# Patient Record
Sex: MALE | Race: WHITE | NOT HISPANIC OR LATINO | Employment: OTHER | URBAN - METROPOLITAN AREA
[De-identification: names, ages, dates, MRNs, and addresses within clinical notes are randomized per-mention and may not be internally consistent; named-entity substitution may affect disease eponyms.]

---

## 2017-01-06 ENCOUNTER — OFFICE VISIT (OUTPATIENT)
Dept: UROLOGY | Facility: CLINIC | Age: 82
End: 2017-01-06
Payer: COMMERCIAL

## 2017-01-06 VITALS
SYSTOLIC BLOOD PRESSURE: 126 MMHG | WEIGHT: 196 LBS | BODY MASS INDEX: 25.15 KG/M2 | HEIGHT: 74 IN | DIASTOLIC BLOOD PRESSURE: 70 MMHG

## 2017-01-06 DIAGNOSIS — C61 PROSTATE CANCER (H): ICD-10-CM

## 2017-01-06 DIAGNOSIS — R10.2 PELVIC PAIN IN MALE: ICD-10-CM

## 2017-01-06 LAB
ALBUMIN UR-MCNC: NEGATIVE MG/DL
APPEARANCE UR: CLEAR
BILIRUB UR QL STRIP: NEGATIVE
COLOR UR AUTO: YELLOW
GLUCOSE UR STRIP-MCNC: NEGATIVE MG/DL
HGB UR QL STRIP: NEGATIVE
KETONES UR STRIP-MCNC: NEGATIVE MG/DL
LEUKOCYTE ESTERASE UR QL STRIP: NEGATIVE
NITRATE UR QL: NEGATIVE
PH UR STRIP: 6.5 PH (ref 5–7)
PSA SERPL-MCNC: 1.2 NG/ML (ref 0–4)
SP GR UR STRIP: 1.02 (ref 1–1.03)
URN SPEC COLLECT METH UR: NORMAL
UROBILINOGEN UR STRIP-ACNC: 0.2 EU/DL (ref 0.2–1)

## 2017-01-06 PROCEDURE — 36415 COLL VENOUS BLD VENIPUNCTURE: CPT | Performed by: UROLOGY

## 2017-01-06 PROCEDURE — 81003 URINALYSIS AUTO W/O SCOPE: CPT | Performed by: UROLOGY

## 2017-01-06 PROCEDURE — 99214 OFFICE O/P EST MOD 30 MIN: CPT | Performed by: UROLOGY

## 2017-01-06 PROCEDURE — 84153 ASSAY OF PSA TOTAL: CPT | Performed by: UROLOGY

## 2017-01-06 ASSESSMENT — PAIN SCALES - GENERAL: PAINLEVEL: MILD PAIN (2)

## 2017-01-06 NOTE — MR AVS SNAPSHOT
After Visit Summary   1/6/2017    Jorge Salomon    MRN: [id number]           Patient Information     Date Of Birth          3/31/1933        Visit Information        Provider Department      1/6/2017 11:10 AM Marek Miles MD [facility] [place]        Today's Diagnoses     Hypertrophy (benign) of prostate    -  1     Prostate cancer (H)         Pelvic pain in male           Care Instructions    Please call [phone] to schedule your Prostate MRI at the [facility].          Follow-ups after your visit        Your next 10 appointments already scheduled     Jan 12, 2017  3:20 PM   SHORT with Srinivasa Tello MD   [facility] ([facility])    [address]  [facility] [address] [phone]              Who to contact     If you have questions or need follow up information about today's clinic visit or your schedule please contact [facility] [place] directly at [phone].  Normal or non-critical lab and imaging results will be communicated to you by [employer], letter or phone within 4 business days after the clinic has received the results. If you do not hear from us within 7 days, please contact the clinic through [employer] or phone. If you have a critical or abnormal lab result, we will notify you by phone as soon as possible.  Submit refill requests through [employer] or call your pharmacy and they will forward the refill request to us. Please allow 3 business days for your refill to be completed.          Additional Information About Your Visit        MyChart Information     [employer] gives you secure access to your electronic health record. If you see a primary care provider, you can also send messages to your care team and make appointments. If you have questions, please call your primary care clinic.  If you do not have a primary care provider, please call [phone] and they will  "assist you.        Care EveryWhere ID     This is your Care EveryWhere ID. This could be used by other organizations to access your [place] medical records  [id number]        Your Vitals Were     Height BMI (Body Mass Index)                1.867 m (6' 1.5\") 25.51 kg/m2           Blood Pressure from Last 3 Encounters:   01/06/17 126/70   12/24/16 124/64   12/15/16 110/70    Weight from Last 3 Encounters:   01/06/17 88.905 kg (196 lb)   12/24/16 91.8 kg (202 lb 6.1 oz)   12/15/16 92.08 kg (203 lb)              We Performed the Following     PSA Diag Urologic Phys     UA without Microscopic          Today's Medication Changes          These changes are accurate as of: 1/6/17 11:50 AM.  If you have any questions, ask your nurse or doctor.               These medicines have changed or have updated prescriptions.        Dose/Directions    simvastatin 10 MG tablet   Commonly known as:  ZOCOR   This may have changed:  how much to take   Used for:  Hyperlipidemia LDL goal <100        Dose:  10 mg   Take 1 tablet (10 mg) by mouth At Bedtime   Quantity:  90 tablet   Refills:  1                Primary Care Provider Office Phone # Fax #    Srinivasa Tello [phone]       [facility] [address] [place] [address]        Thank you!     Thank you for choosing [facility] [place]  for your care. Our goal is always to provide you with excellent care. Hearing back from our patients is one way we can continue to improve our services. Please take a few minutes to complete the written survey that you may receive in the mail after your visit with us. Thank you!             Your Updated Medication List - Protect others around you: Learn how to safely use, store and throw away your medicines at www.disposemymeds.org.          This list is accurate as of: 1/6/17 11:50 AM.  Always use your most recent med list.                   Brand Name Dispense Instructions " for use    aspirin 81 MG tablet      1 TABLET DAILY       clotrimazole-betamethasone cream    LOTRISONE    30 g    Apply topically 2 times daily       dutasteride-tamsulosin HCl 0.5-0.4 MG Caps    NANCY    90 capsule    Take  One capsule by mouth every day       econazole nitrate 1 % cream     30 g    Apply to affected area twice daily for four weeks.       fish oil-omega-3 fatty acids 1000 MG capsule      Take 2 g by mouth daily.       GLUCOSAMINE PO      Take by mouth 2 times daily       hydrocortisone 1 % cream    CORTAID    30 g    Apply sparingly to affected area twice daily as needed.       Lycopene 10 MG Caps      Take 10 mg by mouth daily       order for DME     2 Units    Equipment being ordered: Knee high stockings, 20-30 mm Hg pressure. One pair, refill prn.       simvastatin 10 MG tablet    ZOCOR    90 tablet    Take 1 tablet (10 mg) by mouth At Bedtime       VITAMIN D PO      Take  by mouth.

## 2017-01-06 NOTE — Clinical Note
1/6/2017       RE: Jorge Salomon  [address] [facility] [address]     Dear Colleague,    Thank you for referring your patient, Jorge Salomon, to the [facility] [place] [facility]. Please see a copy of my visit note below.    Jorge Salomon is an 83-year-old gentleman with pelvic discomfort history of prostatitis but with a negative post-ejaculatory urine culture.  He was in the ER 2 weeks ago with a normal urinalysis and a CT scan of the abdomen and pelvis that showed no pelvic abnormalities and a 3.2 cm infrarenal aortic aneurysm. 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  Rebiopsies revealed no cancer.  The PSAs have been 1.0, 1.12 and 1.20. His rectal pain has improved with using Preparation H.  The patient's pain level is 2/10 today where as it was 5/10 in the emergency room.  He denies any dysuria or hematuria.  Past medical history:hypercholesterolemia, irritable bowel, colonic polyps, internal hemorrhoids, dry eyes, flushing, retinal issues, mitral valve prolapse,nonsmoker  Family history: Diabetes, COPD, heart disease, prostate cancer  Medications:baby aspirin, vitamin D, Lotrisone cream, Marie, E, zolpidem cream, fish oil, glucosamine, Cortaid cream, lycopene, simvastatin  Allergies: None  Exam: Normal appearance, normal vital signs, alert and oriented, normocephalic, normal respirations, neuro grossly intact.   Small left testis, normal right testis.  Scrotum and phallus normal.  Normal meatus, normal spermatic cords.  Groins without hernias or adenopathy.  No abdominal mass or pain on palpation.  Normal sphincter tone, no rectal mass or impaction, prostate is firm at the left apex and slightly asymmetric at the right apex.  Seminal vesicles are normal.   He is slightly tender at the left lobe of the  prostate.  Assessment: Anxiety-he admits to this and has never been treated                         Low-grade adenocarcinoma of the prostate                         Infrarenal aortic aneurysm                          Pelvic discomfort-he says he cannot live like this.  Could possibly be due to hemorrhoids, nonbacterial prostatitis  Plan: Repeat 3 T MRI of the prostate            PSA every 6 months            Regular ejaculations             See Dr. Tello next week.  Patient would probably benefit from an anxiolytic medication             Follow up with Fahad Brown M.D. For his aortic aneurysm-patient used to see Marlon Valle M.D.    Again, thank you for allowing me to participate in the care of your patient.      Sincerely,    Marek Miles MD

## 2017-01-06 NOTE — PROGRESS NOTES
Jorge Salomon is an 83-year-old gentleman with pelvic discomfort history of prostatitis but with a negative post-ejaculatory urine culture.  He was in the ER 2 weeks ago with a normal urinalysis and a CT scan of the abdomen and pelvis that showed no pelvic abnormalities and a 3.2 cm infrarenal aortic aneurysm. 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  Rebiopsies revealed no cancer.  The PSAs have been 1.0, 1.12 and 1.20. His rectal pain has improved with using Preparation H.  The patient's pain level is 2/10 today where as it was 5/10 in the emergency room.  He denies any dysuria or hematuria.  Past medical history:hypercholesterolemia, irritable bowel, colonic polyps, internal hemorrhoids, dry eyes, flushing, retinal issues, mitral valve prolapse,nonsmoker  Family history: Diabetes, COPD, heart disease, prostate cancer  Medications:baby aspirin, vitamin D, Lotrisone cream, Marie, E, zolpidem cream, fish oil, glucosamine, Cortaid cream, lycopene, simvastatin  Allergies: None  Exam: Normal appearance, normal vital signs, alert and oriented, normocephalic, normal respirations, neuro grossly intact.   Small left testis, normal right testis.  Scrotum and phallus normal.  Normal meatus, normal spermatic cords.  Groins without hernias or adenopathy.  No abdominal mass or pain on palpation.  Normal sphincter tone, no rectal mass or impaction, prostate is firm at the left apex and slightly asymmetric at the right apex.  Seminal vesicles are normal.   He is slightly tender at the left lobe of the prostate.  Assessment: Anxiety-he admits to this and has never been treated                         Low-grade adenocarcinoma of the prostate                         Infrarenal aortic aneurysm                          Pelvic discomfort-he says he cannot live like this.  Could possibly be due to hemorrhoids,  nonbacterial prostatitis  Plan: Repeat 3 T MRI of the prostate            PSA every 6 months            Regular ejaculations             See Dr. Tello next week.  Patient would probably benefit from an anxiolytic medication             Follow up with Fahad Brown M.D. For his aortic aneurysm-patient used to see Marlon Valle M.D.

## 2017-01-12 ENCOUNTER — OFFICE VISIT (OUTPATIENT)
Dept: INTERNAL MEDICINE | Facility: CLINIC | Age: 82
End: 2017-01-12
Payer: COMMERCIAL

## 2017-01-12 VITALS
BODY MASS INDEX: 25.93 KG/M2 | TEMPERATURE: 97.6 F | OXYGEN SATURATION: 96 % | RESPIRATION RATE: 12 BRPM | WEIGHT: 202 LBS | HEART RATE: 96 BPM | DIASTOLIC BLOOD PRESSURE: 64 MMHG | SYSTOLIC BLOOD PRESSURE: 126 MMHG | HEIGHT: 74 IN

## 2017-01-12 DIAGNOSIS — K64.9 HEMORRHOIDS, UNSPECIFIED HEMORRHOID TYPE: ICD-10-CM

## 2017-01-12 DIAGNOSIS — R10.2 PELVIC PAIN IN MALE: Primary | ICD-10-CM

## 2017-01-12 PROCEDURE — 99214 OFFICE O/P EST MOD 30 MIN: CPT | Performed by: INTERNAL MEDICINE

## 2017-01-12 NOTE — PROGRESS NOTES
"  SUBJECTIVE:                                                    Jorge Salomon is a 83 year old male who presents to clinic today for the following health issues:    1/12/17 --  Jorge reported that he was given Cipro on 12/15 to treat what was assumed to be prostatitis, but the pelvic/rectal pain was persistent and he reported to the ER on 12/24. He stated that he saw Dr. Miles of Urology on 1/06/17, who told him he did not have prostatitis. Dr Miles has scheduled a 3T MRI on 2/2/17. CT of the abdomen and pelvis obtained in the ED on 12/24 showed his AAA diameter at 3.2 cm, and his pancreas was normal.     He stated he still experiences some deep abdominal pains, though less severe.     He stated when symptoms started, he felt like his \"face was flushed\" and he felt sunburnt. He stated he has had hemorrhoids in the past. He stated he has been stressed by caring for his wife since her colostomy surgery on 10/26/16.     He stated that he can \"get very anxious about things\" and this can cause his \"guts to feel weird\".     ED/UC Followup:    Facility:  [facility] ED  Date of visit: 12/24/2016  Reason for visit: pelvic pain  Current Status: stable, improved discomfort with preporation H       ED Note 12/24/16 --   Jorge Salomon is a 83 year old male currently on Cipro for possible prostatitis despite negative UA and urine culture, who presents with deep pelvic pain. The patient reports that 2 weeks ago he developed rectal pain which he thought may be related to another bout of prostatitis as the pain felt similar to the past 3-4 bouts. He went to see his urologist at which point urinalysis and urine culture returned negative. The patient went back a few days later for a blood draw and CBC returned with a normal white blood cell count. Six days ago, the patient was placed on oral Cipro. Within the last few days, the patient's rectal pain has began radiating to the pelvic area. The pelvic pain is " "intermittent in nature and is very bothersome. He presents to the ED today with 5/10 pain. The patient notes that with prior bouts of prostatitis his symptoms usually improve within 24 hours of antibiotics, so he does not think his pain is related to that anymore. The patient denies appetite change, fever, nausea, vomiting, diarrhea, constipation, or dysuria.      Jorge Salomon is a 83 year old male with a history of prostatitis who presents for evaluation of deep pelvic discomfort in the context of possible prostatitis. He is on his 6th day of Cipro but negative UA and urine culture. Rectal exam is unremarkable for evidence of prostate or external hemorrhoid. CT was performed to rule out possible pelvic abscess, diverticulitis, or other process and is negative. He does note that he had a history of hemorrhoids in the past and preparation H helps this discomfort. I recommended he continue this and follow up with primary care in 2-3 days. I believe he is safe for discharge at this time with plan for primary care follow up in 2-3 days and strict return precautions for worse pain, fever, vomiting, or any other concerns.       Problem list and histories reviewed & adjusted, as indicated.  Additional history: as documented    Problem list, Medication list, Allergies, and Medical/Social/Surgical histories reviewed in [facility] and updated as appropriate.    ROS:  REVIEW OF SYSTEMS: The following systems have been completely reviewed and are negative except as noted above:   Constitutional, gastrointestinal, genitourinary, psychiatric systems.       OBJECTIVE:                                                    /64 mmHg  Pulse 96  Temp(Src) 97.6  F (36.4  C) (Oral)  Resp 12  Ht 1.867 m (6' 1.5\")  Wt 91.627 kg (202 lb)  BMI 26.29 kg/m2  SpO2 96%  Body mass index is 26.29 kg/(m^2).  GENERAL: healthy, alert and no distress  ABDOMEN: soft, nontender, no hepatosplenomegaly, no masses and bowel sounds normal  NEURO: " Normal strength and tone, mentation intact and speech normal  PSYCH: mentation appears normal, affect normal/bright    Diagnostic Test Results:  Results for orders placed or performed in visit on 01/06/17   UA without Microscopic   Result Value Ref Range    Color Urine Yellow     Appearance Urine Clear     Glucose Urine Negative NEG mg/dL    Bilirubin Urine Negative NEG    Ketones Urine Negative NEG mg/dL    Specific Gravity Urine 1.025 1.003 - 1.035    Blood Urine Negative NEG    pH Urine 6.5 5.0 - 7.0 pH    Protein Albumin Urine Negative NEG mg/dL    Urobilinogen Urine 0.2 0.2 - 1.0 EU/dL    Nitrite Urine Negative NEG    Leukocyte Esterase Urine Negative NEG    Source Midstream Urine    PSA Diag Urologic Phys   Result Value Ref Range    PSA Diag Urologic Phys 1.20 0.00 - 4.00 ng/mL        ASSESSMENT/PLAN:                                                      (R10.2) Pelvic pain in male  (primary encounter diagnosis)  Comment: improved, still recurrent. Improves subjectively with Preparation H. No obvious pathology on recent CARLOS or CT of abdomen/pelvis.   Concern is for GI origin, possibly rectal source. Offered Colorectal surgery consult.   Plan: COLORECTAL SURGERY REFERRAL        Follow up for referral, follow up with PCP in 6/12 months    (K64.9) Hemorrhoids, unspecified hemorrhoid type  Plan: COLORECTAL SURGERY REFERRAL      Patient Instructions   Things look fine now.     If the pains recur/persist, would next see the Colorectal Surgery specialists to see what ideas they might have.     See me back in 6-12 months.       This document serves as a record of the services and decisions personally performed and made by Srinivasa Tello MD. It was created on their behalf by Tyrell Fair, a trained medical scribe. The creation of this document is based the provider's statements to the medical scribe.  Tyrell Fair January 12, 2017 3:47 PM       Srinivasa Tello MD  [facility]

## 2017-01-12 NOTE — NURSING NOTE
"Chief Complaint   Patient presents with     ER F/U       Initial /64 mmHg  Pulse 96  Temp(Src) 97.6  F (36.4  C) (Oral)  Resp 12  Ht 6' 1.5\" (1.867 m)  Wt 202 lb (91.627 kg)  BMI 26.29 kg/m2  SpO2 96% Estimated body mass index is 26.29 kg/(m^2) as calculated from the following:    Height as of this encounter: 6' 1.5\" (1.867 m).    Weight as of this encounter: 202 lb (91.627 kg).  BP completed using cuff size: large  JBuffie CMA      "

## 2017-01-12 NOTE — PATIENT INSTRUCTIONS
Things look fine now.     If the pains recur/persist, would next see the Colorectal Surgery specialists to see what ideas they might have.     See me back in 6-12 months.

## 2017-01-12 NOTE — MR AVS SNAPSHOT
After Visit Summary   1/12/2017    Jorge Salomon    MRN: [id number]           Patient Information     Date Of Birth          3/31/1933        Visit Information        Provider Department      1/12/2017 3:20 PM Srinivasa Tello MD [facility]        Today's Diagnoses     Pelvic pain in male    -  1     Hemorrhoids, unspecified hemorrhoid type           Care Instructions    Things look fine now.     If the pains recur/persist, would next see the Colorectal Surgery specialists to see what ideas they might have.     See me back in 6-12 months.         Follow-ups after your visit        Additional Services     COLORECTAL SURGERY REFERRAL       Your provider has referred you to: N: [facility] [place] [phone]   [email or web address]  [place] [phone]   [email or web address]    Referral Reason(s): Hemorrhoids and pelvic pain  Special Concerns: None  This referral is: Elective (week +)  It is OK to leave a message on patient's voicemail.    Please be aware that coverage of these services is subject to the terms and limitations of your health insurance plan.  Call member services at your health plan with any benefit or coverage questions.      Please bring the following with you to your appointment:    (1) Any X-Rays, CTs or MRIs which have been performed.  Contact the facility where they were done to arrange for  prior to your scheduled appointment.    (2) List of current medications  (3) This referral request   (4) Any documents/labs given to you for this referral                  Your next 10 appointments already scheduled     Feb 02, 2017 11:30 AM   (Arrive by 11:15 AM)   MR PROSTATE with [id number]   [facility] ([facility])    [address] [address] [phone]           Take your medicines as usual, unless your doctor tells you not to. Bring a list  of your current medicines to your exam (including vitamins, minerals and over-the-counter drugs). Also bring the results of similar scans you may have had.  Please remove any body piercings and hair extensions before you arrive.  Follow your doctor s orders. If you do not, we may have to postpone your exam.  You will not have contrast for this exam. You do not need to do anything special to prepare.  The MRI machine uses a strong magnet. Please wear clothes without metal (snaps, zippers). A sweatsuit works well, or we may give you a hospital gown.   **IMPORTANT** THE INSTRUCTIONS BELOW ARE ONLY FOR THOSE PATIENTS WHO HAVE BEEN TOLD THEY WILL RECEIVE SEDATION OR GENERAL ANESTHESIA DURING THEIR MRI PROCEDURE:  IF YOU WILL RECEIVE SEDATION (take medicine to help you relax during your exam):   You must get the medicine from your doctor before you arrive. Bring the medicine to the exam. Do not take it at home.   Arrive one hour early. Bring someone who can take you home after the test. Your medicine will make you sleepy. After the exam, you may not drive, take a bus or take a taxi by yourself.   No eating 8 hours before your exam. You may have clear liquids up until 4 hours before your exam. (Clear liquids include water, clear tea, black coffee and fruit juice without pulp.)  IF YOU WILL RECEIVE ANESTHESIA (be asleep for your exam):   Arrive 1 1/2 hours early. Bring someone who can take you home after the test. You may not drive, take a bus or take a taxi by yourself.   No eating 8 hours before your exam. You may have clear liquids up until 4 hours before your exam. (Clear liquids include water, clear tea, black coffee and fruit juice without pulp.)   You will spend four to five hours in the recovery room.  Please call the Imaging Department at your exam site with any questions.            Jul 11, 2017  9:00 AM   Return Visit with Marek Miles MD   [facility] [place] ([facility]rolog[facility]  "[facility])    [address] [facility]  [address]  [facility] [address] [phone]              Who to contact     If you have questions or need follow up information about today's clinic visit or your schedule please contact [facility] directly at [phone].  Normal or non-critical lab and imaging results will be communicated to you by MyChart, letter or phone within 4 business days after the clinic has received the results. If you do not hear from us within 7 days, please contact the clinic through [employer] or phone. If you have a critical or abnormal lab result, we will notify you by phone as soon as possible.  Submit refill requests through [email or web address] or call your pharmacy and they will forward the refill request to us. Please allow 3 business days for your refill to be completed.          Additional Information About Your Visit        MyChart Information     [email or web address] gives you secure access to your electronic health record. If you see a primary care provider, you can also send messages to your care team and make appointments. If you have questions, please call your primary care clinic.  If you do not have a primary care provider, please call [phone] and they will assist you.        Care EveryWhere ID     This is your Care EveryWhere ID. This could be used by other organizations to access your [place] medical records  [id number]        Your Vitals Were     Pulse Temperature Respirations Height BMI (Body Mass Index) Pulse Oximetry    96 97.6  F (36.4  C) (Oral) 12 6' 1.5\" (1.867 m) 26.29 kg/m2 96%       Blood Pressure from Last 3 Encounters:   01/12/17 126/64   01/06/17 126/70   12/24/16 124/64    Weight from Last 3 Encounters:   01/12/17 202 lb (91.627 kg)   01/06/17 196 lb (88.905 kg)   12/24/16 202 lb 6.1 oz (91.8 kg)              We Performed the Following     COLORECTAL SURGERY REFERRAL        Primary Care Provider Office Phone # Fax #    Srinivasa Tello [phone] " [phone]       [facility] [address]  [facility] [address]        Thank you!     Thank you for choosing [facility]  for your care. Our goal is always to provide you with excellent care. Hearing back from our patients is one way we can continue to improve our services. Please take a few minutes to complete the written survey that you may receive in the mail after your visit with us. Thank you!             Your Updated Medication List - Protect others around you: Learn how to safely use, store and throw away your medicines at www.disposemymeds.org.          This list is accurate as of: 1/12/17  3:57 PM.  Always use your most recent med list.                   Brand Name Dispense Instructions for use    aspirin 81 MG tablet      1 TABLET DAILY       clotrimazole-betamethasone cream    LOTRISONE    30 g    Apply topically 2 times daily       dutasteride-tamsulosin HCl 0.5-0.4 MG Caps    NANCY    90 capsule    Take  One capsule by mouth every day       fish oil-omega-3 fatty acids 1000 MG capsule      Take 2 g by mouth daily.       GLUCOSAMINE PO      Take by mouth 2 times daily       hydrocortisone 1 % cream    CORTAID    30 g    Apply sparingly to affected area twice daily as needed.       Lycopene 10 MG Caps      Take 10 mg by mouth daily       order for DME     2 Units    Equipment being ordered: Knee high stockings, 20-30 mm Hg pressure. One pair, refill prn.       VITAMIN D PO      Take  by mouth.

## 2017-02-15 ENCOUNTER — OFFICE VISIT (OUTPATIENT)
Dept: UROLOGY | Facility: CLINIC | Age: 82
End: 2017-02-15
Payer: COMMERCIAL

## 2017-02-15 VITALS
BODY MASS INDEX: 26.77 KG/M2 | WEIGHT: 202 LBS | DIASTOLIC BLOOD PRESSURE: 68 MMHG | SYSTOLIC BLOOD PRESSURE: 120 MMHG | HEIGHT: 73 IN | HEART RATE: 88 BPM

## 2017-02-15 DIAGNOSIS — C61 MALIGNANT NEOPLASM OF PROSTATE (H): Primary | ICD-10-CM

## 2017-02-15 PROCEDURE — 99213 OFFICE O/P EST LOW 20 MIN: CPT | Performed by: UROLOGY

## 2017-02-15 ASSESSMENT — PAIN SCALES - GENERAL: PAINLEVEL: NO PAIN (0)

## 2017-02-15 NOTE — LETTER
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 Repeat biopsies were negative. His PSA has been stable at 1.0. However, recent rectal exam revealed some induration at the left apex. 3 [facility] MRI of the prostate shows some growth of tumor in this area with the suspicion of extracapsular spread. There is no pelvic adenopathy.  Past medical history: Colon polyps, dry eyes, abdominal hernia, hemorrhoids, irritable bowel syndrome, retinal pathology, mitral valve prolapse, mumps, hypercholesterolemia, nonsmoker  Family history: Prostate cancer, diabetes, heart disease  Medications: Baby aspirin, vitamin D, panda, fish oil, glucosamine, hydrocortisone cream, lycopene  Allergies: None  Review of systems: No difficulty voiding, dysuria or hematuria  Exam: Normal appearance, normal vital signs, alert and oriented, normocephalic, normal respirations, neuro grossly intact. 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 Discussed options for treatment including continued observation and intermittent hormonal therapy-patient does not want side effects of ADT.  Patient understands a grade 3 adenocarcinoma the prostate is unlikely to ever metastasize. Recommend he consider IMR T for cure  Plan: Discuss with Annetta Hunt D.O.            Patient would not require hormone therapy    Marek Miles MD

## 2017-02-15 NOTE — PROGRESS NOTES
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 Repeat biopsies were negative. His PSA has been stable at 1.0. However, recent rectal exam revealed some induration at the left apex. 3 [facility] MRI of the prostate shows some growth of tumor in this area with the suspicion of extracapsular spread. There is no pelvic adenopathy.  Past medical history: Colon polyps, dry eyes, abdominal hernia, hemorrhoids, irritable bowel syndrome, retinal pathology, mitral valve prolapse, mumps, hypercholesterolemia, nonsmoker  Family history: Prostate cancer, diabetes, heart disease  Medications: Baby aspirin, vitamin D, panda, fish oil, glucosamine, hydrocortisone cream, lycopene  Allergies: None  Review of systems: No difficulty voiding, dysuria or hematuria  Exam: Normal appearance, normal vital signs, alert and oriented, normocephalic, normal respirations, neuro grossly intact. 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 Discussed options for treatment including continued observation and intermittent hormonal therapy-patient does not want side effects of ADT.  Patient understands a grade 3 adenocarcinoma the prostate is unlikely to ever metastasize. Recommend he consider IMR T for cure  Plan: Discuss with Annetta Hunt D.O.            Patient would not require hormone therapy

## 2017-02-15 NOTE — MR AVS SNAPSHOT
After Visit Summary   2/15/2017    Jorge Salomon    MRN: [id number]           Patient Information     Date Of Birth          3/31/1933        Visit Information        Provider Department      2/15/2017 2:00 PM Marek Miles MD [facility] [place]        Today's Diagnoses     Malignant neoplasm of prostate (H)    -  1       Follow-ups after your visit        Your next 10 appointments already scheduled     Jul 11, 2017  9:00 AM CDT   Return Visit with Marek Miles MD   [facility] [place] ([facility])    [address]  [address]  [facility] [address] [phone]              Who to contact     If you have questions or need follow up information about today's clinic visit or your schedule please contact [facility] directly at [phone].  Normal or non-critical lab and imaging results will be communicated to you by MyChart, letter or phone within 4 business days after the clinic has received the results. If you do not hear from us within 7 days, please contact the clinic through Zura!hart or phone. If you have a critical or abnormal lab result, we will notify you by phone as soon as possible.  Submit refill requests through Roam & Wander or call your pharmacy and they will forward the refill request to us. Please allow 3 business days for your refill to be completed.          Additional Information About Your Visit        MyChart Information     [employer] gives you secure access to your electronic health record. If you see a primary care provider, you can also send messages to your care team and make appointments. If you have questions, please call your primary care clinic.  If you do not have a primary care provider, please call [phone] and they will assist you.        Care EveryWhere ID     This is your Care EveryWhere ID. This could be used by other  "organizations to access your [facility] medical records  [id number]        Your Vitals Were     Pulse Height BMI (Body Mass Index)             88 1.854 m (6' 1\") 26.65 kg/m2          Blood Pressure from Last 3 Encounters:   02/15/17 120/68   01/12/17 126/64   01/06/17 126/70    Weight from Last 3 Encounters:   02/15/17 91.6 kg (202 lb)   01/12/17 91.6 kg (202 lb)   01/06/17 88.9 kg (196 lb)              Today, you had the following     No orders found for display       Primary Care Provider Office Phone # Fax #    Srinivasa Tello [phone]       [facility] [address] [place] [address]        Thank you!     Thank you for choosing [facility]  for your care. Our goal is always to provide you with excellent care. Hearing back from our patients is one way we can continue to improve our services. Please take a few minutes to complete the written survey that you may receive in the mail after your visit with us. Thank you!             Your Updated Medication List - Protect others around you: Learn how to safely use, store and throw away your medicines at www.disposemymeds.org.          This list is accurate as of: 2/15/17  2:20 PM.  Always use your most recent med list.                   Brand Name Dispense Instructions for use    aspirin 81 MG tablet      1 TABLET DAILY       dutasteride-tamsulosin HCl 0.5-0.4 MG Caps    NANCY    90 capsule    Take  One capsule by mouth every day       fish oil-omega-3 fatty acids 1000 MG capsule      Take 2 g by mouth daily.       GLUCOSAMINE PO      Take by mouth 2 times daily       hydrocortisone 1 % cream    CORTAID    30 g    Apply sparingly to affected area twice daily as needed.       Lycopene 10 MG Caps      Take 10 mg by mouth daily       VITAMIN D PO      Take  by mouth.         "

## 2017-02-15 NOTE — LETTER
2/15/2017       RE: Jorge Salomon  [address] [facility] [address]     Dear Colleague,    Thank you for referring your patient, Jorge Salomon, to the [facility]OLOG[facility] CLINIC [place] at [facility]. Please see a copy of my visit note below.    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 Repeat biopsies were negative. His PSA has been stable at 1.0. However, recent rectal exam revealed some induration at the left apex. 3 [facility] MRI of the prostate shows some growth of tumor in this area with the suspicion of extracapsular spread. There is no pelvic adenopathy.  Past medical history: Colon polyps, dry eyes, abdominal hernia, hemorrhoids, irritable bowel syndrome, retinal pathology, mitral valve prolapse, mumps, hypercholesterolemia, nonsmoker  Family history: Prostate cancer, diabetes, heart disease  Medications: Baby aspirin, vitamin D, panda, fish oil, glucosamine, hydrocortisone cream, lycopene  Allergies: None  Review of systems: No difficulty voiding, dysuria or hematuria  Exam: Normal appearance, normal vital signs, alert and oriented, normocephalic, normal respirations, neuro grossly intact. 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 Discussed options for treatment including continued observation and intermittent hormonal therapy-patient does not want side effects of ADT.  Patient understands a grade 3 adenocarcinoma the prostate is unlikely to ever metastasize. Recommend he consider IMR T for cure  Plan: Discuss with Annetta Hunt D.O.            Patient would not require hormone therapy    Again, thank you for allowing me to participate in the care of your patient.    Sincerely,    Marek Miles MD

## 2017-02-20 ENCOUNTER — TELEPHONE (OUTPATIENT)
Dept: UROLOGY | Facility: CLINIC | Age: 82
End: 2017-02-20

## 2017-02-20 NOTE — TELEPHONE ENCOUNTER
Pt would like to know how much risk he will be in if he postpones starting his radiation until October. He would like you to call him.

## 2017-03-02 ENCOUNTER — TRANSFERRED RECORDS (OUTPATIENT)
Dept: HEALTH INFORMATION MANAGEMENT | Facility: CLINIC | Age: 82
End: 2017-03-02

## 2017-04-05 ENCOUNTER — TELEPHONE (OUTPATIENT)
Dept: UROLOGY | Facility: CLINIC | Age: 82
End: 2017-04-05

## 2017-04-05 NOTE — TELEPHONE ENCOUNTER
Pt has finished radiation #15 and is now having trouble urinating. Pt is taking double the amount of Panda and it has worked very well. However, in doing this he is also doubling the amount of avodart as well. Do you want him an Rx for just one daily flomax that he can take along with his panda? How do you wish to proceed ...

## 2017-05-31 ENCOUNTER — TELEPHONE (OUTPATIENT)
Dept: UROLOGY | Facility: CLINIC | Age: 82
End: 2017-05-31

## 2017-05-31 DIAGNOSIS — N40.0 BPH (BENIGN PROSTATIC HYPERPLASIA): ICD-10-CM

## 2017-05-31 NOTE — TELEPHONE ENCOUNTER
Received a phone call from [facility] requesting a RX refill on patient's generic Marie. However, patient is requesting a change in medication to twice daily. MD is currently out of office today and LM for patient. Will wait for a return phone call and to okay with MD before authorizing. Carol Aiken LPN

## 2017-06-01 RX ORDER — DUTASTERIDE AND TAMSULOSIN HYDROCHLORIDE CAPSULES .5; .4 MG/1; MG/1
CAPSULE ORAL
Qty: 30 CAPSULE | Refills: 0 | Status: SHIPPED | OUTPATIENT
Start: 2017-06-01 | End: 2017-06-06

## 2017-06-06 ENCOUNTER — TELEPHONE (OUTPATIENT)
Dept: UROLOGY | Facility: CLINIC | Age: 82
End: 2017-06-06

## 2017-06-06 RX ORDER — DUTASTERIDE AND TAMSULOSIN HYDROCHLORIDE CAPSULES .5; .4 MG/1; MG/1
CAPSULE ORAL
Qty: 60 CAPSULE | Refills: 3 | Status: SHIPPED | OUTPATIENT
Start: 2017-06-06 | End: 2018-07-27 | Stop reason: ALTCHOICE

## 2017-06-06 NOTE — TELEPHONE ENCOUNTER
Received a reply from MD that patient's Marie was okayed to take BID. Sent RX to patient's preferred pharmacy and patient will follow-up in 2 months post radiation as planned. Carol Aiken LPN

## 2017-08-01 DIAGNOSIS — C61 MALIGNANT NEOPLASM OF PROSTATE (H): ICD-10-CM

## 2017-08-01 PROCEDURE — G0103 PSA SCREENING: HCPCS | Mod: GA | Performed by: INTERNAL MEDICINE

## 2017-08-02 LAB — PSA SERPL-ACNC: 0.66 UG/L (ref 0–4)

## 2017-08-08 ENCOUNTER — OFFICE VISIT (OUTPATIENT)
Dept: UROLOGY | Facility: CLINIC | Age: 82
End: 2017-08-08
Payer: COMMERCIAL

## 2017-08-08 VITALS — WEIGHT: 196 LBS | OXYGEN SATURATION: 98 % | HEART RATE: 76 BPM | BODY MASS INDEX: 26.55 KG/M2 | HEIGHT: 72 IN

## 2017-08-08 DIAGNOSIS — C61 MALIGNANT NEOPLASM OF PROSTATE (H): Primary | ICD-10-CM

## 2017-08-08 PROCEDURE — 99212 OFFICE O/P EST SF 10 MIN: CPT | Performed by: UROLOGY

## 2017-08-08 ASSESSMENT — PAIN SCALES - GENERAL: PAINLEVEL: NO PAIN (0)

## 2017-08-08 NOTE — LETTER
8/8/2017       RE: Jorge Salomon  [address] [facility] [address]     Dear Colleague,    Thank you for referring your patient, Jorge Salomon, to the [facility] CLINIC [place] at [facility]. Please see a copy of my visit note below.    Jorge Salomon is an 84-year-old gentleman with adenocarcinoma the prostate. He completed external radiation earlier this year for a grade 3, T2a cancer at the left apex. His PSA is now down to 0.66  Other past medical history: Colonic polyps, abdominal hernia, hemorrhoids, dry eyes, IBS, retinal pathology, mitral valve prolapse, high cholesterol, nonsmoker  Medications: Low-dose aspirin, vitamin D, Marie, glucosamine, fish oil, hydrocortisone cream, lycopene  Allergies: None  Review of systems: Voiding well, no dysuria or hematuria, no bowel issues currently  Exam: Normal appearance, normal vital signs, alert and oriented, normocephalic, normal respirations, neuro grossly intact. Normal sphincter tone, no rectal mass or impaction, benign feeling prostate, normal seminal vesicles  Assessment: Grade 3 adenocarcinoma the prostate, stage TIIa-patient has had good response to radiation therapy  Plan: See me again in 6 months with PSA    Again, thank you for allowing me to participate in the care of your patient.      Sincerely,  Marek Miles MD

## 2017-08-08 NOTE — NURSING NOTE
My note says this appt is FU for MRI.  Pt states that is incorrect.  Pt states he just finished radiation and is here to FU on that.  Pt denies any voiding trouble.  ROSIE Joyce, CMA

## 2017-08-08 NOTE — MR AVS SNAPSHOT
After Visit Summary   8/8/2017    Jorge Salomon    MRN: [id number]           Patient Information     Date Of Birth          3/31/1933        Visit Information        Provider Department      8/8/2017 10:20 AM Marek Miles MD [facility] [place]        Today's Diagnoses     Malignant neoplasm of prostate (H)    -  1       Follow-ups after your visit        Follow-up notes from your care team     Return in about 6 months (around 2/8/2018) for PSA.      Future tests that were ordered for you today     Open Future Orders        Priority Expected Expires Ordered    PSA tumor marker [[id number]] Routine 2/8/2018 8/8/2018 8/8/2017            Who to contact     If you have questions or need follow up information about today's clinic visit or your schedule please contact [facility] UR[facility] directly at [phone].  Normal or non-critical lab and imaging results will be communicated to you by MyChart, letter or phone within 4 business days after the clinic has received the results. If you do not hear from us within 7 days, please contact the clinic through SnipSnaphart or phone. If you have a critical or abnormal lab result, we will notify you by phone as soon as possible.  Submit refill requests through Generate or call your pharmacy and they will forward the refill request to us. Please allow 3 business days for your refill to be completed.          Additional Information About Your Visit        MyChart Information     Generate gives you secure access to your electronic health record. If you see a primary care provider, you can also send messages to your care team and make appointments. If you have questions, please call your primary care clinic.  If you do not have a primary care provider, please call [phone] and they will assist you.        Care EveryWhere ID     This is your Care EveryWhere ID. This could be used by other  organizations to access your [facility] medical records  [id number]        Your Vitals Were     Pulse Height Pulse Oximetry BMI (Body Mass Index)          76 1.829 m (6') 98% 26.58 kg/m2         Blood Pressure from Last 3 Encounters:   02/15/17 120/68   01/12/17 126/64   01/06/17 126/70    Weight from Last 3 Encounters:   08/08/17 88.9 kg (196 lb)   02/15/17 91.6 kg (202 lb)   01/12/17 91.6 kg (202 lb)               Primary Care Provider Office Phone # Fax #    Srinivasa Tello [phone]       [facility] [address]  [facility] [address]        Equal Access to Services     TRIPP BUTLER : Hadii aad ku hadasho Soomaali, waaxda luqadaha, qaybta kaalmada adeegyada, waxsaleem idiin haykeyla marc. So [facility] [phone].    ATENCIÓN: Si habla español, tiene a ojeda disposición servicios gratuitos de asistencia lingüística. Llame al [phone].    We comply with applicable federal civil rights laws and [place] laws. We do not discriminate on the basis of race, color, national origin, age, disability sex, sexual orientation or gender identity.            Thank you!     Thank you for choosing [facility] CLINIC [place]  for your care. Our goal is always to provide you with excellent care. Hearing back from our patients is one way we can continue to improve our services. Please take a few minutes to complete the written survey that you may receive in the mail after your visit with us. Thank you!             Your Updated Medication List - Protect others around you: Learn how to safely use, store and throw away your medicines at www.disposemymeds.org.          This list is accurate as of: 8/8/17 10:53 AM.  Always use your most recent med list.                   Brand Name Dispense Instructions for use Diagnosis    aspirin 81 MG tablet      1 TABLET DAILY        dutasteride-tamsulosin HCl 0.5-0.4 MG Caps    NANCY    60 capsule    Take  One capsule  by mouth every day    BPH (benign prostatic hyperplasia)       fish oil-omega-3 fatty acids 1000 MG capsule      Take 2 g by mouth daily.        GLUCOSAMINE PO      Take by mouth 2 times daily        hydrocortisone 1 % cream    CORTAID    30 g    Apply sparingly to affected area twice daily as needed.    Tinea cruris       Lycopene 10 MG Caps      Take 10 mg by mouth daily        VITAMIN D PO      Take  by mouth.

## 2018-01-30 DIAGNOSIS — C61 MALIGNANT NEOPLASM OF PROSTATE (H): ICD-10-CM

## 2018-01-30 PROCEDURE — 84153 ASSAY OF PSA TOTAL: CPT | Performed by: UROLOGY

## 2018-01-30 PROCEDURE — 36415 COLL VENOUS BLD VENIPUNCTURE: CPT | Performed by: UROLOGY

## 2018-01-31 LAB — PSA SERPL-MCNC: 0.17 UG/L (ref 0–4)

## 2018-02-06 ENCOUNTER — OFFICE VISIT (OUTPATIENT)
Dept: UROLOGY | Facility: CLINIC | Age: 83
End: 2018-02-06
Payer: COMMERCIAL

## 2018-02-06 VITALS
DIASTOLIC BLOOD PRESSURE: 60 MMHG | SYSTOLIC BLOOD PRESSURE: 128 MMHG | BODY MASS INDEX: 25.67 KG/M2 | WEIGHT: 200 LBS | HEIGHT: 74 IN | HEART RATE: 72 BPM

## 2018-02-06 DIAGNOSIS — C61 MALIGNANT NEOPLASM OF PROSTATE (H): Primary | ICD-10-CM

## 2018-02-06 PROCEDURE — 99213 OFFICE O/P EST LOW 20 MIN: CPT | Performed by: UROLOGY

## 2018-02-06 ASSESSMENT — PAIN SCALES - GENERAL: PAINLEVEL: NO PAIN (0)

## 2018-02-06 NOTE — PROGRESS NOTES
Jorge Salomon is an 84-year-old gentleman with grade 3+3 adenocarcinoma the prostate at the left apex. He had stage TIIa disease. PSA is down to 0.17 after radiation therapy  He is having no difficulty voiding and will discontinue Flomax in the near future but stay on Avodart. Other medications include low-dose aspirin, vitamin D, glucosamine, fish oil, lycopene  Allergies: None  Review of systems: No dysuria or hematuria  Exam: Normal appearance, normal vital signs, alert and oriented, normocephalic, normal respirations, neuro grossly intact. Normal sphincter tone, no rectal mass or impaction, benign feeling prostate, normal seminal vesicles  Assessment: Adenocarcinoma the prostate  Plan: See me in 6 months with PSA and for digital rectal exam

## 2018-02-06 NOTE — LETTER
2/6/2018       RE: Jorge Salomon  [address]  [facility] [address]     Dear Colleague,    Thank you for referring your patient, Jorge Salomon, to the [facility] CLINIC [place] at [facility]. Please see a copy of my visit note below.    Jorge Salomon is an 84-year-old gentleman with grade 3+3 adenocarcinoma the prostate at the left apex. He had stage TIIa disease. PSA is down to 0.17 after radiation therapy  He is having no difficulty voiding and will discontinue Flomax in the near future but stay on Avodart. Other medications include low-dose aspirin, vitamin D, glucosamine, fish oil, lycopene  Allergies: None  Review of systems: No dysuria or hematuria  Exam: Normal appearance, normal vital signs, alert and oriented, normocephalic, normal respirations, neuro grossly intact. Normal sphincter tone, no rectal mass or impaction, benign feeling prostate, normal seminal vesicles  Assessment: Adenocarcinoma the prostate  Plan: See me in 6 months with PSA and for digital rectal exam    Again, thank you for allowing me to participate in the care of your patient.      Sincerely,    Marek Miles MD

## 2018-02-06 NOTE — MR AVS SNAPSHOT
After Visit Summary   2/6/2018    Jorge Salomon    MRN: [id number]           Patient Information     Date Of Birth          3/31/1933        Visit Information        Provider Department      2/6/2018 10:50 AM Marek Miles MD [facility]        Today's Diagnoses     Malignant neoplasm of prostate (H)    -  1       Follow-ups after your visit        Follow-up notes from your care team     Return in about 6 months (around 8/6/2018) for PSA.      Your next 10 appointments already scheduled     Aug 07, 2018 10:30 AM CDT   Return Visit with Marek Miles MD   [facility] ([facility])    [address]  [address]  [facility] [address] [phone]              Future tests that were ordered for you today     Open Future Orders        Priority Expected Expires Ordered    PSA tumor marker [[id number]] Routine 8/6/2018 2/6/2019 2/6/2018            Who to contact     If you have questions or need follow up information about today's clinic visit or your schedule please contact [facility] directly at [phone].  Normal or non-critical lab and imaging results will be communicated to you by MyChart, letter or phone within 4 business days after the clinic has received the results. If you do not hear from us within 7 days, please contact the clinic through [employer] H2hart or phone. If you have a critical or abnormal lab result, we will notify you by phone as soon as possible.  Submit refill requests through NuPathe or call your pharmacy and they will forward the refill request to us. Please allow 3 business days for your refill to be completed.          Additional Information About Your Visit        MyChart Information     NuPathe gives you secure access to your electronic health record. If you see a primary care provider, you can also send messages  "to your care team and make appointments. If you have questions, please call your primary care clinic.  If you do not have a primary care provider, please call [phone] and they will assist you.        Care EveryWhere ID     This is your Care EveryWhere ID. This could be used by other organizations to access your [facility] medical records  [id number]        Your Vitals Were     Pulse Height BMI (Body Mass Index)             72 1.867 m (6' 1.5\") 26.03 kg/m2          Blood Pressure from Last 3 Encounters:   02/06/18 128/60   02/15/17 120/68   01/12/17 126/64    Weight from Last 3 Encounters:   02/06/18 90.7 kg (200 lb)   08/08/17 88.9 kg (196 lb)   02/15/17 91.6 kg (202 lb)               Primary Care Provider Office Phone # Fax #    Srinivasa Tello [phone]       [address] [facility] [address]  [place] [address]        Equal Access to Services     [facility]: Hadii aad ku hadasho Soomaali, waaxda luqadaha, qaybta kaalmada adeegyada, waxay idiin hayaan mecca kharash leonel . So [facility] [phone].    ATENCIÓN: Si habla español, tiene a ojeda disposición servicios gratuitos de asistencia lingüística. Llame al [phone].    We comply with applicable federal civil rights laws and [place] laws. We do not discriminate on the basis of race, color, national origin, age, disability, sex, sexual orientation, or gender identity.            Thank you!     Thank you for choosing [facility]OLOGY CLINIC [place]  for your care. Our goal is always to provide you with excellent care. Hearing back from our patients is one way we can continue to improve our services. Please take a few minutes to complete the written survey that you may receive in the mail after your visit with us. Thank you!             Your Updated Medication List - Protect others around you: Learn how to safely use, store and throw away your medicines at www.disposemymeds.org.          This list is accurate as of 2/6/18 " 11:28 AM.  Always use your most recent med list.                   Brand Name Dispense Instructions for use Diagnosis    aspirin 81 MG tablet      1 TABLET DAILY        dutasteride-tamsulosin HCl 0.5-0.4 MG Caps    NANCY    60 capsule    Take  One capsule by mouth every day    BPH (benign prostatic hyperplasia)       fish oil-omega-3 fatty acids 1000 MG capsule      Take 2 g by mouth daily.        GLUCOSAMINE PO      Take by mouth 2 times daily        hydrocortisone 1 % cream    CORTAID    30 g    Apply sparingly to affected area twice daily as needed.    Tinea cruris       Lycopene 10 MG Caps      Take 10 mg by mouth daily        VITAMIN D PO      Take  by mouth.

## 2018-03-22 ENCOUNTER — OFFICE VISIT (OUTPATIENT)
Dept: INTERNAL MEDICINE | Facility: CLINIC | Age: 83
End: 2018-03-22
Payer: COMMERCIAL

## 2018-03-22 VITALS
TEMPERATURE: 98.3 F | HEIGHT: 74 IN | HEART RATE: 82 BPM | DIASTOLIC BLOOD PRESSURE: 68 MMHG | WEIGHT: 205.8 LBS | SYSTOLIC BLOOD PRESSURE: 110 MMHG | OXYGEN SATURATION: 95 % | BODY MASS INDEX: 26.41 KG/M2

## 2018-03-22 DIAGNOSIS — C61 PROSTATE CANCER (H): Primary | ICD-10-CM

## 2018-03-22 DIAGNOSIS — L72.3 SEBACEOUS CYST: ICD-10-CM

## 2018-03-22 DIAGNOSIS — M25.50 PAIN IN JOINT, MULTIPLE SITES: ICD-10-CM

## 2018-03-22 LAB
CRP SERPL-MCNC: 17 MG/L (ref 0–8)
DIFFERENTIAL METHOD BLD: ABNORMAL
EOSINOPHIL # BLD AUTO: 0 10E9/L (ref 0–0.7)
EOSINOPHIL NFR BLD AUTO: 1 %
ERYTHROCYTE [DISTWIDTH] IN BLOOD BY AUTOMATED COUNT: 12.9 % (ref 10–15)
ERYTHROCYTE [SEDIMENTATION RATE] IN BLOOD BY WESTERGREN METHOD: 21 MM/H (ref 0–20)
HCT VFR BLD AUTO: 38.5 % (ref 40–53)
HGB BLD-MCNC: 13 G/DL (ref 13.3–17.7)
LYMPHOCYTES # BLD AUTO: 0.8 10E9/L (ref 0.8–5.3)
LYMPHOCYTES NFR BLD AUTO: 21 %
MCH RBC QN AUTO: 34.3 PG (ref 26.5–33)
MCHC RBC AUTO-ENTMCNC: 33.8 G/DL (ref 31.5–36.5)
MCV RBC AUTO: 102 FL (ref 78–100)
MONOCYTES # BLD AUTO: 0.4 10E9/L (ref 0–1.3)
MONOCYTES NFR BLD AUTO: 11 %
NEUTROPHILS # BLD AUTO: 2.5 10E9/L (ref 1.6–8.3)
NEUTROPHILS NFR BLD AUTO: 67 %
PLATELET # BLD AUTO: 160 10E9/L (ref 150–450)
PLATELET # BLD EST: ABNORMAL 10*3/UL
RBC # BLD AUTO: 3.79 10E12/L (ref 4.4–5.9)
RBC MORPH BLD: ABNORMAL
WBC # BLD AUTO: 3.7 10E9/L (ref 4–11)

## 2018-03-22 PROCEDURE — 36415 COLL VENOUS BLD VENIPUNCTURE: CPT | Performed by: FAMILY MEDICINE

## 2018-03-22 PROCEDURE — 99214 OFFICE O/P EST MOD 30 MIN: CPT | Performed by: FAMILY MEDICINE

## 2018-03-22 PROCEDURE — 86431 RHEUMATOID FACTOR QUANT: CPT | Performed by: FAMILY MEDICINE

## 2018-03-22 PROCEDURE — 85025 COMPLETE CBC W/AUTO DIFF WBC: CPT | Performed by: FAMILY MEDICINE

## 2018-03-22 PROCEDURE — 85652 RBC SED RATE AUTOMATED: CPT | Performed by: FAMILY MEDICINE

## 2018-03-22 PROCEDURE — 86140 C-REACTIVE PROTEIN: CPT | Performed by: FAMILY MEDICINE

## 2018-03-22 PROCEDURE — 84550 ASSAY OF BLOOD/URIC ACID: CPT | Performed by: FAMILY MEDICINE

## 2018-03-22 PROCEDURE — 80053 COMPREHEN METABOLIC PANEL: CPT | Performed by: FAMILY MEDICINE

## 2018-03-22 NOTE — PROGRESS NOTES
"CHIEF COMPLAINT    Check cyst  Possible arthritis Hand and wrist pain.  Labs.      HISTORY    He has a tender lump on back.     He has been having more pain in hands. Location is L wrist and R index PIPJ. More pain in AM. Pain with gripping.     He also wishes to have labs checked. He has been receiving Dutaseride / Tamsulosin for low grade prostate CA.    Patient Active Problem List   Diagnosis     Irritable bowel syndrome     Benign prostatic hyperplasia     Benign neoplasm of colon     Macular puckering of retina     Prostate cancer (H)     HYPERLIPIDEMIA LDL GOAL <100     Pelvic pain in male     Current Outpatient Prescriptions   Medication Sig Dispense Refill     dutasteride-tamsulosin HCl (NANCY) 0.5-0.4 MG CAPS Take  One capsule by mouth every day 60 capsule 3     Cholecalciferol (VITAMIN D PO) Take  by mouth.       GLUCOSAMINE PO Take by mouth 2 times daily        fish oil-omega-3 fatty acids (FISH OIL) 1000 MG capsule Take 2 g by mouth daily.       ASPIRIN 81 MG OR TABS 1 TABLET DAILY       hydrocortisone (CORTAID) 1 % cream Apply sparingly to affected area twice daily as needed. 30 g 1       REVIEW OF SYSTEMS    No sob  No CP  No abd pain  No focal weakness      Past Medical History:   Diagnosis Date     Benign neoplasm of colon 6/98, 3/05    Hyperplastic polyp on both procedures.     Dry eyes      Hernia, abdominal      Hypertrophy (benign) of prostate     hx of episodic prostatits     Internal hemorrhoids      Irritable bowel syndrome     IBS     Macular puckering of retina      Mitral valve prolapse      Mumps      Other disorders of vitreous     Vitreo-macular traction syndrome, left eye     Pure hypercholesterolemia        EXAM  /68 (Cuff Size: Adult Large)  Pulse 82  Temp 98.3  F (36.8  C) (Oral)  Ht 6' 1.5\" (1.867 m)  Wt 205 lb 12.8 oz (93.4 kg)  SpO2 95%  BMI 26.78 kg/m2    L upper back:  3- 4 cm cyst with redness, tenderness      R hand poss slight swelling, no redness  L wrist has " decr ROM vs R wrist. L wrist extension about 45 degree.    Neck: neg  Resp: non labored  CV: RSR  Legs: no carlos,a      (C61) Prostate cancer (H)  (primary encounter diagnosis)  Comment:   See Urology.  We were asked to assess.  Plan: Comprehensive metabolic panel (BMP + Alb, Alk         Phos, ALT, AST, Total. Bili, TP), CBC with         platelets and differential            (M25.50) Pain in joint, multiple sites  Comment:   R/O rheumatologic component prior to seeking treatment.  Plan: ESR: Erythrocyte sedimentation rate, CRP,         inflammation, Rheumatoid factor, Uric acid            (L72.3) Sebaceous cyst  Comment:   Plan: He has a derm apt tomorrow which should be fine

## 2018-03-22 NOTE — NURSING NOTE
"Chief Complaint   Patient presents with     Arthritis     Derm Problem     on back       Initial /68 (Cuff Size: Adult Large)  Pulse 82  Temp 98.3  F (36.8  C) (Oral)  Ht 6' 1.5\" (1.867 m)  Wt 205 lb 12.8 oz (93.4 kg)  SpO2 95%  BMI 26.78 kg/m2 Estimated body mass index is 26.78 kg/(m^2) as calculated from the following:    Height as of this encounter: 6' 1.5\" (1.867 m).    Weight as of this encounter: 205 lb 12.8 oz (93.4 kg).  Medication Reconciliation: complete    "

## 2018-03-22 NOTE — MR AVS SNAPSHOT
After Visit Summary   3/22/2018    Jorge Salomon    MRN: [id number]           Patient Information     Date Of Birth          3/31/1933        Visit Information        Provider Department      3/22/2018 1:00 PM Santos Medina MD [facility]        Today's Diagnoses     Prostate cancer (H)    -  1    Pain in joint, multiple sites        Sebaceous cyst           Follow-ups after your visit        Your next 10 appointments already scheduled     Aug 07, 2018 10:30 AM CDT   Return Visit with Marek Miles MD   [facility] ([facility])    [address]  [address]  [facility] [address] [phone]              Who to contact     If you have questions or need follow up information about today's clinic visit or your schedule please contact [facility] directly at [phone].  Normal or non-critical lab and imaging results will be communicated to you by MyChart, letter or phone within 4 business days after the clinic has received the results. If you do not hear from us within 7 days, please contact the clinic through [employer] or phone. If you have a critical or abnormal lab result, we will notify you by phone as soon as possible.  Submit refill requests through [employer] or call your pharmacy and they will forward the refill request to us. Please allow 3 business days for your refill to be completed.          Additional Information About Your Visit        MyChart Information     [employer] gives you secure access to your electronic health record. If you see a primary care provider, you can also send messages to your care team and make appointments. If you have questions, please call your primary care clinic.  If you do not have a primary care provider, please call [phone] and they will assist you.        Care EveryWhere ID     This is your Care EveryWhere ID. This could be used by other  "organizations to access your [place] medical records  [id number]        Your Vitals Were     Pulse Temperature Height Pulse Oximetry BMI (Body Mass Index)       82 98.3  F (36.8  C) (Oral) 6' 1.5\" (1.867 m) 95% 26.78 kg/m2        Blood Pressure from Last 3 Encounters:   03/22/18 110/68   02/06/18 128/60   02/15/17 120/68    Weight from Last 3 Encounters:   03/22/18 205 lb 12.8 oz (93.4 kg)   02/06/18 200 lb (90.7 kg)   08/08/17 196 lb (88.9 kg)              We Performed the Following     CBC with platelets and differential     Comprehensive metabolic panel (BMP + Alb, Alk Phos, ALT, AST, Total. Bili, TP)     CRP, inflammation     ESR: Erythrocyte sedimentation rate     Rheumatoid factor        Primary Care Provider Office Phone # Fax #    Srinivasa Tello [phone]       [address] [place] [address]        Equal Access to Services     [facility] : Hadii aad ku hadasho Soomaali, waaxda luqadaha, qaybta kaalmada adeegyada, waxay idiin hayjaylenen mecca castaneda . So [facility] [phone].    ATENCIÓN: Si habla español, tiene a ojeda disposición servicios gratuitos de asistencia lingüística. Llame al [phone].    We comply with applicable federal civil rights laws and [place] laws. We do not discriminate on the basis of race, color, national origin, age, disability, sex, sexual orientation, or gender identity.            Thank you!     Thank you for choosing [facility]  for your care. Our goal is always to provide you with excellent care. Hearing back from our patients is one way we can continue to improve our services. Please take a few minutes to complete the written survey that you may receive in the mail after your visit with us. Thank you!             Your Updated Medication List - Protect others around you: Learn how to safely use, store and throw away your medicines at www.disposemymeds.org.          This list is accurate as of 3/22/18  1:33 PM.  Always " use your most recent med list.                   Brand Name Dispense Instructions for use Diagnosis    aspirin 81 MG tablet      1 TABLET DAILY        dutasteride-tamsulosin HCl 0.5-0.4 MG Caps    NANCY    60 capsule    Take  One capsule by mouth every day    BPH (benign prostatic hyperplasia)       fish oil-omega-3 fatty acids 1000 MG capsule      Take 2 g by mouth daily.        GLUCOSAMINE PO      Take by mouth 2 times daily        hydrocortisone 1 % cream    CORTAID    30 g    Apply sparingly to affected area twice daily as needed.    Tinea cruris       VITAMIN D PO      Take  by mouth.

## 2018-03-23 LAB
ALBUMIN SERPL-MCNC: 3.5 G/DL (ref 3.4–5)
ALP SERPL-CCNC: 51 U/L (ref 40–150)
ALT SERPL W P-5'-P-CCNC: 22 U/L (ref 0–70)
ANION GAP SERPL CALCULATED.3IONS-SCNC: 4 MMOL/L (ref 3–14)
AST SERPL W P-5'-P-CCNC: 19 U/L (ref 0–45)
BILIRUB SERPL-MCNC: 0.3 MG/DL (ref 0.2–1.3)
BUN SERPL-MCNC: 13 MG/DL (ref 7–30)
CALCIUM SERPL-MCNC: 8.7 MG/DL (ref 8.5–10.1)
CHLORIDE SERPL-SCNC: 105 MMOL/L (ref 94–109)
CO2 SERPL-SCNC: 30 MMOL/L (ref 20–32)
CREAT SERPL-MCNC: 0.97 MG/DL (ref 0.66–1.25)
GFR SERPL CREATININE-BSD FRML MDRD: 74 ML/MIN/1.7M2
GLUCOSE SERPL-MCNC: 119 MG/DL (ref 70–99)
POTASSIUM SERPL-SCNC: 4 MMOL/L (ref 3.4–5.3)
PROT SERPL-MCNC: 7.2 G/DL (ref 6.8–8.8)
RHEUMATOID FACT SER NEPH-ACNC: <20 IU/ML (ref 0–20)
SODIUM SERPL-SCNC: 139 MMOL/L (ref 133–144)
URATE SERPL-MCNC: 4.6 MG/DL (ref 3.5–7.2)

## 2018-03-25 ENCOUNTER — MYC MEDICAL ADVICE (OUTPATIENT)
Dept: INTERNAL MEDICINE | Facility: CLINIC | Age: 83
End: 2018-03-25

## 2018-03-25 DIAGNOSIS — M25.50 MULTIPLE JOINT PAIN: Primary | ICD-10-CM

## 2018-03-25 DIAGNOSIS — D64.9 LOW HEMOGLOBIN: ICD-10-CM

## 2018-04-03 NOTE — TELEPHONE ENCOUNTER
Message sent through [employer] with recommendations from Dr. Fulton with Rheumatology referral information.    Asked pt to send response if he would like to have b12 level checked.

## 2018-04-03 NOTE — TELEPHONE ENCOUNTER
Patient sent a My Chart message to Dr. Medina 3/25 was not aware that Dr. Medina was no longer coming to this clinic.  Asking if Dr. Tello can review 3/22 labs ordered by Dr. Medina and send him a My Chart message with interpretation and recommendations.  He is concerned that there are so many abnormals.    Patient would like referral to rheumatology to figure out why most of his joints are painful, worse over past month.  ALEIDA Camarillo R.N.

## 2018-04-03 NOTE — TELEPHONE ENCOUNTER
Inflammatory markers are elevated.    Rheum referral given    Possible B12 deficiency given slightly low hemoglobin and elevated MCV.     Could do lab only B12

## 2018-04-06 DIAGNOSIS — D64.9 LOW HEMOGLOBIN: ICD-10-CM

## 2018-04-06 LAB — VIT B12 SERPL-MCNC: 297 PG/ML (ref 193–986)

## 2018-04-06 PROCEDURE — 36415 COLL VENOUS BLD VENIPUNCTURE: CPT | Performed by: INTERNAL MEDICINE

## 2018-04-06 PROCEDURE — 82607 VITAMIN B-12: CPT | Performed by: INTERNAL MEDICINE

## 2018-04-09 ENCOUNTER — TELEPHONE (OUTPATIENT)
Dept: INTERNAL MEDICINE | Facility: CLINIC | Age: 83
End: 2018-04-09

## 2018-04-09 NOTE — TELEPHONE ENCOUNTER
Pt calls requesting an appointment with Dr. Tello to review labs drawn by Dr. Medina on 3/22/18. Pt did send [employer] message last week and Dr. Fulton referred him to Rheumatology and ordered recommended B12 lab. Pt had B12 level drawn last week and has Rheumatology appt 5/15/18. Pt very concerned about elevated inflammatory marker labs asks if Dr. Tello can see him this week.

## 2018-04-10 NOTE — TELEPHONE ENCOUNTER
Left message on home machine advising him to call us back, and for him to leave message with the triage RN that he is returning Dr Tello's call.   Will also leave patient a MyChart message.

## 2018-04-10 NOTE — TELEPHONE ENCOUNTER
Spoke with patient. He notes some relief with OTC naproxen 220 mg.   Suggested that he could try taking up to 440 mg twice daily with food.   Advised him to try calling rheumatology twice a week to see if they can keep him in mind in case of any cancellations.

## 2018-04-18 ENCOUNTER — TRANSFERRED RECORDS (OUTPATIENT)
Dept: HEALTH INFORMATION MANAGEMENT | Facility: CLINIC | Age: 83
End: 2018-04-18

## 2018-04-19 ENCOUNTER — OFFICE VISIT (OUTPATIENT)
Dept: FAMILY MEDICINE | Facility: CLINIC | Age: 83
End: 2018-04-19
Payer: COMMERCIAL

## 2018-04-19 VITALS
WEIGHT: 208 LBS | BODY MASS INDEX: 28.17 KG/M2 | RESPIRATION RATE: 16 BRPM | HEART RATE: 78 BPM | SYSTOLIC BLOOD PRESSURE: 104 MMHG | DIASTOLIC BLOOD PRESSURE: 60 MMHG | TEMPERATURE: 97.5 F | HEIGHT: 72 IN | OXYGEN SATURATION: 95 %

## 2018-04-19 DIAGNOSIS — C61 PROSTATE CANCER (H): ICD-10-CM

## 2018-04-19 DIAGNOSIS — M35.00 SJOGREN'S SYNDROME, WITH UNSPECIFIED ORGAN INVOLVEMENT (H): ICD-10-CM

## 2018-04-19 DIAGNOSIS — Z01.818 PREOP GENERAL PHYSICAL EXAM: Primary | ICD-10-CM

## 2018-04-19 DIAGNOSIS — G56.01 CARPAL TUNNEL SYNDROME OF RIGHT WRIST: ICD-10-CM

## 2018-04-19 PROCEDURE — 93000 ELECTROCARDIOGRAM COMPLETE: CPT | Performed by: FAMILY MEDICINE

## 2018-04-19 PROCEDURE — 99215 OFFICE O/P EST HI 40 MIN: CPT | Performed by: FAMILY MEDICINE

## 2018-04-19 RX ORDER — NAPROXEN SODIUM 220 MG
220 TABLET ORAL 2 TIMES DAILY WITH MEALS
Qty: 60 TABLET | COMMUNITY
Start: 2018-04-19 | End: 2018-06-29

## 2018-04-19 NOTE — PROGRESS NOTES
[facility]  [address] [place] [id number] [phone]  Dept: [phone]    PRE-OP EVALUATION:  Today's date: 2018    Jorge Salomon (: 3/31/1933) presents for pre-operative evaluation assessment as requested by Dr. Romano.  He requires evaluation and anesthesia risk assessment prior to undergoing surgery/procedure for treatment of R wrist .    Fax number for surgical facility: [phone]  Primary Physician: Srinivasa Tello  Type of Anesthesia Anticipated: Local    Patient has a Health Care Directive or Living Will:  YES     Preop Questions 2018   Who is doing your surgery? ava   What are you having done? carpel tunnel   Date of Surgery/Procedure:    Facility or Hospital where procedure/surgery will be performed: [facility]   1.  Do you have a history of Heart attack, stroke, stent, coronary bypass surgery, or other heart surgery? No   2.  Do you ever have any pain or discomfort in your chest? No   3.  Do you have a history of  Heart Failure? No   4.   Are you troubled by shortness of breath when:  walking on a level surface, or up a slight hill, or at night? No   5.  Do you currently have a cold, bronchitis or other respiratory infection? No   6.  Do you have a cough, shortness of breath, or wheezing? No   7.  Do you sometimes get pains in the calves of your legs when you walk? No   8. Do you or anyone in your family have previous history of blood clots? No   9.  Do you or does anyone in your family have a serious bleeding problem such as prolonged bleeding following surgeries or cuts? No   10. Have you ever had problems with anemia or been told to take iron pills? No   11. Have you had any abnormal blood loss such as black, tarry or bloody stools? No   12. Have you ever had a blood transfusion? No   13. Have you or any of your relatives ever had problems with anesthesia? No   14. Do you have sleep apnea, excessive  snoring or daytime drowsiness? No   15. Do you have any prosthetic heart valves? No   16. Do you have prosthetic joints? No       HPI:     HPI related to upcoming procedure: Carpal tunnel syndrome in the right wrist for 1 month.  Possible RA diagnosis as well, but this has not been confirmed.      See problem list for active medical problems.  Problems all longstanding and stable, except as noted/documented.  See ROS for pertinent symptoms related to these conditions.                                                                                                                                                          .    MEDICAL HISTORY:     Patient Active Problem List    Diagnosis Date Noted     Sjogren's syndrome (H) 04/19/2018     Priority: Medium     Patient reports his ophthalmologist diagnosed him with this 30-40 years ago        Carpal tunnel syndrome of right wrist 04/19/2018     Priority: Medium     Pelvic pain in male 05/31/2013     Priority: Medium     HYPERLIPIDEMIA LDL GOAL <100 10/31/2010     Priority: Medium     Prostate cancer (H) 06/19/2009     Priority: Medium     Macular puckering of retina      Priority: Medium     Irritable bowel syndrome 01/16/2003     Priority: Medium     Benign prostatic hyperplasia 01/16/2003     Priority: Medium     Problem list name updated by automated process. Provider to review and confirm       Benign neoplasm of colon 01/16/2003     Priority: Medium      Past Medical History:   Diagnosis Date     Benign neoplasm of colon 6/98, 3/05    Hyperplastic polyp on both procedures.     Dry eyes      Hernia, abdominal      Hypertrophy (benign) of prostate     hx of episodic prostatits     Internal hemorrhoids      Irritable bowel syndrome     IBS     Macular puckering of retina      Mitral valve prolapse      Mumps      Other disorders of vitreous     Vitreo-macular traction syndrome, left eye     Pure hypercholesterolemia      Past Surgical History:   Procedure Laterality  "Date     COLONOSCOPY  6/98    one \"1/4 inch hyperplastic\" polyp.     COLONOSCOPY  3/2005    One hyperplastic polyp     CYSTOSCOPY       Full thickness macular hole, left eye  02/9/20008     HERNIORRHAPHY INGUINAL  7/9/2014    Procedure: HERNIORRHAPHY INGUINAL;  Surgeon: Reji Engle MD;  Location: [facility] OR     Left eye cataract  01/01/2000     Left vitreoretinal surgery  2/8/08     REMOVAL OF SPERM DUCT(S)  1970     Right eye Cataract  11/2012     VASECTOMY       Current Outpatient Prescriptions   Medication Sig Dispense Refill     ASPIRIN 81 MG OR TABS 1 TABLET DAILY       Cholecalciferol (VITAMIN D PO) Take  by mouth.       dutasteride-tamsulosin HCl (NANCY) 0.5-0.4 MG CAPS Take  One capsule by mouth every day 60 capsule 3     fish oil-omega-3 fatty acids (FISH OIL) 1000 MG capsule Take 2 g by mouth daily.       GLUCOSAMINE PO Take by mouth 2 times daily        hydrocortisone (CORTAID) 1 % cream Apply sparingly to affected area twice daily as needed. 30 g 1     naproxen sodium (ANAPROX) 220 MG tablet Take 1 tablet (220 mg) by mouth 2 times daily (with meals) 60 tablet      OTC products: None, except as noted above    Allergies   Allergen Reactions     No Known Allergies       Latex Allergy: NO    Social History   Substance Use Topics     Smoking status: Never Smoker     Smokeless tobacco: Never Used     Alcohol use No     History   Drug Use No       REVIEW OF SYSTEMS:   CONSTITUTIONAL: NEGATIVE for fever, chills, change in weight  INTEGUMENTARY/SKIN: NEGATIVE for worrisome rashes, moles or lesions  EYES: NEGATIVE for vision changes or irritation  ENT/MOUTH: NEGATIVE for ear, mouth and throat problems  RESP: NEGATIVE for significant cough or SOB  BREAST: NEGATIVE for masses, tenderness or discharge  CV: NEGATIVE for chest pain, palpitations or peripheral edema  GI: NEGATIVE for nausea, abdominal pain, heartburn, or change in bowel habits  : NEGATIVE for frequency, dysuria, or " hematuria  MUSCULOSKELETAL:POSITIVE  for arthralgias   NEURO: NEGATIVE for weakness, dizziness or paresthesias  ENDOCRINE: NEGATIVE for temperature intolerance, skin/hair changes  HEME: NEGATIVE for bleeding problems  PSYCHIATRIC: NEGATIVE for changes in mood or affect    EXAM:   /60 (BP Location: Right arm, Patient Position: Chair, Cuff Size: Adult Regular)  Pulse 78  Temp 97.5  F (36.4  C) (Oral)  Resp 16  Ht 6' (1.829 m)  Wt 208 lb (94.3 kg)  SpO2 95%  BMI 28.21 kg/m2    GENERAL APPEARANCE: healthy, alert and no distress     EYES: EOMI,  PERRL     HENT: ear canals and TM's normal and nose and mouth without ulcers or lesions     NECK: no adenopathy, no asymmetry, masses, or scars and thyroid normal to palpation     RESP: lungs clear to auscultation - no rales, rhonchi or wheezes     CV: regular rates and rhythm, normal S1 S2, no S3 or S4 and no murmur, click or rub     ABDOMEN:  soft, nontender, no HSM or masses and bowel sounds normal     MS: extremities normal- no gross deformities noted, no evidence of inflammation in joints, FROM in all extremities.     SKIN: no suspicious lesions or rashes     NEURO: Normal strength and tone, sensory exam grossly normal, mentation intact and speech normal     PSYCH: mentation appears normal. and affect normal/bright     LYMPHATICS: No cervical adenopathy    DIAGNOSTICS:     EKG: appears normal, NSR, normal axis, normal intervals, no acute ST/T changes c/w ischemia, Right Bundle Branch Block, unchanged from previous tracings    Recent Labs   Lab Test  03/22/18   1344  12/24/16   0221   HGB  13.0*  13.8   PLT  160  167   NA  139  142   POTASSIUM  4.0  4.2   CR  0.97  1.13        IMPRESSION:   Reason for surgery/procedure: Carpal tunnel release   Diagnosis/reason for consult: Pre-op risk assessment     The proposed surgical procedure is considered INTERMEDIATE risk.    REVISED CARDIAC RISK INDEX  The patient has the following serious cardiovascular risks for  perioperative complications such as (MI, PE, VFib and 3  AV Block):  No serious cardiac risks  INTERPRETATION: 0 risks: Class I (very low risk - 0.4% complication rate)    The patient has the following additional risks for perioperative complications:  No identified additional risks      ICD-10-CM    1. Preop general physical exam Z01.818 EKG 12-lead complete w/read - Clinics     CANCELED: CBC with platelets differential     CANCELED: Comprehensive metabolic panel   2. Carpal tunnel syndrome of right wrist G56.01    3. Prostate cancer (H) C61    4. Sjogren's syndrome, with unspecified organ involvement (H) M35.00        RECOMMENDATIONS:     --Patient is to take all scheduled medications on the day of surgery EXCEPT for modifications listed below.    Anticoagulant or Antiplatelet Medication Use  ASPIRIN: Discontinue ASA 7-10 days prior to procedure to reduce bleeding risk.  It should be resumed post-operatively.  NSAIDS: Naproxen (Naprosyn):   Stop 2-3 days prior to surgery (Please note that pt has continued to take OTC Naproxen BID prior to the surgery since he was not aware he should stop it)        APPROVAL GIVEN to proceed with proposed procedure, without further diagnostic evaluation       Signed Electronically by: Shannan Pires DO    Copy of this evaluation report is provided to requesting physician.    [place] Preop Guidelines    Revised Cardiac Risk Index

## 2018-04-19 NOTE — MR AVS SNAPSHOT
After Visit Summary   4/19/2018    Jorge Salomon    MRN: [id number]           Patient Information     Date Of Birth          3/31/1933        Visit Information        Provider Department      4/19/2018 9:40 AM Shannan Pires DO [facility]        Today's Diagnoses     Preop general physical exam    -  1    Carpal tunnel syndrome of right wrist        Prostate cancer (H)        Sjogren's syndrome, with unspecified organ involvement (H)          Care Instructions      Before Your Surgery      Call your surgeon if there is any change in your health. This includes signs of a cold or flu (such as a sore throat, runny nose, cough, rash or fever).    Do not smoke, drink alcohol or take over the counter medicine (unless your surgeon or primary care doctor tells you to) for the 24 hours before and after surgery.    If you take prescribed drugs: Follow your doctor s orders about which medicines to take and which to stop until after surgery.    Eating and drinking prior to surgery: follow the instructions from your surgeon    Take a shower or bath the night before surgery. Use the soap your surgeon gave you to gently clean your skin. If you do not have soap from your surgeon, use your regular soap. Do not shave or scrub the surgery site.  Wear clean pajamas and have clean sheets on your bed.           Follow-ups after your visit        Your next 10 appointments already scheduled     May 15, 2018 12:00 PM CDT   New Visit with Kan Nelson MD   [facility]    [address] [id number] [phone]            Aug 07, 2018 10:30 AM CDT   Return Visit with Marek Miles MD   [facility] ([facility])    [facility]  [address]  [facility] [address] [phone]              Who to contact     If you have questions  or need follow up information about today's clinic visit or your schedule please contact [facility] directly at [phone].  Normal or non-critical lab and imaging results will be communicated to you by MyChart, letter or phone within 4 business days after the clinic has received the results. If you do not hear from us within 7 days, please contact the clinic through [employer] or phone. If you have a critical or abnormal lab result, we will notify you by phone as soon as possible.  Submit refill requests through [employer] or call your pharmacy and they will forward the refill request to us. Please allow 3 business days for your refill to be completed.          Additional Information About Your Visit        TalkApolisharVirtual 3-D Display for Smartphones Information     [employer] gives you secure access to your electronic health record. If you see a primary care provider, you can also send messages to your care team and make appointments. If you have questions, please call your primary care clinic.  If you do not have a primary care provider, please call [phone] and they will assist you.        Care EveryWhere ID     This is your Care EveryWhere ID. This could be used by other organizations to access your [facility] medical records  [id number]        Your Vitals Were     Pulse Temperature Respirations Height Pulse Oximetry BMI (Body Mass Index)    78 97.5  F (36.4  C) (Oral) 16 6' (1.829 m) 95% 28.21 kg/m2       Blood Pressure from Last 3 Encounters:   04/19/18 104/60   03/22/18 110/68   02/06/18 128/60    Weight from Last 3 Encounters:   04/19/18 208 lb (94.3 kg)   03/22/18 205 lb 12.8 oz (93.4 kg)   02/06/18 200 lb (90.7 kg)              We Performed the Following     EKG 12-lead complete w/read - Clinics        Primary Care Provider Office Phone # Fax #    Srinivasa Tello [phone]       [address] [place] [id number]        Equal Access to Services     TRIPP BUTLER AH: Brian Hamilton,  waphilltor tavares, qaybta kabenny calle, skye ricardoirene wing berlinomar ibrahimaarenetta ah. So [facility] [phone].    ATENCIÓN: Si pushpa walter, tiene a ojeda disposición servicios gratuitos de asistencia lingüística. Karen al [phone].    We comply with applicable federal civil rights laws and [place] laws. We do not discriminate on the basis of race, color, national origin, age, disability, sex, sexual orientation, or gender identity.            Thank you!     Thank you for choosing [facility]  for your care. Our goal is always to provide you with excellent care. Hearing back from our patients is one way we can continue to improve our services. Please take a few minutes to complete the written survey that you may receive in the mail after your visit with us. Thank you!             Your Updated Medication List - Protect others around you: Learn how to safely use, store and throw away your medicines at www.disposemymeds.org.          This list is accurate as of 4/19/18 10:38 AM.  Always use your most recent med list.                   Brand Name Dispense Instructions for use Diagnosis    aspirin 81 MG tablet      1 TABLET DAILY        dutasteride-tamsulosin HCl 0.5-0.4 MG Caps    NANCY    60 capsule    Take  One capsule by mouth every day    BPH (benign prostatic hyperplasia)       fish oil-omega-3 fatty acids 1000 MG capsule      Take 2 g by mouth daily.        GLUCOSAMINE PO      Take by mouth 2 times daily        hydrocortisone 1 % cream    CORTAID    30 g    Apply sparingly to affected area twice daily as needed.    Tinea cruris       naproxen sodium 220 MG tablet    ANAPROX    60 tablet    Take 1 tablet (220 mg) by mouth 2 times daily (with meals)        VITAMIN D PO      Take  by mouth.

## 2018-04-27 ENCOUNTER — TRANSFERRED RECORDS (OUTPATIENT)
Dept: HEALTH INFORMATION MANAGEMENT | Facility: CLINIC | Age: 83
End: 2018-04-27

## 2018-05-14 NOTE — PROGRESS NOTES
[place] - Rheumatology Clinic Visit     Jorge Salomon MRN# [id number]   YOB: 1933    Primary care provider: Srinivasa Tello  May 15, 2018          Assessment and Plan:   # Inflammatory arthritis  # Carpal tunnel syndrome S/p right release 5/18   # Anemia- macrocytic  # Leukopenia  # Increased inflammatory markers  # Sjogren syndrome (Dx decades ago)  # Chronic NSAID use  # Brother had Chron's disease   # Prostate cancer - completed radiotherapy 2017    Dry eyes: uses refresh eye drops    Uric acid < 5.   RF negative.     Creat , AST, ALT within normal limits 3/18    Turmeric and apple cider vinegar and honey - in the mornings. Patient asked me if this is okay. I said okay.      We discussed that patient has inflammatory arthritis. Likely rheumatoid arthritis. It may be seronegative. Carpal tunnel syndrome is likely related to this. We will do the following work up. We will meet again in few weeks to review the lab results and discuss management. In the meantime, we will try a prednisone course to see the response. Side effects discussed. Hold naproxen on prednisone. After that he can resume using naproxen PRN OTC.     Anemia: Colonoscopy- last one was 3-4 years ago. Negative per patient.   Also watch for myelodysplastic syndrome.     The labs from patient records are reviewed.     I will be back in touch with the patient through mychart/letter when results are available.     Patient agrees with the above mentioned treatment plan.     Most Recent Immunizations   Administered Date(s) Administered     Influenza (IIV3) PF 10/25/2007     TD (ADULT, 7+) 02/17/2015       Orders Placed This Encounter   Procedures     XR Hand Right G/E 3 Views     Cyclic Citrullinated Peptide Antibody IgG     Anti Nuclear Dariana IgG by IFA with Reflex     MEGAN antibody panel       Return in about 6 weeks (around 6/26/2018).    There are no discontinued medications.  Current Outpatient Prescriptions   Medication Sig Dispense  Refill     acetaminophen (TYLENOL) 500 MG tablet Take 1-2 tablets (500-1,000 mg) by mouth every 6 hours as needed for mild pain       ASPIRIN 81 MG OR TABS 1 TABLET DAILY       Cholecalciferol (VITAMIN D PO) Take  by mouth.       dutasteride-tamsulosin HCl (NANCY) 0.5-0.4 MG CAPS Take  One capsule by mouth every day 60 capsule 3     fish oil-omega-3 fatty acids (FISH OIL) 1000 MG capsule Take 2 g by mouth daily.       GLUCOSAMINE PO Take by mouth 2 times daily        hydrocortisone (CORTAID) 1 % cream Apply sparingly to affected area twice daily as needed. 30 g 1     naproxen sodium (ANAPROX) 220 MG tablet Take 1 tablet (220 mg) by mouth 2 times daily (with meals) 60 tablet        Kan Nelson MD  [facility]          Active Problem List:     Patient Active Problem List    Diagnosis Date Noted     Sjogren's syndrome (H) 04/19/2018     Priority: Medium     Patient reports his ophthalmologist diagnosed him with this 30-40 years ago        Carpal tunnel syndrome of right wrist 04/19/2018     Priority: Medium     Pelvic pain in male 05/31/2013     Priority: Medium     HYPERLIPIDEMIA LDL GOAL <100 10/31/2010     Priority: Medium     Prostate cancer (H) 06/19/2009     Priority: Medium     Macular puckering of retina      Priority: Medium     Irritable bowel syndrome 01/16/2003     Priority: Medium     Benign prostatic hyperplasia 01/16/2003     Priority: Medium     Problem list name updated by automated process. Provider to review and confirm       Benign neoplasm of colon 01/16/2003     Priority: Medium            History of Present Illness:     Chief Complaint   Patient presents with     Establish Care       May 14, 2018  Have you ever seen a rheumatologist No Who NA When NA  Joint pain history  Onset: pt states that he has body aches all over especially in hands and wrist, sx for about 2 months. Pt has elevated CRP and ESR  Involved joints: see above  Pain scale:  3.5/10     Wakes the patient  "from sleep : Yes  Morning stiffness:Yes for 30 minutes  Meds used:naproxen     Interim history  Since last visit:  1. Infections - No  2. New symptoms/medical problem - Yes/ had carpal tunnel surgery 2 weeks ago   3. Any side effects from Rheum medications -NA  3. ER visits/Hospitalizations/surgeries - Yes/ had carpal tunnel surgery   4. Last PCP visit: 1/12/17    Wt Readings from Last 4 Encounters:   05/15/18 92.2 kg (203 lb 3.2 oz)   04/19/18 94.3 kg (208 lb)   03/22/18 93.4 kg (205 lb 12.8 oz)   02/06/18 90.7 kg (200 lb)       No h/o unintentional weight loss, fevers, rash, swollen glands  No h/o gout  No family or personal history of psoriasis, ulcerative colitis or chron's disease.   Patient denies any raynauds  No h/o persistent shortness of breath, cough, chest pain  No h/o persistent vomiting, diarrhea, abdominal pain  No h/o hematochezia, hematuria, hemoptysis  No h/o seizures or strokes      BP Readings from Last 3 Encounters:   05/15/18 122/70   04/19/18 104/60   03/22/18 110/68              Review of Systems:   Complete ROS negative except for symptoms mentioned in the HPI          Past Medical History:     Past Medical History:   Diagnosis Date     Benign neoplasm of colon 6/98, 3/05    Hyperplastic polyp on both procedures.     Dry eyes      Hernia, abdominal      Hypertrophy (benign) of prostate     hx of episodic prostatits     Internal hemorrhoids      Irritable bowel syndrome     IBS     Macular puckering of retina      Mitral valve prolapse      Mumps      Other disorders of vitreous     Vitreo-macular traction syndrome, left eye     Pure hypercholesterolemia      Past Surgical History:   Procedure Laterality Date     COLONOSCOPY  6/98    one \"1/4 inch hyperplastic\" polyp.     COLONOSCOPY  3/2005    One hyperplastic polyp     CYSTOSCOPY       Full thickness macular hole, left eye  02/9/20008     HERNIORRHAPHY INGUINAL  7/9/2014    Procedure: HERNIORRHAPHY INGUINAL;  Surgeon: Reji Engle " "MD Nicholas;  Location: [facility] OR     Left eye cataract  2000     Left vitreoretinal surgery  08     REMOVAL OF SPERM DUCT(S)  1970     Right eye Cataract  2012     VASECTOMY              Social History:     Social History     Occupational History     Not on file.     Social History Main Topics     Smoking status: Never Smoker     Smokeless tobacco: Never Used     Alcohol use No     Drug use: No     Sexual activity: Not Currently            Family History:     Family History   Problem Relation Age of Onset     HEART DISEASE Mother       age 70. Had Heart Failure / inactive thyroid treatment cause cardiac failure     Thyroid Disease Mother      inactive thyroid, also \"2-3\" siblings     Prostate Cancer Father      Fatal Prostate CA,  age 80     DIABETES Brother      Born 1934.     HEART DISEASE Brother      Fatal MI, had diabetes.  age 82     HEART DISEASE Brother      Born 1917 (Jarrod). Has had pacemaker.     HEART DISEASE Sister      Born 192 (Kim), unspecified \"heart problems\"     Family History Negative Sister      Born 191 (Ayde).      Respiratory Brother       age 75, COPD/hip fracture/pneumonia            Allergies:     Allergies   Allergen Reactions     No Known Allergies             Medications:     Current Outpatient Prescriptions   Medication Sig Dispense Refill     acetaminophen (TYLENOL) 500 MG tablet Take 1-2 tablets (500-1,000 mg) by mouth every 6 hours as needed for mild pain       ASPIRIN 81 MG OR TABS 1 TABLET DAILY       Cholecalciferol (VITAMIN D PO) Take  by mouth.       dutasteride-tamsulosin HCl (NANCY) 0.5-0.4 MG CAPS Take  One capsule by mouth every day 60 capsule 3     fish oil-omega-3 fatty acids (FISH OIL) 1000 MG capsule Take 2 g by mouth daily.       GLUCOSAMINE PO Take by mouth 2 times daily        hydrocortisone (CORTAID) 1 % cream Apply sparingly to affected area twice daily as needed. 30 g 1     naproxen sodium (ANAPROX) 220 MG tablet Take 1 tablet (220 " "mg) by mouth 2 times daily (with meals) 60 tablet             Physical Exam:   Blood pressure 122/70, pulse 82, temperature 97.9  F (36.6  C), temperature source Oral, height 1.816 m (5' 11.5\"), weight 92.2 kg (203 lb 3.2 oz), SpO2 96 %.  Wt Readings from Last 4 Encounters:   05/15/18 92.2 kg (203 lb 3.2 oz)   04/19/18 94.3 kg (208 lb)   03/22/18 93.4 kg (205 lb 12.8 oz)   02/06/18 90.7 kg (200 lb)       Constitutional: well-developed, appearing stated age; cooperative  Eyes: normal conjunctiva, sclera  ENT: nl external ears, nose, lips.No mucous membrane lesions, normal saliva pool  Neck: no cervical lymphadenopathy  Resp: lungs clear to auscultation in the bases,   CV: RRR, no added sounds  GI: Abdomen soft and no tenderness  : not tested  Lymph: no cervical, supraclavicular or epitrochlear nodes  MS: Synovitis in bilateral wrists, 2nd and 3rd MCPs. Right 2nd PIP and left 3rd PIPs synovitis also. Flexion contracture in right 2nd PIP.   All shoulder, elbow, wrist, MCP/PIP/DIP, hip, knee, ankle, and foot MTP/IP joints were examined and  found without active synovitis or major deformity. Full ROM.  No dactylitis,  tenosynovitis, enthesopathy.  Skin: no rash in exposed areas  Psych: nl judgement, orientation, memory, affect.         Data:         Kan Nelson MD    [facility]    "

## 2018-05-15 ENCOUNTER — OFFICE VISIT (OUTPATIENT)
Dept: RHEUMATOLOGY | Facility: CLINIC | Age: 83
End: 2018-05-15
Attending: INTERNAL MEDICINE
Payer: COMMERCIAL

## 2018-05-15 ENCOUNTER — RADIANT APPOINTMENT (OUTPATIENT)
Dept: GENERAL RADIOLOGY | Facility: CLINIC | Age: 83
End: 2018-05-15
Attending: INTERNAL MEDICINE
Payer: COMMERCIAL

## 2018-05-15 VITALS
OXYGEN SATURATION: 96 % | HEART RATE: 82 BPM | BODY MASS INDEX: 27.52 KG/M2 | WEIGHT: 203.2 LBS | HEIGHT: 72 IN | TEMPERATURE: 97.9 F | DIASTOLIC BLOOD PRESSURE: 70 MMHG | SYSTOLIC BLOOD PRESSURE: 122 MMHG

## 2018-05-15 DIAGNOSIS — M19.90 INFLAMMATORY ARTHRITIS: Primary | ICD-10-CM

## 2018-05-15 DIAGNOSIS — M19.90 INFLAMMATORY ARTHRITIS: ICD-10-CM

## 2018-05-15 PROCEDURE — 99204 OFFICE O/P NEW MOD 45 MIN: CPT | Performed by: INTERNAL MEDICINE

## 2018-05-15 PROCEDURE — 86235 NUCLEAR ANTIGEN ANTIBODY: CPT | Performed by: INTERNAL MEDICINE

## 2018-05-15 PROCEDURE — 86039 ANTINUCLEAR ANTIBODIES (ANA): CPT | Performed by: INTERNAL MEDICINE

## 2018-05-15 PROCEDURE — 86038 ANTINUCLEAR ANTIBODIES: CPT | Performed by: INTERNAL MEDICINE

## 2018-05-15 PROCEDURE — 86200 CCP ANTIBODY: CPT | Performed by: INTERNAL MEDICINE

## 2018-05-15 PROCEDURE — 73130 X-RAY EXAM OF HAND: CPT | Mod: RT

## 2018-05-15 PROCEDURE — 36415 COLL VENOUS BLD VENIPUNCTURE: CPT | Performed by: INTERNAL MEDICINE

## 2018-05-15 RX ORDER — PREDNISONE 5 MG/1
TABLET ORAL
Qty: 45 TABLET | Refills: 0 | Status: SHIPPED | OUTPATIENT
Start: 2018-05-15 | End: 2018-05-30

## 2018-05-15 RX ORDER — ACETAMINOPHEN 500 MG
500-1000 TABLET ORAL EVERY 6 HOURS PRN
COMMUNITY
Start: 2018-05-15

## 2018-05-15 ASSESSMENT — ROUTINE ASSESSMENT OF PATIENT INDEX DATA (RAPID3)
TOTAL RAPID3 SCORE: 10.2
RAPID3 INTERPRETATION: MODERATE 6.1-12.0

## 2018-05-15 NOTE — MR AVS SNAPSHOT
After Visit Summary   5/15/2018    Jorge Salomon    MRN: [id number]           Patient Information     Date Of Birth          3/31/1933        Visit Information        Provider Department      5/15/2018 12:00 PM Kan Nelson MD [facility]        Today's Diagnoses     Inflammatory arthritis    -  1       Follow-ups after your visit        Follow-up notes from your care team     Return in about 6 weeks (around 6/26/2018).      Your next 10 appointments already scheduled     Aug 07, 2018 10:30 AM CDT   Return Visit with Marek Miles MD   [facility] ([facility])    [address]  [address]  [facility] [address] [phone]              Future tests that were ordered for you today     Open Future Orders        Priority Expected Expires Ordered    XR Hand Right G/E 3 Views Routine 5/15/2018 12/11/2018 5/15/2018            Who to contact     If you have questions or need follow up information about today's clinic visit or your schedule please contact [facility] directly at [phone].  Normal or non-critical lab and imaging results will be communicated to you by MyChart, letter or phone within 4 business days after the clinic has received the results. If you do not hear from us within 7 days, please contact the clinic through [employer] or phone. If you have a critical or abnormal lab result, we will notify you by phone as soon as possible.  Submit refill requests through [employer] or call your pharmacy and they will forward the refill request to us. Please allow 3 business days for your refill to be completed.          Additional Information About Your Visit        MyChart Information     [employer] gives you secure access to your electronic health record. If you see a primary care provider, you can also send messages to your care team and make appointments. If you  "have questions, please call your primary care clinic.  If you do not have a primary care provider, please call [phone] and they will assist you.        Care EveryWhere ID     This is your Care EveryWhere ID. This could be used by other organizations to access your [facility] medical records  [id number]        Your Vitals Were     Pulse Temperature Height Pulse Oximetry BMI (Body Mass Index)       82 97.9  F (36.6  C) (Oral) 1.816 m (5' 11.5\") 96% 27.95 kg/m2        Blood Pressure from Last 3 Encounters:   05/15/18 122/70   04/19/18 104/60   03/22/18 110/68    Weight from Last 3 Encounters:   05/15/18 92.2 kg (203 lb 3.2 oz)   04/19/18 94.3 kg (208 lb)   03/22/18 93.4 kg (205 lb 12.8 oz)              We Performed the Following     Anti Nuclear Dariana IgG by IFA with Reflex     Cyclic Citrullinated Peptide Antibody IgG     MEGAN antibody panel          Today's Medication Changes          These changes are accurate as of 5/15/18 12:48 PM.  If you have any questions, ask your nurse or doctor.               Start taking these medicines.        Dose/Directions    predniSONE 5 MG tablet   Commonly known as:  DELTASONE   Used for:  Inflammatory arthritis   Started by:  Kan Nelson MD        Take 15 mg PO daily X 2 weeks   Quantity:  45 tablet   Refills:  0            Where to get your medicines      These medications were sent to [facility] [address] [place] [address]     Phone:  [phone]     predniSONE 5 MG tablet                Primary Care Provider Office Phone # Fax #    Srinivasa Tello [phone]       [address] [place] [address]        Equal Access to Services     ANNMARIE BUTLER : Brian Hamilton, madison tavares, surendra kabenny calle, skye marc. So [facility] [phone].    ATENCIÓN: Si habla español, tiene a ojeda disposición servicios gratuitos de " asistencia lingüística. Karen al [phone].    We comply with applicable federal civil rights laws and [place] laws. We do not discriminate on the basis of race, color, national origin, age, disability, sex, sexual orientation, or gender identity.            Thank you!     Thank you for choosing [facility]  for your care. Our goal is always to provide you with excellent care. Hearing back from our patients is one way we can continue to improve our services. Please take a few minutes to complete the written survey that you may receive in the mail after your visit with us. Thank you!             Your Updated Medication List - Protect others around you: Learn how to safely use, store and throw away your medicines at www.disposemymeds.org.          This list is accurate as of 5/15/18 12:48 PM.  Always use your most recent med list.                   Brand Name Dispense Instructions for use Diagnosis    acetaminophen 500 MG tablet    TYLENOL     Take 1-2 tablets (500-1,000 mg) by mouth every 6 hours as needed for mild pain        aspirin 81 MG tablet      1 TABLET DAILY        dutasteride-tamsulosin HCl 0.5-0.4 MG Caps    NANCY    60 capsule    Take  One capsule by mouth every day    BPH (benign prostatic hyperplasia)       fish oil-omega-3 fatty acids 1000 MG capsule      Take 2 g by mouth daily.        GLUCOSAMINE PO      Take by mouth 2 times daily        hydrocortisone 1 % cream    CORTAID    30 g    Apply sparingly to affected area twice daily as needed.    Tinea cruris       naproxen sodium 220 MG tablet    ANAPROX    60 tablet    Take 1 tablet (220 mg) by mouth 2 times daily (with meals)        predniSONE 5 MG tablet    DELTASONE    45 tablet    Take 15 mg PO daily X 2 weeks    Inflammatory arthritis       VITAMIN D PO      Take  by mouth.

## 2018-05-15 NOTE — NURSING NOTE
"Chief Complaint   Patient presents with     [facility] Care       Initial /70 (BP Location: Left arm, Patient Position: Chair, Cuff Size: Adult Regular)  Pulse 82  Temp 97.9  F (36.6  C) (Oral)  Ht 1.816 m (5' 11.5\")  Wt 92.2 kg (203 lb 3.2 oz)  SpO2 96%  BMI 27.95 kg/m2 Estimated body mass index is 27.95 kg/(m^2) as calculated from the following:    Height as of this encounter: 1.816 m (5' 11.5\").    Weight as of this encounter: 92.2 kg (203 lb 3.2 oz).  Medication Reconciliation: complete    Have you ever seen a rheumatologist No Who NA When NA  Joint pain history  Onset: pt states that he has body aches all over, sx for about 2 months. Pt has elevated CRP and ESR  Involved joints: see above  Pain scale:  3.5/10     Wakes the patient from sleep : Yes  Morning stiffness:Yes for 30 minutes  Meds used:naproxen    Interim history  Since last visit:  1. Infections - No  2. New symptoms/medical problem - Yes/ had carpal tunnel surgery 2 weeks ago   3. Any side effects from Rheum medications -NA  3. ER visits/Hospitalizations/surgeries - Yes/ had carpal tunnel surgery   4. Last PCP visit: 1/12/17  Wt Readings from Last 4 Encounters:   05/15/18 92.2 kg (203 lb 3.2 oz)   04/19/18 94.3 kg (208 lb)   03/22/18 93.4 kg (205 lb 12.8 oz)   02/06/18 90.7 kg (200 lb)     BP Readings from Last 3 Encounters:   05/15/18 122/70   04/19/18 104/60   03/22/18 110/68       "

## 2018-05-16 LAB
ANA PAT SER IF-IMP: ABNORMAL
ANA SER QL IF: POSITIVE
ANA TITR SER IF: ABNORMAL {TITER}
ENA RNP IGG SER IA-ACNC: 0.2 AI (ref 0–0.9)
ENA SCL70 IGG SER IA-ACNC: <0.2 AI (ref 0–0.9)
ENA SM IGG SER-ACNC: <0.2 AI (ref 0–0.9)
ENA SS-A IGG SER IA-ACNC: <0.2 AI (ref 0–0.9)
ENA SS-B IGG SER IA-ACNC: <0.2 AI (ref 0–0.9)

## 2018-05-18 LAB — CCP AB SER IA-ACNC: 2 U/ML

## 2018-05-21 NOTE — PROGRESS NOTES
Results released to [facility]:  PHAN antibody which is a generic antibody is borderline positive. Your specific antibodies for Sjogrens syndrome are normal. I will explain these when we meet in person.   CCP rheumatoid antibody is normal.     Sincerely    Kan Nelson MD  [facility]

## 2018-05-30 DIAGNOSIS — M19.90 INFLAMMATORY ARTHRITIS: ICD-10-CM

## 2018-05-30 RX ORDER — PREDNISONE 5 MG/1
TABLET ORAL
Qty: 45 TABLET | Refills: 0 | Status: SHIPPED | OUTPATIENT
Start: 2018-05-30 | End: 2018-06-29

## 2018-05-30 NOTE — TELEPHONE ENCOUNTER
Refill request for Prednisone. Current dose: 15mg    Medication last filled 5/15/18 Quantity 45 Refills 0    Last rheumatology office visit 5/15/18  Future rheumatology office visit 6/29/18    Lab Results   Component Value Date    WBC 3.7 03/22/2018    HGB 13.0 03/22/2018     03/22/2018    MCH 34.3 03/22/2018    MCHC 33.8 03/22/2018    RDW 12.9 03/22/2018     03/22/2018     Lab Results   Component Value Date    ALT 22 03/22/2018     Lab Results   Component Value Date    AST 19 03/22/2018     Lab Results   Component Value Date    CR 0.97 03/22/2018     Lab Results   Component Value Date    URIC 4.6 03/22/2018

## 2018-06-25 NOTE — PROGRESS NOTES
[place] - Rheumatology Clinic Visit     Jorge Salomon MRN# [id number]   YOB: 1933    Primary care provider: Srinivasa Tello  Jun 29, 2018          Assessment and Plan:   #  Seronegative Inflammatory arthritis  # Low titer PHAN positive; H/o Sjogren syndrome (Dx decades ago)- sicca syndrome   # Carpal tunnel syndrome S/p right release 5/18   # Anemia- macrocytic  # Leukopenia   # Increased inflammatory markers  # Chronic NSAID use  # Brother had Chron's disease   # Prostate cancer - completed radiotherapy 2017    Dry eyes: uses refresh eye drops  Dry mouth - he hydrates himself with water    Uric acid < 5.   RF negative.     Creat , AST, ALT within normal limits 3/18  We discussed lab results today:  PHAN antibody which is a generic antibody is borderline positive.   MEGAN antibody panel negative.   CCP rheumatoid antibody is normal.     Xray hand: no joint erosions noted. 6/18    We discussed that patient has seronegative rheumatoid arthritis. Carpal tunnel syndrome is likely related to this. Very responsive to prednisone even at 10 mg of prednisone per day. Patient completed few weeks of prednisone course now and his disease activity is low but he has started noticing that his joint pain is coming back.     Anemia: Colonoscopy- last one was 3-4 years ago. Negative per patient.   Also watch for myelodysplastic syndrome. I would refer patient to hematology to be evaluated for this as I see leukopenia and thrombocytopenia on and off for many years in past.     Until hematology evaluation is over, we will not start DMARD. Avoid anti-TNFs because of prostate cancer diagnosis in 2017. In his case, we may try hydroxychloroquine after hematology evaluation is completed. Until then okay to try prednisone 7.5mg PO daily X 4 weeks and then 5mg PO daily. Patient's RA disease activity is low. He will start prednisone if his joint pains are increasing again.   We discussed long term side effect risk of  prednisone.     Recommend elemental calcium 1200mg PO daily and vitamin D 800 IU daily .    Wt Readings from Last 4 Encounters:   06/29/18 91.9 kg (202 lb 11.2 oz)   05/15/18 92.2 kg (203 lb 3.2 oz)   04/19/18 94.3 kg (208 lb)   03/22/18 93.4 kg (205 lb 12.8 oz)     The labs from patient records are reviewed.     I will be back in touch with the patient through mychart/letter when results are available.     Patient agrees with the above mentioned treatment plan.     Most Recent Immunizations   Administered Date(s) Administered     Influenza (IIV3) PF 10/25/2007     TD (ADULT, 7+) 02/17/2015     Orders Placed This Encounter   Procedures     CBC with platelets differential     Erythrocyte sedimentation rate auto     CRP inflammation     ONC/HEME ADULT REFERRAL       Return in about 2 months (around 8/29/2018).    Medications Discontinued During This Encounter   Medication Reason     predniSONE (DELTASONE) 5 MG tablet Reorder     Current Outpatient Prescriptions   Medication Sig Dispense Refill     acetaminophen (TYLENOL) 500 MG tablet Take 1-2 tablets (500-1,000 mg) by mouth every 6 hours as needed for mild pain       ASPIRIN 81 MG OR TABS 1 TABLET DAILY       calcium-vitamin D (CALTRATE) 600-400 MG-UNIT per tablet Take 1 tablet by mouth 2 times daily 60 tablet      Cholecalciferol (VITAMIN D PO) Take  by mouth.       dutasteride-tamsulosin HCl (NANCY) 0.5-0.4 MG CAPS Take  One capsule by mouth every day 60 capsule 3     fish oil-omega-3 fatty acids (FISH OIL) 1000 MG capsule Take 2 g by mouth daily.       GLUCOSAMINE PO Take by mouth 2 times daily        hydrocortisone (CORTAID) 1 % cream Apply sparingly to affected area twice daily as needed. 30 g 1     naproxen sodium (ANAPROX) 220 MG tablet Take 1 tablet (220 mg) by mouth 2 times daily (with meals) 60 tablet      predniSONE (DELTASONE) 2.5 MG tablet June 29, 2018: 7.5mg Po daily x 1 month and then 5mg PO daily 120 tablet 2       Kan Nelson,  MD [facility]          Active Problem List:     Patient Active Problem List    Diagnosis Date Noted     Sjogren's syndrome (H) 04/19/2018     Priority: Medium     Patient reports his ophthalmologist diagnosed him with this 30-40 years ago        Carpal tunnel syndrome of right wrist 04/19/2018     Priority: Medium     Pelvic pain in male 05/31/2013     Priority: Medium     HYPERLIPIDEMIA LDL GOAL <100 10/31/2010     Priority: Medium     Prostate cancer (H) 06/19/2009     Priority: Medium     Macular puckering of retina      Priority: Medium     Irritable bowel syndrome 01/16/2003     Priority: Medium     Benign prostatic hyperplasia 01/16/2003     Priority: Medium     Problem list name updated by automated process. Provider to review and confirm       Benign neoplasm of colon 01/16/2003     Priority: Medium            History of Present Illness:     Chief Complaint   Patient presents with     RECHECK       May 14, 2018  Have you ever seen a rheumatologist No Who NA When NA  Joint pain history  Onset: pt states that he has body aches all over especially in hands and wrist, sx for about 2 months. Pt has elevated CRP and ESR  Involved joints: see above  Pain scale:  3.5/10     Wakes the patient from sleep : Yes  Morning stiffness:Yes for 30 minutes  Meds used:naproxen     Interim history  Since last visit:  1. Infections - No  2. New symptoms/medical problem - Yes/ had carpal tunnel surgery 2 weeks ago   3. Any side effects from Rheum medications -NA  3. ER visits/Hospitalizations/surgeries - Yes/ had carpal tunnel surgery   4. Last PCP visit: 1/12/17    Wt Readings from Last 4 Encounters:   06/29/18 91.9 kg (202 lb 11.2 oz)   05/15/18 92.2 kg (203 lb 3.2 oz)   04/19/18 94.3 kg (208 lb)   03/22/18 93.4 kg (205 lb 12.8 oz)       No h/o unintentional weight loss, fevers, rash, swollen glands  No h/o gout  No family or personal history of psoriasis, ulcerative colitis or chron's disease.   Patient denies any  "raynauds  No h/o persistent shortness of breath, cough, chest pain  No h/o persistent vomiting, diarrhea, abdominal pain  No h/o hematochezia, hematuria, hemoptysis  No h/o seizures or strokes    June 29, 2018  Have you ever seen a rheumatologist yes Who you When 05/15/2018                                         Joint pain history, arthritis  Onset:   Involved joints: bilateral knees, ankles, shoulders and hands  Pain scale:  2/10   - 4 in the morning  Wakes the patient from sleep : No  Morning stiffness:Yes for 60 minutes  Meds used:Prednisone, Naproxen Sodium (not on both now)     Interim history  Since last visit:  1. Infections - No  2. New symptoms/medical problem - No  3. Any side effects from Rheum medications -None  3. ER visits/Hospitalizations/surgeries - No  4. Last PCP visit: 01/12/2017      BP Readings from Last 3 Encounters:   06/29/18 106/68   05/15/18 122/70   04/19/18 104/60          Review of Systems:   Complete ROS negative except for symptoms mentioned in the HPI          Past Medical History:     Past Medical History:   Diagnosis Date     Benign neoplasm of colon 6/98, 3/05    Hyperplastic polyp on both procedures.     Dry eyes      Hernia, abdominal      Hypertrophy (benign) of prostate     hx of episodic prostatits     Internal hemorrhoids      Irritable bowel syndrome     IBS     Macular puckering of retina      Mitral valve prolapse      Mumps      Other disorders of vitreous     Vitreo-macular traction syndrome, left eye     Pure hypercholesterolemia      Past Surgical History:   Procedure Laterality Date     COLONOSCOPY  6/98    one \"1/4 inch hyperplastic\" polyp.     COLONOSCOPY  3/2005    One hyperplastic polyp     CYSTOSCOPY       Full thickness macular hole, left eye  02/9/20008     HERNIORRHAPHY INGUINAL  7/9/2014    Procedure: HERNIORRHAPHY INGUINAL;  Surgeon: Reji Engle MD;  Location: [facility] OR     Left eye cataract  01/01/2000     Left vitreoretinal surgery  2/8/08     " "REMOVAL OF SPERM DUCT(S)  1970     Right eye Cataract  2012     VASECTOMY              Social History:     Social History     Occupational History     Not on file.     Social History Main Topics     Smoking status: Never Smoker     Smokeless tobacco: Never Used     Alcohol use No     Drug use: No     Sexual activity: Not Currently          Family History:     Family History   Problem Relation Age of Onset     HEART DISEASE Mother       age 70. Had Heart Failure / inactive thyroid treatment cause cardiac failure     Thyroid Disease Mother      inactive thyroid, also \"2-3\" siblings     Prostate Cancer Father      Fatal Prostate CA,  age 80     Diabetes Brother      Born 1934.     HEART DISEASE Brother      Fatal MI, had diabetes.  age 82     HEART DISEASE Brother      Born 1917 (Jarrod). Has had pacemaker.     HEART DISEASE Sister      Born 192 (Kim), unspecified \"heart problems\"     Family History Negative Sister      Born 191 (Ayde).      Respiratory Brother       age 75, COPD/hip fracture/pneumonia          Allergies:     Allergies   Allergen Reactions     No Known Allergies           Medications:     Current Outpatient Prescriptions   Medication Sig Dispense Refill     acetaminophen (TYLENOL) 500 MG tablet Take 1-2 tablets (500-1,000 mg) by mouth every 6 hours as needed for mild pain       ASPIRIN 81 MG OR TABS 1 TABLET DAILY       calcium-vitamin D (CALTRATE) 600-400 MG-UNIT per tablet Take 1 tablet by mouth 2 times daily 60 tablet      Cholecalciferol (VITAMIN D PO) Take  by mouth.       dutasteride-tamsulosin HCl (NANCY) 0.5-0.4 MG CAPS Take  One capsule by mouth every day 60 capsule 3     fish oil-omega-3 fatty acids (FISH OIL) 1000 MG capsule Take 2 g by mouth daily.       GLUCOSAMINE PO Take by mouth 2 times daily        hydrocortisone (CORTAID) 1 % cream Apply sparingly to affected area twice daily as needed. 30 g 1     naproxen sodium (ANAPROX) 220 MG tablet Take 1 tablet (220 mg) " by mouth 2 times daily (with meals) 60 tablet      predniSONE (DELTASONE) 2.5 MG tablet June 29, 2018: 7.5mg Po daily x 1 month and then 5mg PO daily 120 tablet 2            Physical Exam:   Blood pressure 106/68, pulse 93, temperature 98.4  F (36.9  C), temperature source Oral, weight 91.9 kg (202 lb 11.2 oz), SpO2 93 %.  Wt Readings from Last 4 Encounters:   06/29/18 91.9 kg (202 lb 11.2 oz)   05/15/18 92.2 kg (203 lb 3.2 oz)   04/19/18 94.3 kg (208 lb)   03/22/18 93.4 kg (205 lb 12.8 oz)     Constitutional: well-developed, appearing stated age; cooperative  MS: Synovitis in bilateral wrists, 2nd and 3rd MCPs- RESOLVED. Right 2nd PIP and left 3rd PIPs synovitis RESOLVED. Flexion contracture in right 2nd PIP.   Skin: no rash in exposed areas  Psych: nl judgement, orientation, memory, affect.         Data:         Kan Nelson MD    [facility]

## 2018-06-29 ENCOUNTER — OFFICE VISIT (OUTPATIENT)
Dept: RHEUMATOLOGY | Facility: CLINIC | Age: 83
End: 2018-06-29
Payer: COMMERCIAL

## 2018-06-29 VITALS
DIASTOLIC BLOOD PRESSURE: 68 MMHG | TEMPERATURE: 98.4 F | HEART RATE: 93 BPM | BODY MASS INDEX: 27.88 KG/M2 | OXYGEN SATURATION: 93 % | SYSTOLIC BLOOD PRESSURE: 106 MMHG | WEIGHT: 202.7 LBS

## 2018-06-29 DIAGNOSIS — M19.90 INFLAMMATORY ARTHRITIS: Primary | ICD-10-CM

## 2018-06-29 DIAGNOSIS — D61.818 PANCYTOPENIA (H): ICD-10-CM

## 2018-06-29 LAB
BASOPHILS # BLD AUTO: 0 10E9/L (ref 0–0.2)
BASOPHILS NFR BLD AUTO: 0.2 %
DIFFERENTIAL METHOD BLD: ABNORMAL
EOSINOPHIL # BLD AUTO: 0.2 10E9/L (ref 0–0.7)
EOSINOPHIL NFR BLD AUTO: 3.3 %
ERYTHROCYTE [DISTWIDTH] IN BLOOD BY AUTOMATED COUNT: 12.8 % (ref 10–15)
ERYTHROCYTE [SEDIMENTATION RATE] IN BLOOD BY WESTERGREN METHOD: 9 MM/H (ref 0–20)
HCT VFR BLD AUTO: 39.3 % (ref 40–53)
HGB BLD-MCNC: 13.9 G/DL (ref 13.3–17.7)
LYMPHOCYTES # BLD AUTO: 0.9 10E9/L (ref 0.8–5.3)
LYMPHOCYTES NFR BLD AUTO: 15 %
MCH RBC QN AUTO: 36 PG (ref 26.5–33)
MCHC RBC AUTO-ENTMCNC: 35.4 G/DL (ref 31.5–36.5)
MCV RBC AUTO: 102 FL (ref 78–100)
MONOCYTES # BLD AUTO: 1 10E9/L (ref 0–1.3)
MONOCYTES NFR BLD AUTO: 15.5 %
NEUTROPHILS # BLD AUTO: 4 10E9/L (ref 1.6–8.3)
NEUTROPHILS NFR BLD AUTO: 66 %
PLATELET # BLD AUTO: 158 10E9/L (ref 150–450)
RBC # BLD AUTO: 3.86 10E12/L (ref 4.4–5.9)
WBC # BLD AUTO: 6.1 10E9/L (ref 4–11)

## 2018-06-29 PROCEDURE — 85652 RBC SED RATE AUTOMATED: CPT | Performed by: INTERNAL MEDICINE

## 2018-06-29 PROCEDURE — 99213 OFFICE O/P EST LOW 20 MIN: CPT | Performed by: INTERNAL MEDICINE

## 2018-06-29 PROCEDURE — 86140 C-REACTIVE PROTEIN: CPT | Performed by: INTERNAL MEDICINE

## 2018-06-29 PROCEDURE — 85025 COMPLETE CBC W/AUTO DIFF WBC: CPT | Performed by: INTERNAL MEDICINE

## 2018-06-29 PROCEDURE — 36415 COLL VENOUS BLD VENIPUNCTURE: CPT | Performed by: INTERNAL MEDICINE

## 2018-06-29 RX ORDER — PREDNISONE 2.5 MG/1
TABLET ORAL
Qty: 120 TABLET | Refills: 2 | Status: SHIPPED | OUTPATIENT
Start: 2018-06-29 | End: 2018-09-07

## 2018-06-29 NOTE — MR AVS SNAPSHOT
After Visit Summary   6/29/2018    Jorge Salomon    MRN: [id number]           Patient Information     Date Of Birth          3/31/1933        Visit Information        Provider Department      6/29/2018 1:30 PM Kan Nelson MD [facility]        Today's Diagnoses     Inflammatory arthritis    -  1    Pancytopenia (H)          Care Instructions      Living with Rheumatoid Arthritis    Rheumatoid arthritis (RA) is a chronic disease, but it doesn't have to keep you from being active. It is an autoimmune disease and your immune system attacks the lining of your joints. You can help control RA with exercise and a healthy lifestyle. Be sure to see your healthcare provider for scheduled checkups and lab work. At some point, you may be referred to a rheumatologist (a healthcare provider who specializes in arthritis and related diseases).  Make exercise part of your life  Gentle exercise can help lessen your pain. Keep the following in mind:    Choose exercises that improve joint motion and make your muscles stronger. Your healthcare provider or a physical therapist may suggest a few.    Most people should exercise for at least 30 minutes a day on most days of the week. This can be broken up into shorter periods throughout the day.    Try walking, riding a bike, or doing exercises in a warm pool. Look for programs in your community for people with arthritis.     Don t push yourself too hard at first. Slowly build up over time.    Make sure you warm up for 5 to 10 minutes each time you exercise. Stretching and flexibility exercises are often helpful.     If pain and stiffness increase, don't exercise as hard or as long.  Watch your weight  If you weigh more than you should, your weight-bearing joints are under extra pressure. This makes your symptoms worse. To reduce pain and stiffness, try shedding a few of those extra pounds. The tips below may help:    Start a weight-loss program  with the help of your healthcare provider.    Ask your friends and family for support.    Join a weight-loss group.  Learn ways to cope  Most people with long-term conditions find it a challenge to deal with the emotions that often go along with their conditions. With rheumatoid arthritis, there is also pain.     Work with your healthcare provider on ways to lessen pain. Medicines, use of heat and cold, and other methods are available.    Learn to relax. Although it may not be easy, it does help lessen stress, anxiety, and pain. Simple deep-breathing exercises, meditation, and yoga are examples of relaxation techniques.    Depression is common with long-term conditions. If you feel depressed, make sure you talk with your healthcare provider. Again, treatments, like medicine and counseling, are available.  Try to make your day easier  There are things you can do every day to protect your joints:    Learn to balance rest with activity. Even on days when you have few symptoms, rest is still important.    Ask friends and family members for help. Help with simple things can make a big difference for you. For example, you might ask someone to change a light bulb, or take out your weekly garbage.    Use assistive devices, which are special tools that reduce strain and protect joints. For example:  ? Long-handled reachers or grabbers for reaching high and low  ? Jar-openers, two-handled cups, and button --all of these devices help to protect your fingers, hands, and wrists  ? Large  for pencils, pens, kitchen and garden tools  The Arthritis Foundation has many additional suggestions about protecting your joints. Go to their website at http://www.arthritis.org.  Use mobility and other aids  People with arthritis and other problems affecting the joints often use mobility aids, to help with walking. For example, they may use canes or walkers. They may also use splints or braces to support joints. Talk with your  healthcare provider or therapist about these aids. For instance, you might benefit from:    Use a cane to ease knee or hip pain and help prevent falls    Splints for your wrists or other joints    A brace to support a weak knee joint  Date Last Reviewed: 2/14/2016 2000-2017 [employer]. [address] [place] [address]. All rights reserved. This information is not intended as a substitute for professional medical care. Always follow your healthcare professional's instructions.        Rheumatoid Arthritis  You have rheumatoid arthritis (RA). This is a chronic disease that mainly affects the joints. Sometimes, it also affects other parts of the body. RA is an autoimmune disease. This means that the body s immune system, which normally protects the body, causes harm instead. With RA, the immune system attacks the joints. The reason for this is unknown.  In most cases, RA affects pairs of joints on both sides of the body. These can include joints in both elbows, wrists, hands, knees, feet, or ankles. The disease often starts slowly. Early symptoms include stiffness, muscle aches, weakness, and fatigue. Over time, the joints may begin to hurt. They may also become warm, swollen, or tender. Symptoms may feel worse in the morning after a night s rest and may get better with activity.  With RA, you may have periods of active disease (when symptoms worsen) followed by periods of remission (when symptoms improve or go away). There is no known cure for RA. But medical treatment can slow or stop the progress of the disease. It can also help relieve symptoms. For advanced disease, surgery, such as joint replacement, may be the best option.  Home care    If you were prescribed a medicine, take it as directed.    To help control swelling and pain, acetaminophen, ibuprofen, or another NSAID (non-steroidal anti-inflammatory drug) may be recommended. Note: If you have chronic liver or kidney disease or ever  had a stomach ulcer or gastrointestinal bleeding, tell your healthcare provider before taking any of these medicines.    Some persons find relief with heat (hot shower, hot bath, or heating pad). Others prefer cold (ice in a plastic bag, wrapped in a towel). Try both. Then use the method you like best. Use heat or cold for about 20 minutes, a few times a day.    Exercise is a key part of treatment for RA. It helps reduce pain. It may also improve flexibility. Do your best to be active daily. Move your joints through their full range of motion each morning. Avoid staying in any one position for long periods of time. Take breaks throughout the day and move around. Also, ask your healthcare provider or physical therapist what exercises are best for you.    If you are overweight, lose weight. Extra weight puts stress on your joints.    If you smoke, quit. Smoking raises the risk of other problems linked to RA.    No herbal product or nutritional supplement has been proven to help RA. But treatments such as acupuncture and massage may help relieve pain.    Talk to your healthcare provider or occupational therapist about easier ways to perform daily tasks. This may include the use of assistive devices. These are special tools that can help with things like dressing, bathing, cooking, driving, and moving or getting around.  Follow-up care  Follow up with your healthcare provider, or as advised.   When to seek medical advice  Call your healthcare provider right away if any of these occur:    Increasing weakness, pale color of the skin, fainting    Chest pain or shortness of breath    Blood in vomit or stool (black or red color)    Changes in vision    Skin ulcers    Fever of 100.4 F (38 C) or higher, or as directed by your healthcare provider    New joint pain    New rash  Resources  To learn more about RA, contact:    Arthritis Foundation, [phone], www.arthritis.org    National [place] of Arthritis and  Musculoskeletal and Skin Diseases (NIAMS), 764.610.4602, www.niams.nih.gov     Date Last Reviewed: 3/1/2017    1902-8399 The [employer]. [address] [place] [address], [place] [address]. All rights reserved. This information is not intended as a substitute for professional medical care. Always follow your healthcare professional's instructions.                Follow-ups after your visit        Additional Services     ONC/HEME ADULT REFERRAL       Your provider has referred you to: [facility]: [place] [facility] [phone]   [email or web address]    Please be aware that coverage of these services is subject to the terms and limitations of your health insurance plan.  Call member services at your health plan with any benefit or coverage questions.      Please bring the following with you to your appointment:    (1) Any X-Rays, CTs or MRIs which have been performed.  Contact the facility where they were done to arrange for  prior to your scheduled appointment.   (2) List of current medications  (3) This referral request   (4) Any documents/labs given to you for this referral                  Follow-up notes from your care team     Return in about 2 months (around 8/29/2018).      Your next 10 appointments already scheduled     Aug 07, 2018 10:30 AM CDT   Return Visit with Marek Miles MD   [facility] ([facility])    [address]  [address]  [facility] [address] [phone]              Who to contact     If you have questions or need follow up information about today's clinic visit or your schedule please contact [facility] directly at [phone].  Normal or non-critical lab and imaging results will be communicated to you by MyChart, letter or phone within 4 business days after the clinic has received the results. If you do not hear from us within 7  days, please contact the clinic through [employer] or phone. If you have a critical or abnormal lab result, we will notify you by phone as soon as possible.  Submit refill requests through [employer] or call your pharmacy and they will forward the refill request to us. Please allow 3 business days for your refill to be completed.          Additional Information About Your Visit        [email or web address] Information     [employer] gives you secure access to your electronic health record. If you see a primary care provider, you can also send messages to your care team and make appointments. If you have questions, please call your primary care clinic.  If you do not have a primary care provider, please call [phone] and they will assist you.        Care EveryWhere ID     This is your Care EveryWhere ID. This could be used by other organizations to access your [place] medical records  [id number]        Your Vitals Were     Pulse Temperature Pulse Oximetry BMI (Body Mass Index)          93 98.4  F (36.9  C) (Oral) 93% 27.88 kg/m2         Blood Pressure from Last 3 Encounters:   06/29/18 106/68   05/15/18 122/70   04/19/18 104/60    Weight from Last 3 Encounters:   06/29/18 91.9 kg (202 lb 11.2 oz)   05/15/18 92.2 kg (203 lb 3.2 oz)   04/19/18 94.3 kg (208 lb)              We Performed the Following     CBC with platelets differential     CRP inflammation     Erythrocyte sedimentation rate auto     ONC/HEME ADULT REFERRAL          Today's Medication Changes          These changes are accurate as of 6/29/18  2:45 PM.  If you have any questions, ask your nurse or doctor.               These medicines have changed or have updated prescriptions.        Dose/Directions    predniSONE 2.5 MG tablet   Commonly known as:  DELTASONE   This may have changed:    - medication strength  - additional instructions   Used for:  Inflammatory arthritis   Changed by:  Kan Nelson MD        June 29, 2018: 7.5mg Po daily x 1 month and then  5mg PO daily   Quantity:  120 tablet   Refills:  2            Where to get your medicines      These medications were sent to [facility] [address] [place] [address]  [address] [place] [address]     Phone:  [phone]     predniSONE 2.5 MG tablet                Primary Care Provider Office Phone # Fax #    Srinivasa Tello [phone]       [address] [facility] [address]  [facility] [address]        Equal Access to Services     ANNMARIE BUTLER : Hadii aad ku hadasho Soomaali, waaxda luqadaha, qaybta kaalmada adeegyada, waxay idiin hayaan adeeg berlinaraazar castaneda . So [facility] [phone].    ATENCIÓN: Si habla español, tiene a ojeda disposición servicios gratuitos de asistencia lingüística. [facility] [phone].    We comply with applicable federal civil rights laws and [place] laws. We do not discriminate on the basis of race, color, national origin, age, disability, sex, sexual orientation, or gender identity.            Thank you!     Thank you for choosing [facility]  for your care. Our goal is always to provide you with excellent care. Hearing back from our patients is one way we can continue to improve our services. Please take a few minutes to complete the written survey that you may receive in the mail after your visit with us. Thank you!             Your Updated Medication List - Protect others around you: Learn how to safely use, store and throw away your medicines at www.disposemymeds.org.          This list is accurate as of 6/29/18  2:45 PM.  Always use your most recent med list.                   Brand Name Dispense Instructions for use Diagnosis    acetaminophen 500 MG tablet    TYLENOL     Take 1-2 tablets (500-1,000 mg) by mouth every 6 hours as needed for mild pain        aspirin 81 MG tablet      1 TABLET DAILY        calcium-vitamin D 600-400 MG-UNIT per tablet    CALTRATE    60 tablet    Take 1 tablet by mouth 2 times daily         dutasteride-tamsulosin HCl 0.5-0.4 MG Caps    NANCY    60 capsule    Take  One capsule by mouth every day    BPH (benign prostatic hyperplasia)       fish oil-omega-3 fatty acids 1000 MG capsule      Take 2 g by mouth daily.        GLUCOSAMINE PO      Take by mouth 2 times daily        hydrocortisone 1 % cream    CORTAID    30 g    Apply sparingly to affected area twice daily as needed.    Tinea cruris       naproxen sodium 220 MG tablet    ANAPROX    60 tablet    Take 1 tablet (220 mg) by mouth 2 times daily (with meals)        predniSONE 2.5 MG tablet    DELTASONE    120 tablet    June 29, 2018: 7.5mg Po daily x 1 month and then 5mg PO daily    Inflammatory arthritis       VITAMIN D PO      Take  by mouth.

## 2018-06-29 NOTE — NURSING NOTE
"Chief Complaint   Patient presents with     RECHECK       Initial There were no vitals taken for this visit. Estimated body mass index is 27.95 kg/(m^2) as calculated from the following:    Height as of 5/15/18: 1.816 m (5' 11.5\").    Weight as of 5/15/18: 92.2 kg (203 lb 3.2 oz).  Medication Reconciliation: complete    Have you ever seen a rheumatologist yes Who you When 05/15/2018    Joint pain history, arthritis  Onset:   Involved joints: bilateral knees, ankles, shoulders and hands  Pain scale:  2/10   - 4 in the morning  Wakes the patient from sleep : No  Morning stiffness:Yes for 60 minutes  Meds used:Prednisone, Naproxen Sodium    Interim history  Since last visit:  1. Infections - No  2. New symptoms/medical problem - No  3. Any side effects from Rheum medications -None  3. ER visits/Hospitalizations/surgeries - No  4. Last PCP visit: 01/12/2017  Wt Readings from Last 4 Encounters:   05/15/18 92.2 kg (203 lb 3.2 oz)   04/19/18 94.3 kg (208 lb)   03/22/18 93.4 kg (205 lb 12.8 oz)   02/06/18 90.7 kg (200 lb)     BP Readings from Last 3 Encounters:   05/15/18 122/70   04/19/18 104/60   03/22/18 110/68     "

## 2018-06-29 NOTE — PATIENT INSTRUCTIONS
Living with Rheumatoid Arthritis    Rheumatoid arthritis (RA) is a chronic disease, but it doesn't have to keep you from being active. It is an autoimmune disease and your immune system attacks the lining of your joints. You can help control RA with exercise and a healthy lifestyle. Be sure to see your healthcare provider for scheduled checkups and lab work. At some point, you may be referred to a rheumatologist (a healthcare provider who specializes in arthritis and related diseases).  Make exercise part of your life  Gentle exercise can help lessen your pain. Keep the following in mind:    Choose exercises that improve joint motion and make your muscles stronger. Your healthcare provider or a physical therapist may suggest a few.    Most people should exercise for at least 30 minutes a day on most days of the week. This can be broken up into shorter periods throughout the day.    Try walking, riding a bike, or doing exercises in a warm pool. Look for programs in your community for people with arthritis.     Don t push yourself too hard at first. Slowly build up over time.    Make sure you warm up for 5 to 10 minutes each time you exercise. Stretching and flexibility exercises are often helpful.     If pain and stiffness increase, don't exercise as hard or as long.  Watch your weight  If you weigh more than you should, your weight-bearing joints are under extra pressure. This makes your symptoms worse. To reduce pain and stiffness, try shedding a few of those extra pounds. The tips below may help:    Start a weight-loss program with the help of your healthcare provider.    Ask your friends and family for support.    Join a weight-loss group.  Learn ways to cope  Most people with long-term conditions find it a challenge to deal with the emotions that often go along with their conditions. With rheumatoid arthritis, there is also pain.     Work with your healthcare provider on ways to lessen pain. Medicines, use of  heat and cold, and other methods are available.    Learn to relax. Although it may not be easy, it does help lessen stress, anxiety, and pain. Simple deep-breathing exercises, meditation, and yoga are examples of relaxation techniques.    Depression is common with long-term conditions. If you feel depressed, make sure you talk with your healthcare provider. Again, treatments, like medicine and counseling, are available.  Try to make your day easier  There are things you can do every day to protect your joints:    Learn to balance rest with activity. Even on days when you have few symptoms, rest is still important.    Ask friends and family members for help. Help with simple things can make a big difference for you. For example, you might ask someone to change a light bulb, or take out your weekly garbage.    Use assistive devices, which are special tools that reduce strain and protect joints. For example:  ? Long-handled reachers or grabbers for reaching high and low  ? Jar-openers, two-handled cups, and button --all of these devices help to protect your fingers, hands, and wrists  ? Large  for pencils, pens, kitchen and garden tools  The Arthritis Foundation has many additional suggestions about protecting your joints. Go to their website at http://www.arthritis.org.  Use mobility and other aids  People with arthritis and other problems affecting the joints often use mobility aids, to help with walking. For example, they may use canes or walkers. They may also use splints or braces to support joints. Talk with your healthcare provider or therapist about these aids. For instance, you might benefit from:    Use a cane to ease knee or hip pain and help prevent falls    Splints for your wrists or other joints    A brace to support a weak knee joint  Date Last Reviewed: 2/14/2016 2000-2017 [employer]. [address] [place] [address], [place] [address]. All rights reserved. This information is not  intended as a substitute for professional medical care. Always follow your healthcare professional's instructions.        Rheumatoid Arthritis  You have rheumatoid arthritis (RA). This is a chronic disease that mainly affects the joints. Sometimes, it also affects other parts of the body. RA is an autoimmune disease. This means that the body s immune system, which normally protects the body, causes harm instead. With RA, the immune system attacks the joints. The reason for this is unknown.  In most cases, RA affects pairs of joints on both sides of the body. These can include joints in both elbows, wrists, hands, knees, feet, or ankles. The disease often starts slowly. Early symptoms include stiffness, muscle aches, weakness, and fatigue. Over time, the joints may begin to hurt. They may also become warm, swollen, or tender. Symptoms may feel worse in the morning after a night s rest and may get better with activity.  With RA, you may have periods of active disease (when symptoms worsen) followed by periods of remission (when symptoms improve or go away). There is no known cure for RA. But medical treatment can slow or stop the progress of the disease. It can also help relieve symptoms. For advanced disease, surgery, such as joint replacement, may be the best option.  Home care    If you were prescribed a medicine, take it as directed.    To help control swelling and pain, acetaminophen, ibuprofen, or another NSAID (non-steroidal anti-inflammatory drug) may be recommended. Note: If you have chronic liver or kidney disease or ever had a stomach ulcer or gastrointestinal bleeding, tell your healthcare provider before taking any of these medicines.    Some persons find relief with heat (hot shower, hot bath, or heating pad). Others prefer cold (ice in a plastic bag, wrapped in a towel). Try both. Then use the method you like best. Use heat or cold for about 20 minutes, a few times a day.    Exercise is a key part of  treatment for RA. It helps reduce pain. It may also improve flexibility. Do your best to be active daily. Move your joints through their full range of motion each morning. Avoid staying in any one position for long periods of time. Take breaks throughout the day and move around. Also, ask your healthcare provider or physical therapist what exercises are best for you.    If you are overweight, lose weight. Extra weight puts stress on your joints.    If you smoke, quit. Smoking raises the risk of other problems linked to RA.    No herbal product or nutritional supplement has been proven to help RA. But treatments such as acupuncture and massage may help relieve pain.    Talk to your healthcare provider or occupational therapist about easier ways to perform daily tasks. This may include the use of assistive devices. These are special tools that can help with things like dressing, bathing, cooking, driving, and moving or getting around.  Follow-up care  Follow up with your healthcare provider, or as advised.   When to seek medical advice  Call your healthcare provider right away if any of these occur:    Increasing weakness, pale color of the skin, fainting    Chest pain or shortness of breath    Blood in vomit or stool (black or red color)    Changes in vision    Skin ulcers    Fever of 100.4 F (38 C) or higher, or as directed by your healthcare provider    New joint pain    New rash  Resources  To learn more about RA, contact:    Arthritis Foundation, [phone], www.arthritis.org    National [place] of Arthritis and Musculoskeletal and Skin Diseases (NIAMS), 953.111.1161, www.niams.nih.gov     Date Last Reviewed: 3/1/2017    2699-6761 [employer]. [address] [place] [address], [place] [address]. All rights reserved. This information is not intended as a substitute for professional medical care. Always follow your healthcare professional's instructions.

## 2018-06-30 LAB — CRP SERPL-MCNC: <2.9 MG/L (ref 0–8)

## 2018-07-06 NOTE — PROGRESS NOTES
Results released to [facility]:  Inflammatory markers are normalized.  Anemia resolved too.   White count has normalized.   Platelet count is within normal limits and stable.   Please let the blood specialist know that these blood cell count numbers normalized with prednisone so that they will evaluate why they were low prior to prednisone.         Sincerely    Kan Nelson MD  [facility]

## 2018-07-23 ENCOUNTER — TRANSFERRED RECORDS (OUTPATIENT)
Dept: HEALTH INFORMATION MANAGEMENT | Facility: CLINIC | Age: 83
End: 2018-07-23

## 2018-07-27 DIAGNOSIS — N40.0 BPH (BENIGN PROSTATIC HYPERPLASIA): ICD-10-CM

## 2018-07-27 RX ORDER — DUTASTERIDE 0.5 MG/1
0.5 CAPSULE, LIQUID FILLED ORAL DAILY
Qty: 90 CAPSULE | Refills: 1 | Status: SHIPPED | OUTPATIENT
Start: 2018-07-27 | End: 2019-01-24

## 2018-08-01 DIAGNOSIS — C61 MALIGNANT NEOPLASM OF PROSTATE (H): ICD-10-CM

## 2018-08-01 PROCEDURE — 84153 ASSAY OF PSA TOTAL: CPT | Performed by: UROLOGY

## 2018-08-01 PROCEDURE — 36415 COLL VENOUS BLD VENIPUNCTURE: CPT | Performed by: UROLOGY

## 2018-08-02 LAB — PSA SERPL-MCNC: 0.18 UG/L (ref 0–4)

## 2018-08-07 ENCOUNTER — OFFICE VISIT (OUTPATIENT)
Dept: UROLOGY | Facility: CLINIC | Age: 83
End: 2018-08-07
Payer: COMMERCIAL

## 2018-08-07 VITALS — WEIGHT: 202 LBS | HEART RATE: 84 BPM | HEIGHT: 72 IN | BODY MASS INDEX: 27.36 KG/M2 | OXYGEN SATURATION: 96 %

## 2018-08-07 DIAGNOSIS — C61 MALIGNANT NEOPLASM OF PROSTATE (H): Primary | ICD-10-CM

## 2018-08-07 LAB
ALBUMIN UR-MCNC: NEGATIVE MG/DL
APPEARANCE UR: CLEAR
BILIRUB UR QL STRIP: NEGATIVE
COLOR UR AUTO: YELLOW
GLUCOSE UR STRIP-MCNC: NEGATIVE MG/DL
HGB UR QL STRIP: NEGATIVE
KETONES UR STRIP-MCNC: NEGATIVE MG/DL
LEUKOCYTE ESTERASE UR QL STRIP: NEGATIVE
NITRATE UR QL: NEGATIVE
PH UR STRIP: 7.5 PH (ref 5–7)
SOURCE: ABNORMAL
SP GR UR STRIP: 1.01 (ref 1–1.03)
UROBILINOGEN UR STRIP-ACNC: 0.2 EU/DL (ref 0.2–1)

## 2018-08-07 PROCEDURE — 99213 OFFICE O/P EST LOW 20 MIN: CPT | Performed by: UROLOGY

## 2018-08-07 PROCEDURE — 81003 URINALYSIS AUTO W/O SCOPE: CPT | Mod: QW | Performed by: UROLOGY

## 2018-08-07 ASSESSMENT — PAIN SCALES - GENERAL: PAINLEVEL: NO PAIN (1)

## 2018-08-07 NOTE — NURSING NOTE
Pt was recently dx with RA.  Pt denies any voiding trouble.  Pt here for PSA results.  ROSIE Joyce, CMA

## 2018-08-07 NOTE — LETTER
8/7/2018       RE: Jorge Salomon  [address] [facility] [address]     Dear Colleague,    Thank you for referring your patient, Jorge Salomon, to the [facility] [place] at [facility]. Please see a copy of my visit note below.    Jorge is an 85-year-old gentleman with prostate cancer treated with external beam radiation therapy. His PSA is stable at 0.18. He is having no difficulty voiding and will soon be discontinuing his Marie and only take Avodart daily.  Other past medical history: Rheumatoid arthritis, hyperplastic polyp on colonoscopies, dry eyes, abdominal hernia, hemorrhoids, IBS, macular changes of retina, mitral valve prolapse, high cholesterol, colonoscopies, vasectomy, eye surgeries, inguinal hernia repair, nonsmoker  Medications: Tylenol, low-dose aspirin, calcium/vitamin D, vitamin D, Avodart, fish oil, glucosamine, prednisone 5 mg daily  Allergies: None  Exam: Alert and oriented, normocephalic, normal respirations, neuro grossly intact. Normal vital signs. Normal sphincter tone, no rectal mass or impaction, small benign prostate, normal seminal vesicles  Assessment: Adenocarcinoma of the prostate-nice response with external beam radiation therapy  Plan: See me in 6 months with PSA    Again, thank you for allowing me to participate in the care of your patient.      Sincerely,    Marek Miles MD

## 2018-08-07 NOTE — MR AVS SNAPSHOT
After Visit Summary   8/7/2018    Jorge Salomon    MRN: [id number]           Patient Information     Date Of Birth          3/31/1933        Visit Information        Provider Department      8/7/2018 10:30 AM Marek Miles MD [facility]        Today's Diagnoses     Benign prostatic hyperplasia    -  1    Malignant neoplasm of prostate (H)           Follow-ups after your visit        Your next 10 appointments already scheduled     Sep 07, 2018  1:30 PM CDT   [facility]    [address] [facility] [address]   [phone]            Feb 05, 2019 10:30 AM CST   Return Visit with Marek Miles MD   [facility] ([facility])    [address]  [address]  [facility] [id number] [phone]              Future tests that were ordered for you today     Open Future Orders        Priority Expected Expires Ordered    PSA tumor marker [[id number]] Routine 2/7/2019 8/7/2019 8/7/2018            Who to contact     If you have questions or need follow up information about today's clinic visit or your schedule please contact [facility] directly at [phone].  Normal or non-critical lab and imaging results will be communicated to you by MyChart, letter or phone within 4 business days after the clinic has received the results. If you do not hear from us within 7 days, please contact the clinic through MyChart or phone. If you have a critical or abnormal lab result, we will notify you by phone as soon as possible.  Submit refill requests through [facility] or call your pharmacy and they will forward the refill request to us. Please allow 3 business days for your refill to be completed.          Additional Information About Your Visit       "  MyChart Information     [email or web address] gives you secure access to your electronic health record. If you see a primary care provider, you can also send messages to your care team and make appointments. If you have questions, please call your primary care clinic.  If you do not have a primary care provider, please call [phone] and they will assist you.        Care EveryWhere ID     This is your Care EveryWhere ID. This could be used by other organizations to access your [place] medical records  [id number]        Your Vitals Were     Pulse Height Pulse Oximetry BMI (Body Mass Index)          84 1.816 m (5' 11.5\") 96% 27.78 kg/m2         Blood Pressure from Last 3 Encounters:   06/29/18 106/68   05/15/18 122/70   04/19/18 104/60    Weight from Last 3 Encounters:   08/07/18 91.6 kg (202 lb)   06/29/18 91.9 kg (202 lb 11.2 oz)   05/15/18 92.2 kg (203 lb 3.2 oz)              We Performed the Following     UA without Microscopic        Primary Care Provider Office Phone # Fax #    Srinivasa Tello [phone]       [address] [facility] [address]  [facility] [address]        Equal Access to Services     TRIPP BUTLER AH: Hadii aad ku hadasho Soomaali, waaxda luqadaha, qaybta kaalmada adeegyada, waxay idiin hayaan deioneg kharaazar laheather ah. So [facility] [phone].    ATENCIÓN: Si habla español, tiene a ojeda disposición servicios gratuitos de asistencia lingüística. Llame al [phone].    We comply with applicable federal civil rights laws and [place] laws. We do not discriminate on the basis of race, color, national origin, age, disability, sex, sexual orientation, or gender identity.            Thank you!     Thank you for choosing [facility] [place]  for your care. Our goal is always to provide you with excellent care. Hearing back from our patients is one way we can continue to improve our services. Please take a few minutes to complete the written survey that you may receive in " the mail after your visit with us. Thank you!             Your Updated Medication List - Protect others around you: Learn how to safely use, store and throw away your medicines at www.disposemymeds.org.          This list is accurate as of 8/7/18 10:41 AM.  Always use your most recent med list.                   Brand Name Dispense Instructions for use Diagnosis    acetaminophen 500 MG tablet    TYLENOL     Take 1-2 tablets (500-1,000 mg) by mouth every 6 hours as needed for mild pain        aspirin 81 MG tablet      1 TABLET DAILY        calcium-vitamin D 600-400 MG-UNIT per tablet    CALTRATE    60 tablet    Take 1 tablet by mouth 2 times daily        dutasteride 0.5 MG capsule    AVODART    90 capsule    Take 1 capsule (0.5 mg) by mouth daily    BPH (benign prostatic hyperplasia)       fish oil-omega-3 fatty acids 1000 MG capsule      Take 2 g by mouth daily.        GLUCOSAMINE PO      Take by mouth 2 times daily        hydrocortisone 1 % cream    CORTAID    30 g    Apply sparingly to affected area twice daily as needed.    Tinea cruris       predniSONE 2.5 MG tablet    DELTASONE    120 tablet    June 29, 2018: 7.5mg Po daily x 1 month and then 5mg PO daily    Inflammatory arthritis       VITAMIN D PO      Take  by mouth.

## 2018-08-07 NOTE — PROGRESS NOTES
Jorge is an 85-year-old gentleman with prostate cancer treated with external beam radiation therapy. His PSA is stable at 0.18. He is having no difficulty voiding and will soon be discontinuing his Marie and only take Avodart daily.  Other past medical history: Rheumatoid arthritis, hyperplastic polyp on colonoscopies, dry eyes, abdominal hernia, hemorrhoids, IBS, macular changes of retina, mitral valve prolapse, high cholesterol, colonoscopies, vasectomy, eye surgeries, inguinal hernia repair, nonsmoker  Medications: Tylenol, low-dose aspirin, calcium/vitamin D, vitamin D, Avodart, fish oil, glucosamine, prednisone 5 mg daily  Allergies: None  Exam: Alert and oriented, normocephalic, normal respirations, neuro grossly intact. Normal vital signs. Normal sphincter tone, no rectal mass or impaction, small benign prostate, normal seminal vesicles  Assessment: Adenocarcinoma of the prostate-nice response with external beam radiation therapy  Plan: See me in 6 months with PSA

## 2018-08-11 DIAGNOSIS — R39.12 BENIGN PROSTATIC HYPERPLASIA WITH WEAK URINARY STREAM: Primary | ICD-10-CM

## 2018-08-11 DIAGNOSIS — N40.1 BENIGN PROSTATIC HYPERPLASIA WITH WEAK URINARY STREAM: Primary | ICD-10-CM

## 2018-08-11 RX ORDER — TAMSULOSIN HYDROCHLORIDE 0.4 MG/1
0.4 CAPSULE ORAL DAILY
Qty: 90 CAPSULE | Refills: 3 | Status: SHIPPED | OUTPATIENT
Start: 2018-08-11 | End: 2019-01-24

## 2018-08-31 NOTE — PROGRESS NOTES
[place] - Rheumatology Clinic Visit     Jorge Salomon MRN# [id number]   YOB: 1933    Primary care provider: Srinivasa Tello  Sep 7, 2018          Assessment and Plan:   #  Seronegative rheumatoid arthritis Dx 2018  # Low titer PHAN positive; H/o Sjogren syndrome (Dx decades ago)- sicca syndrome   # Carpal tunnel syndrome S/p right release 5/18   # Anemia- macrocytic  # Leukopenia   # Increased inflammatory markers  # Chronic NSAID use  # Brother had Chron's disease   # Prostate cancer - completed radiotherapy 2017     Dry eyes: uses refresh eye drops  Dry mouth - he hydrates himself with water    Uric acid < 5.   RF negative.     Creat , AST, ALT within normal limits 3/18  We discussed lab results today:  PHAN antibody which is a generic antibody is borderline positive.   MEGAN antibody panel negative.   CCP rheumatoid antibody is normal.     Xray hand: no joint erosions noted. 6/18    We discussed that patient has seronegative rheumatoid arthritis. Carpal tunnel syndrome is likely related to this. Very responsive to prednisone even at 10 mg of prednisone per day.     Anemia: Colonoscopy- last one was 3-4 years ago. Negative per patient.   Also watch for myelodysplastic syndrome.Seen by Dr. Whittington in [facility]. Patient reports that there were no concerns for any malignancy. No bone marrow biopsy was done.     Avoid anti-TNFs because of prostate cancer diagnosis in 2017.      RA disease activity:  Moderate on prednisone 5mg PO daily.     Discussed with the patient regarding benefits versus risks of plaquenil therapy.  Discussed that plaquenil can cause in some people - eye toxicity.   Needs baseline eye exam by ophthalmologist. Recommend periodic eye exams.   Discussed that it would take about 3-6 months to get maximum efficacy of plaquenil.     INCREASE Prednisone to 7.5mg PO daily. F/u in 6 weeks.     Recommend elemental calcium 1200mg PO daily and vitamin D 800 IU daily .    Wt Readings from Last  4 Encounters:   09/07/18 92.9 kg (204 lb 12.8 oz)   08/07/18 91.6 kg (202 lb)   06/29/18 91.9 kg (202 lb 11.2 oz)   05/15/18 92.2 kg (203 lb 3.2 oz)     The labs from patient records are reviewed.     I will be back in touch with the patient through mychart/letter when results are available.     Patient agrees with the above mentioned treatment plan.     Most Recent Immunizations   Administered Date(s) Administered     Influenza (IIV3) PF 10/25/2007     TD (ADULT, 7+) 02/17/2015     No orders of the defined types were placed in this encounter.      Return in about 6 weeks (around 10/19/2018).    Medications Discontinued During This Encounter   Medication Reason     predniSONE (DELTASONE) 2.5 MG tablet Reorder     Current Outpatient Prescriptions   Medication Sig Dispense Refill     acetaminophen (TYLENOL) 500 MG tablet Take 1-2 tablets (500-1,000 mg) by mouth every 6 hours as needed for mild pain       calcium-vitamin D (CALTRATE) 600-400 MG-UNIT per tablet Take 1 tablet by mouth 2 times daily 60 tablet      dutasteride (AVODART) 0.5 MG capsule Take 1 capsule (0.5 mg) by mouth daily 90 capsule 1     fish oil-omega-3 fatty acids (FISH OIL) 1000 MG capsule Take 2 g by mouth daily.       GLUCOSAMINE PO Take by mouth 2 times daily        hydroxychloroquine (PLAQUENIL) 200 MG tablet Take 1 tablet (200 mg) by mouth 2 times daily 60 tablet 1     predniSONE (DELTASONE) 2.5 MG tablet September 7, 2018: 7.5mg Po daily 120 tablet 2     tamsulosin (FLOMAX) 0.4 MG capsule Take 1 capsule (0.4 mg) by mouth daily Take 30 mins after a meal 90 capsule 3     ASPIRIN 81 MG OR TABS 1 TABLET DAILY       Cholecalciferol (VITAMIN D PO) Take  by mouth.       hydrocortisone (CORTAID) 1 % cream Apply sparingly to affected area twice daily as needed. 30 g 1       Kan Nelson MD  [facility]          Active Problem List:     Patient Active Problem List    Diagnosis Date Noted     Sjogren's syndrome (H) 04/19/2018      Priority: Medium     Patient reports his ophthalmologist diagnosed him with this 30-40 years ago        Carpal tunnel syndrome of right wrist 04/19/2018     Priority: Medium     Pelvic pain in male 05/31/2013     Priority: Medium     HYPERLIPIDEMIA LDL GOAL <100 10/31/2010     Priority: Medium     Prostate cancer (H) 06/19/2009     Priority: Medium     Macular puckering of retina      Priority: Medium     Irritable bowel syndrome 01/16/2003     Priority: Medium     Benign prostatic hyperplasia 01/16/2003     Priority: Medium     Problem list name updated by automated process. Provider to review and confirm       Benign neoplasm of colon 01/16/2003     Priority: Medium            History of Present Illness:     Chief Complaint   Patient presents with     RECHECK       May 14, 2018  Have you ever seen a rheumatologist No Who NA When NA  Joint pain history  Onset: pt states that he has body aches all over especially in hands and wrist, sx for about 2 months. Pt has elevated CRP and ESR  Involved joints: see above  Pain scale:  3.5/10     Wakes the patient from sleep : Yes  Morning stiffness:Yes for 30 minutes  Meds used:naproxen     Interim history  Since last visit:  1. Infections - No  2. New symptoms/medical problem - Yes/ had carpal tunnel surgery 2 weeks ago   3. Any side effects from Rheum medications -NA  3. ER visits/Hospitalizations/surgeries - Yes/ had carpal tunnel surgery   4. Last PCP visit: 1/12/17    Wt Readings from Last 4 Encounters:   09/07/18 92.9 kg (204 lb 12.8 oz)   08/07/18 91.6 kg (202 lb)   06/29/18 91.9 kg (202 lb 11.2 oz)   05/15/18 92.2 kg (203 lb 3.2 oz)       No h/o unintentional weight loss, fevers, rash, swollen glands  No h/o gout  No family or personal history of psoriasis, ulcerative colitis or chron's disease.   Patient denies any raynauds  No h/o persistent shortness of breath, cough, chest pain  No h/o persistent vomiting, diarrhea, abdominal pain  No h/o hematochezia, hematuria,  hemoptysis  No h/o seizures or strokes    June 29, 2018  Have you ever seen a rheumatologist yes Who you When 05/15/2018                                         Joint pain history, arthritis  Onset:   Involved joints: bilateral knees, ankles, shoulders and hands  Pain scale:  2/10   - 4 in the morning  Wakes the patient from sleep : No  Morning stiffness:Yes for 60 minutes  Meds used:Prednisone, Naproxen Sodium (not on both now)     Interim history  Since last visit:  1. Infections - No  2. New symptoms/medical problem - No  3. Any side effects from Rheum medications -None  3. ER visits/Hospitalizations/surgeries - No  4. Last PCP visit: 01/12/2017 September 7, 2018  Have you ever seen a rheumatologist Yes Who You When 6/29/18  Joint pain history  Onset: Bilateral knee, ankle, shoulder, and knee pain. Pt does state that the last two months he hasn't had much knee or ankle pain. He says hand pain is under control ever since starting the prednisone. Pt does note that the bilateral shoulder pain has gotten worse since the last visit.   Involved joints: See Above  Pain scale:  5/10     Wakes the patient from sleep : Yes-Very Bad Last Night, Woke up Ten Times Due to Pain. But did lot of yard work yesterday.    Morning stiffness:Yes for 120 minutes  Meds used: Prednisone and Tylenol prn.      Interim history  Since last visit:  1. Infections - No  2. New symptoms/medical problem - Pt states he believes he has been bruising easier.   3. Any side effects from Rheum medications -Bruising   3. ER visits/Hospitalizations/surgeries - No  4. Last PCP visit: 1/12/17      BP Readings from Last 3 Encounters:   09/07/18 110/62   06/29/18 106/68   05/15/18 122/70          Review of Systems:   Complete ROS negative except for symptoms mentioned in the HPI          Past Medical History:     Past Medical History:   Diagnosis Date     Benign neoplasm of colon 6/98, 3/05    Hyperplastic polyp on both procedures.     Dry eyes       "Hernia, abdominal      Hypertrophy (benign) of prostate     hx of episodic prostatits     Internal hemorrhoids      Irritable bowel syndrome     IBS     Macular puckering of retina      Mitral valve prolapse      Mumps      Other disorders of vitreous     Vitreo-macular traction syndrome, left eye     Pure hypercholesterolemia      Past Surgical History:   Procedure Laterality Date     COLONOSCOPY      one \"1/4 inch hyperplastic\" polyp.     COLONOSCOPY  3/2005    One hyperplastic polyp     CYSTOSCOPY       Full thickness macular hole, left eye       HERNIORRHAPHY INGUINAL  2014    Procedure: HERNIORRHAPHY INGUINAL;  Surgeon: Reji Engle MD;  Location: [facility] OR     Left eye cataract  2000     Left vitreoretinal surgery  08     REMOVAL OF SPERM DUCT(S)  1970     Right eye Cataract  2012     VASECTOMY              Social History:     Social History     Occupational History     Not on file.     Social History Main Topics     Smoking status: Never Smoker     Smokeless tobacco: Never Used     Alcohol use No     Drug use: No     Sexual activity: Not Currently          Family History:     Family History   Problem Relation Age of Onset     HEART DISEASE Mother       age 70. Had Heart Failure / inactive thyroid treatment cause cardiac failure     Thyroid Disease Mother      inactive thyroid, also \"2-3\" siblings     Prostate Cancer Father      Fatal Prostate CA,  age 80     Diabetes Brother      Born 1934.     HEART DISEASE Brother      Fatal MI, had diabetes.  age 82     HEART DISEASE Brother      Born 1917 (Jarrod). Has had pacemaker.     HEART DISEASE Sister      Born 192 (Kim), unspecified \"heart problems\"     Family History Negative Sister      Born 191 (Ayde).      Respiratory Brother       age 75, COPD/hip fracture/pneumonia          Allergies:     Allergies   Allergen Reactions     No Known Allergies           Medications:     Current Outpatient " "Prescriptions   Medication Sig Dispense Refill     acetaminophen (TYLENOL) 500 MG tablet Take 1-2 tablets (500-1,000 mg) by mouth every 6 hours as needed for mild pain       calcium-vitamin D (CALTRATE) 600-400 MG-UNIT per tablet Take 1 tablet by mouth 2 times daily 60 tablet      dutasteride (AVODART) 0.5 MG capsule Take 1 capsule (0.5 mg) by mouth daily 90 capsule 1     fish oil-omega-3 fatty acids (FISH OIL) 1000 MG capsule Take 2 g by mouth daily.       GLUCOSAMINE PO Take by mouth 2 times daily        hydroxychloroquine (PLAQUENIL) 200 MG tablet Take 1 tablet (200 mg) by mouth 2 times daily 60 tablet 1     predniSONE (DELTASONE) 2.5 MG tablet September 7, 2018: 7.5mg Po daily 120 tablet 2     tamsulosin (FLOMAX) 0.4 MG capsule Take 1 capsule (0.4 mg) by mouth daily Take 30 mins after a meal 90 capsule 3     ASPIRIN 81 MG OR TABS 1 TABLET DAILY       Cholecalciferol (VITAMIN D PO) Take  by mouth.       hydrocortisone (CORTAID) 1 % cream Apply sparingly to affected area twice daily as needed. 30 g 1            Physical Exam:   Blood pressure 110/62, pulse 86, temperature 98.5  F (36.9  C), temperature source Oral, resp. rate 18, height 1.803 m (5' 11\"), weight 92.9 kg (204 lb 12.8 oz), SpO2 96 %.  Wt Readings from Last 4 Encounters:   09/07/18 92.9 kg (204 lb 12.8 oz)   08/07/18 91.6 kg (202 lb)   06/29/18 91.9 kg (202 lb 11.2 oz)   05/15/18 92.2 kg (203 lb 3.2 oz)     Constitutional: well-developed, appearing stated age; cooperative  MS: Synovitis in bilateral wrists, 2nd and 3rd MCPs- RESOLVED but with remnant synovial thickening in MCPs and PIPs.. Right 2nd PIP and left 3rd PIPs synovitis RESOLVED. Flexion contracture in right 2nd PIP.    Skin: no rash in exposed areas  Psych: nl judgement, orientation, memory, affect.         Data:         Kan Nelson MD    [facility]    "

## 2018-09-06 ENCOUNTER — TELEPHONE (OUTPATIENT)
Dept: INTERNAL MEDICINE | Facility: CLINIC | Age: 83
End: 2018-09-06

## 2018-09-06 DIAGNOSIS — R06.81 WITNESSED APNEIC SPELLS: ICD-10-CM

## 2018-09-06 DIAGNOSIS — R06.83 SNORING: Primary | ICD-10-CM

## 2018-09-06 NOTE — TELEPHONE ENCOUNTER
Patient left  saying his family thinks he has Sleep Apnea and needs a Sleep Study. No other information provided other than he wants to know his next steps.    Last OV with PCP 1/12/17     Ph.[phone]

## 2018-09-06 NOTE — TELEPHONE ENCOUNTER
"Call back from patient. States he does snore so badly that his wife has moved out of the bedroom. Denies fatigue. States his family has noted that he does stop breathing when he sleeps and this has been going on for \"several years\".  "

## 2018-09-07 ENCOUNTER — OFFICE VISIT (OUTPATIENT)
Dept: RHEUMATOLOGY | Facility: CLINIC | Age: 83
End: 2018-09-07
Payer: COMMERCIAL

## 2018-09-07 VITALS
OXYGEN SATURATION: 96 % | HEIGHT: 71 IN | BODY MASS INDEX: 28.67 KG/M2 | WEIGHT: 204.8 LBS | HEART RATE: 86 BPM | RESPIRATION RATE: 18 BRPM | TEMPERATURE: 98.5 F | SYSTOLIC BLOOD PRESSURE: 110 MMHG | DIASTOLIC BLOOD PRESSURE: 62 MMHG

## 2018-09-07 DIAGNOSIS — M06.09 RHEUMATOID ARTHRITIS, SERONEGATIVE, MULTIPLE SITES (H): Primary | ICD-10-CM

## 2018-09-07 DIAGNOSIS — M19.90 INFLAMMATORY ARTHRITIS: ICD-10-CM

## 2018-09-07 PROCEDURE — 99213 OFFICE O/P EST LOW 20 MIN: CPT | Performed by: INTERNAL MEDICINE

## 2018-09-07 RX ORDER — HYDROXYCHLOROQUINE SULFATE 200 MG/1
200 TABLET, FILM COATED ORAL 2 TIMES DAILY
Qty: 60 TABLET | Refills: 1 | Status: SHIPPED | OUTPATIENT
Start: 2018-09-07 | End: 2018-10-19

## 2018-09-07 RX ORDER — PREDNISONE 2.5 MG/1
TABLET ORAL
Qty: 120 TABLET | Refills: 2 | COMMUNITY
Start: 2018-09-07 | End: 2018-10-19

## 2018-09-07 ASSESSMENT — ROUTINE ASSESSMENT OF PATIENT INDEX DATA (RAPID3)
TOTAL RAPID3 SCORE: 11
RAPID3 INTERPRETATION: MODERATE 6.1-12.0

## 2018-09-07 NOTE — PATIENT INSTRUCTIONS
Hydroxychloroquine Sulfate Oral tablet  What is this medicine?  HYDROXYCHLOROQUINE (lowell drox ee KLOR oh kwin) is used to treat rheumatoid arthritis and systemic lupus erythematosus. It is also used to treat malaria.  This medicine may be used for other purposes; ask your health care provider or pharmacist if you have questions.  What should I tell my health care provider before I take this medicine?  They need to know if you have any of these conditions:    alcoholism    anemia or other blood disorder    eye disease    glucose 6-phosphate dehydrogenase (G6PD) deficiency    liver disease    porphyria    psoriasis    an unusual or allergic reaction to chloroquine, hydroxychloroquine, other medicines, foods, dyes, or preservatives    pregnant or trying to get pregnant    breast-feeding  How should I use this medicine?  Take this medicine by mouth with a glass of water. Follow the directions on the prescription label. If this medicine upsets your stomach take it with food or milk. Take your doses at regular intervals. Do not take your medicine more often than directed.  Talk to your pediatrician regarding the use of this medicine in children. Special care may be needed.  Overdosage: If you think you have taken too much of this medicine contact a poison control center or emergency room at once.  NOTE: This medicine is only for you. Do not share this medicine with others.  What if I miss a dose?  If you miss a dose, take it as soon as you can. If it is almost time for your next dose, take only that dose. Do not take double or extra doses.  What may interact with this medicine?    antacids    botulinum toxins    digoxin    kaolin    penicillamine  This list may not describe all possible interactions. Give your health care provider a list of all the medicines, herbs, non-prescription drugs, or dietary supplements you use. Also tell them if you smoke, drink alcohol, or use illegal drugs. Some items may interact with your  medicine.  What should I watch for while using this medicine?  Visit your doctor or health care professional for regular check ups. Tell your doctor if your symptoms do not improve. Arthritis symptoms may take several weeks to improve. If you are taking this medicine for a long time, you will need important blood work done. You will also need to have your eyes checked as directed.  This medicine can make you more sensitive to the sun. Keep out of the sun. If you cannot avoid being in the sun, wear protective clothing and use sunscreen. Do not use sun lamps or tanning beds/booths.  Avoid antacids and kaolin containing products for 2 hours before and after taking a dose of this medicine.  What side effects may I notice from receiving this medicine?  Side effects that you should report to your doctor or health care professional as soon as possible:    allergic reactions like skin rash, itching or hives, swelling of the face, lips, or tongue    change in vision    fever, infection    hearing loss or ringing    muscle weakness, tremor, or numbness    redness, blistering, peeling or loosening of the skin, including inside the mouth    seizures    unusual bleeding or bruising    unusually weak or tired  Side effects that usually do not require medical attention (report to your doctor or health care professional if they continue or are bothersome):    change in coloration of the mouth or skin    dizziness    hair loss, lightening    headache    irritability, nervousness, nightmares    loss of appetite    stomach upset, diarrhea  This list may not describe all possible side effects. Call your doctor for medical advice about side effects. You may report side effects to FDA at 1-316-FDA-2515.  Where should I keep my medicine?  Keep out of the reach of children. In children, this medicine can cause overdose with small doses.  Store at room temperature between 15 and 30 degrees C (59 and 86 degrees F). Protect from moisture and  light. Throw away any unused medicine after the expiration date.  NOTE:This sheet is a summary. It may not cover all possible information. If you have questions about this medicine, talk to your doctor, pharmacist, or health care provider. Copyright  2016 Gold Standard

## 2018-09-07 NOTE — NURSING NOTE
"Chief Complaint   Patient presents with     RECHECK       Initial /62 (BP Location: Right arm, Patient Position: Chair, Cuff Size: Adult Large)  Pulse 86  Temp 98.5  F (36.9  C) (Oral)  Resp 18  Ht 1.803 m (5' 11\")  Wt 92.9 kg (204 lb 12.8 oz)  SpO2 96%  BMI 28.56 kg/m2 Estimated body mass index is 28.56 kg/(m^2) as calculated from the following:    Height as of this encounter: 1.803 m (5' 11\").    Weight as of this encounter: 92.9 kg (204 lb 12.8 oz).  Medication Reconciliation: complete    Have you ever seen a rheumatologist Yes Who You When 6/29/18  Joint pain history  Onset: Bilateral knee, ankle, shoulder, and knee pain. Pt does state that the last two months he hasn't had much knee or ankle pain. He says hand pain is under control ever since starting the prednisone. Pt does note that the bilateral shoulder pain has gotten worse since the last visit.   Involved joints: See Above  Pain scale:  5/10     Wakes the patient from sleep : Yes-Very Bad Last Night, Woke up Ten Times Due to Pain.   Morning stiffness:Yes for 120 minutes  Meds used: Prednisone and Tylenol prn.     Interim history  Since last visit:  1. Infections - No  2. New symptoms/medical problem - Pt states he believes he has been bruising easier.   3. Any side effects from Rheum medications -Bruising   3. ER visits/Hospitalizations/surgeries - No  4. Last PCP visit: 1/12/17  Wt Readings from Last 4 Encounters:   09/07/18 92.9 kg (204 lb 12.8 oz)   08/07/18 91.6 kg (202 lb)   06/29/18 91.9 kg (202 lb 11.2 oz)   05/15/18 92.2 kg (203 lb 3.2 oz)     BP Readings from Last 3 Encounters:   09/07/18 110/62   06/29/18 106/68   05/15/18 122/70     Kosta Maxwell CMA ([facility])   "

## 2018-09-07 NOTE — MR AVS SNAPSHOT
After Visit Summary   9/7/2018    Jorge Salomon    MRN: [id number]           Patient Information     Date Of Birth          3/31/1933        Visit Information        Provider Department      9/7/2018 1:30 PM Kan Nelson MD [facility] [place]        Today's Diagnoses     Rheumatoid arthritis, seronegative, multiple sites (H)    -  1    Inflammatory arthritis          Care Instructions      Hydroxychloroquine Sulfate Oral tablet  What is this medicine?  HYDROXYCHLOROQUINE (lowell drox ee KLOR oh kwin) is used to treat rheumatoid arthritis and systemic lupus erythematosus. It is also used to treat malaria.  This medicine may be used for other purposes; ask your health care provider or pharmacist if you have questions.  What should I tell my health care provider before I take this medicine?  They need to know if you have any of these conditions:    alcoholism    anemia or other blood disorder    eye disease    glucose 6-phosphate dehydrogenase (G6PD) deficiency    liver disease    porphyria    psoriasis    an unusual or allergic reaction to chloroquine, hydroxychloroquine, other medicines, foods, dyes, or preservatives    pregnant or trying to get pregnant    breast-feeding  How should I use this medicine?  Take this medicine by mouth with a glass of water. Follow the directions on the prescription label. If this medicine upsets your stomach take it with food or milk. Take your doses at regular intervals. Do not take your medicine more often than directed.  Talk to your pediatrician regarding the use of this medicine in children. Special care may be needed.  Overdosage: If you think you have taken too much of this medicine contact a poison control center or emergency room at once.  NOTE: This medicine is only for you. Do not share this medicine with others.  What if I miss a dose?  If you miss a dose, take it as soon as you can. If it is almost time for your next dose, take only that  dose. Do not take double or extra doses.  What may interact with this medicine?    antacids    botulinum toxins    digoxin    kaolin    penicillamine  This list may not describe all possible interactions. Give your health care provider a list of all the medicines, herbs, non-prescription drugs, or dietary supplements you use. Also tell them if you smoke, drink alcohol, or use illegal drugs. Some items may interact with your medicine.  What should I watch for while using this medicine?  Visit your doctor or health care professional for regular check ups. Tell your doctor if your symptoms do not improve. Arthritis symptoms may take several weeks to improve. If you are taking this medicine for a long time, you will need important blood work done. You will also need to have your eyes checked as directed.  This medicine can make you more sensitive to the sun. Keep out of the sun. If you cannot avoid being in the sun, wear protective clothing and use sunscreen. Do not use sun lamps or tanning beds/booths.  Avoid antacids and kaolin containing products for 2 hours before and after taking a dose of this medicine.  What side effects may I notice from receiving this medicine?  Side effects that you should report to your doctor or health care professional as soon as possible:    allergic reactions like skin rash, itching or hives, swelling of the face, lips, or tongue    change in vision    fever, infection    hearing loss or ringing    muscle weakness, tremor, or numbness    redness, blistering, peeling or loosening of the skin, including inside the mouth    seizures    unusual bleeding or bruising    unusually weak or tired  Side effects that usually do not require medical attention (report to your doctor or health care professional if they continue or are bothersome):    change in coloration of the mouth or skin    dizziness    hair loss, lightening    headache    irritability, nervousness, nightmares    loss of  appetite    stomach upset, diarrhea  This list may not describe all possible side effects. Call your doctor for medical advice about side effects. You may report side effects to FDA at 8-765-FOQ-1592.  Where should I keep my medicine?  Keep out of the reach of children. In children, this medicine can cause overdose with small doses.  Store at room temperature between 15 and 30 degrees C (59 and 86 degrees F). Protect from moisture and light. Throw away any unused medicine after the expiration date.  NOTE:This sheet is a summary. It may not cover all possible information. If you have questions about this medicine, talk to your doctor, pharmacist, or health care provider. Copyright  2016 Gold Standard                Follow-ups after your visit        Follow-up notes from your care team     Return in about 6 weeks (around 10/19/2018).      Your next 10 appointments already scheduled     Feb 05, 2019 10:30 AM CST   Return Visit with Marek Miles MD   [facility] ([facility])    [address]  [address]  [facility] [address] [phone]              Who to contact     If you have questions or need follow up information about today's clinic visit or your schedule please contact [facility] directly at [phone].  Normal or non-critical lab and imaging results will be communicated to you by [employer]hart, letter or phone within 4 business days after the clinic has received the results. If you do not hear from us within 7 days, please contact the clinic through [employer]hart or phone. If you have a critical or abnormal lab result, we will notify you by phone as soon as possible.  Submit refill requests through Sendmail or call your pharmacy and they will forward the refill request to us. Please allow 3 business days for your refill to be completed.          Additional Information About Your Visit        [email or web address] Information     [employer] gives  "you secure access to your electronic health record. If you see a primary care provider, you can also send messages to your care team and make appointments. If you have questions, please call your primary care clinic.  If you do not have a primary care provider, please call [phone] and they will assist you.        Care EveryWhere ID     This is your Care EveryWhere ID. This could be used by other organizations to access your [place] medical records  [id number]        Your Vitals Were     Pulse Temperature Respirations Height Pulse Oximetry BMI (Body Mass Index)    86 98.5  F (36.9  C) (Oral) 18 1.803 m (5' 11\") 96% 28.56 kg/m2       Blood Pressure from Last 3 Encounters:   09/07/18 110/62   06/29/18 106/68   05/15/18 122/70    Weight from Last 3 Encounters:   09/07/18 92.9 kg (204 lb 12.8 oz)   08/07/18 91.6 kg (202 lb)   06/29/18 91.9 kg (202 lb 11.2 oz)              Today, you had the following     No orders found for display         Today's Medication Changes          These changes are accurate as of 9/7/18  2:27 PM.  If you have any questions, ask your nurse or doctor.               Start taking these medicines.        Dose/Directions    hydroxychloroquine 200 MG tablet   Commonly known as:  PLAQUENIL   Used for:  Rheumatoid arthritis, seronegative, multiple sites (H)   Started by:  Kan Nelson MD        Dose:  200 mg   Take 1 tablet (200 mg) by mouth 2 times daily   Quantity:  60 tablet   Refills:  1         These medicines have changed or have updated prescriptions.        Dose/Directions    predniSONE 2.5 MG tablet   Commonly known as:  DELTASONE   This may have changed:  additional instructions   Used for:  Inflammatory arthritis   Changed by:  Kan Nelson MD        September 7, 2018: 7.5mg Po daily   Quantity:  120 tablet   Refills:  2            Where to get your medicines      These medications were sent to [address]"  [address] [place] [address]     Phone:  [phone]     hydroxychloroquine 200 MG tablet                Primary Care Provider Office Phone # Fax #    Srinivasa Tello [phone]       [address]  [facility] [address]        Equal Access to Services     TRIPP BUTLER : Hadii aad ku hadasho Soomaali, waaxda luqadaha, qaybta kaalmada adeegyada, waxay ricardoin hayaan ademorena conteh laheather . So [facility] [phone].    ATENCIÓN: Si habla español, tiene a ojeda disposición servicios gratuitos de asistencia lingüística. [facility] [phone].    We comply with applicable federal civil rights laws and [place] laws. We do not discriminate on the basis of race, color, national origin, age, disability, sex, sexual orientation, or gender identity.            Thank you!     Thank you for choosing [facility]  for your care. Our goal is always to provide you with excellent care. Hearing back from our patients is one way we can continue to improve our services. Please take a few minutes to complete the written survey that you may receive in the mail after your visit with us. Thank you!             Your Updated Medication List - Protect others around you: Learn how to safely use, store and throw away your medicines at www.disposemymeds.org.          This list is accurate as of 9/7/18  2:27 PM.  Always use your most recent med list.                   Brand Name Dispense Instructions for use Diagnosis    acetaminophen 500 MG tablet    TYLENOL     Take 1-2 tablets (500-1,000 mg) by mouth every 6 hours as needed for mild pain        aspirin 81 MG tablet      1 TABLET DAILY        calcium carbonate 600 mg-vitamin D 400 units 600-400 MG-UNIT per tablet    CALTRATE    60 tablet    Take 1 tablet by mouth 2 times daily        dutasteride 0.5 MG capsule    AVODART    90 capsule    Take 1 capsule (0.5 mg) by mouth daily    BPH (benign prostatic hyperplasia)       fish oil-omega-3 fatty acids  1000 MG capsule      Take 2 g by mouth daily.        GLUCOSAMINE PO      Take by mouth 2 times daily        hydrocortisone 1 % cream    CORTAID    30 g    Apply sparingly to affected area twice daily as needed.    Tinea cruris       hydroxychloroquine 200 MG tablet    PLAQUENIL    60 tablet    Take 1 tablet (200 mg) by mouth 2 times daily    Rheumatoid arthritis, seronegative, multiple sites (H)       predniSONE 2.5 MG tablet    DELTASONE    120 tablet    September 7, 2018: 7.5mg Po daily    Inflammatory arthritis       tamsulosin 0.4 MG capsule    FLOMAX    90 capsule    Take 1 capsule (0.4 mg) by mouth daily Take 30 mins after a meal    Benign prostatic hyperplasia with weak urinary stream       VITAMIN D PO      Take  by mouth.

## 2018-09-12 NOTE — TELEPHONE ENCOUNTER
Sleep consult ordered. Please provide patient with phone number for [facility] in [place].

## 2018-09-13 NOTE — TELEPHONE ENCOUNTER
Patient informed referral placed to [facility]. Phone number provided. Patient advised to check with insurance to confirm they are covered and call back if they are not. Patient verbalizes understanding.

## 2018-09-20 ENCOUNTER — TRANSFERRED RECORDS (OUTPATIENT)
Dept: HEALTH INFORMATION MANAGEMENT | Facility: CLINIC | Age: 83
End: 2018-09-20

## 2018-10-05 ENCOUNTER — TRANSFERRED RECORDS (OUTPATIENT)
Dept: HEALTH INFORMATION MANAGEMENT | Facility: CLINIC | Age: 83
End: 2018-10-05

## 2018-10-17 NOTE — PROGRESS NOTES
[facility] - Rheumatology Clinic Visit     Jorge Salomon MRN# [id number]   YOB: 1933    Primary care provider: Srinivasa Tello  Oct 19, 2018          Assessment and Plan:   #  Seronegative rheumatoid arthritis Dx 2018  # Low titer PHAN positive; H/o Sjogren syndrome (Dx decades ago)- sicca syndrome   # Carpal tunnel syndrome S/p right release 5/18   # Anemia- macrocytic  # Leukopenia   # Increased inflammatory markers  # Chronic NSAID use  # Brother had Chron's disease   # Prostate cancer - completed radiotherapy 2017     Dry eyes: uses refresh eye drops  Dry mouth - he hydrates himself with water    Uric acid < 5.   RF negative.     Creat , AST, ALT within normal limits 3/18  We discussed lab results today:  PHAN antibody which is a generic antibody is borderline positive.   MEGAN antibody panel negative.   CCP rheumatoid antibody is normal.     Xray hand: no joint erosions noted. 6/18    We discussed that patient has seronegative rheumatoid arthritis. Carpal tunnel syndrome is likely related to this. Very responsive to prednisone even at 10 mg of prednisone per day.     Anemia: Colonoscopy- last one was 3-4 years ago. Negative per patient.   Also watch for myelodysplastic syndrome.Seen by Dr. Whittington in [facility]. Patient reports that there were no concerns for any malignancy. No bone marrow biopsy was done.     Avoid anti-TNFs because of prostate cancer diagnosis in 2017.      RA disease activity:  low on prednisone 5mg PO daily and plaquenil (started 2018). Continue same dose prednisone for 4 more weeks and then 2.5mg PO daily X 4 weeks and then stop. Continue plaquenil 200mg PO BID.     Recommend elemental calcium 1200mg PO daily and vitamin D 800 IU daily .    Wt Readings from Last 4 Encounters:   10/19/18 91.7 kg (202 lb 1.6 oz)   09/07/18 92.9 kg (204 lb 12.8 oz)   08/07/18 91.6 kg (202 lb)   06/29/18 91.9 kg (202 lb 11.2 oz)     The labs from patient records are reviewed.     I will be back in  touch with the patient through mychart/letter when results are available.     Patient agrees with the above mentioned treatment plan.     Most Recent Immunizations   Administered Date(s) Administered     Influenza (IIV3) PF 10/25/2007     TD (ADULT, 7+) 02/17/2015     No orders of the defined types were placed in this encounter.      Return in about 3 months (around 1/19/2019).    Medications Discontinued During This Encounter   Medication Reason     predniSONE (DELTASONE) 2.5 MG tablet Reorder     hydroxychloroquine (PLAQUENIL) 200 MG tablet Reorder     Current Outpatient Prescriptions   Medication Sig Dispense Refill     calcium-vitamin D (CALTRATE) 600-400 MG-UNIT per tablet Take 1 tablet by mouth daily  60 tablet      Cholecalciferol (VITAMIN D PO) Take  by mouth.       dutasteride (AVODART) 0.5 MG capsule Take 1 capsule (0.5 mg) by mouth daily 90 capsule 1     fish oil-omega-3 fatty acids (FISH OIL) 1000 MG capsule Take 2 g by mouth daily.       GLUCOSAMINE PO Take by mouth 2 times daily        hydroxychloroquine (PLAQUENIL) 200 MG tablet Take 1 tablet (200 mg) by mouth 2 times daily 180 tablet 1     predniSONE (DELTASONE) 2.5 MG tablet October 19, 2018: take 5 mg PO daily X 4 weeks and then 2.5 mg PO daily X 4 weeks and then stop 90 tablet 1     tamsulosin (FLOMAX) 0.4 MG capsule Take 1 capsule (0.4 mg) by mouth daily Take 30 mins after a meal 90 capsule 3     acetaminophen (TYLENOL) 500 MG tablet Take 1-2 tablets (500-1,000 mg) by mouth every 6 hours as needed for mild pain       ASPIRIN 81 MG OR TABS 1 TABLET DAILY       hydrocortisone (CORTAID) 1 % cream Apply sparingly to affected area twice daily as needed. (Patient not taking: Reported on 10/19/2018) 30 g 1       Kan Nelson MD  [facility]          Active Problem List:     Patient Active Problem List    Diagnosis Date Noted     Sjogren's syndrome (H) 04/19/2018     Priority: Medium     Patient reports his ophthalmologist  diagnosed him with this 30-40 years ago        Carpal tunnel syndrome of right wrist 04/19/2018     Priority: Medium     Pelvic pain in male 05/31/2013     Priority: Medium     HYPERLIPIDEMIA LDL GOAL <100 10/31/2010     Priority: Medium     Prostate cancer (H) 06/19/2009     Priority: Medium     Macular puckering of retina      Priority: Medium     Irritable bowel syndrome 01/16/2003     Priority: Medium     Benign prostatic hyperplasia 01/16/2003     Priority: Medium     Problem list name updated by automated process. Provider to review and confirm       Benign neoplasm of colon 01/16/2003     Priority: Medium            History of Present Illness:     Chief Complaint   Patient presents with     RECHECK       May 14, 2018  Have you ever seen a rheumatologist No Who NA When NA  Joint pain history  Onset: pt states that he has body aches all over especially in hands and wrist, sx for about 2 months. Pt has elevated CRP and ESR  Involved joints: see above  Pain scale:  3.5/10     Wakes the patient from sleep : Yes  Morning stiffness:Yes for 30 minutes  Meds used:naproxen     Interim history  Since last visit:  1. Infections - No  2. New symptoms/medical problem - Yes/ had carpal tunnel surgery 2 weeks ago   3. Any side effects from Rheum medications -NA  3. ER visits/Hospitalizations/surgeries - Yes/ had carpal tunnel surgery   4. Last PCP visit: 1/12/17    Wt Readings from Last 4 Encounters:   10/19/18 91.7 kg (202 lb 1.6 oz)   09/07/18 92.9 kg (204 lb 12.8 oz)   08/07/18 91.6 kg (202 lb)   06/29/18 91.9 kg (202 lb 11.2 oz)       No h/o unintentional weight loss, fevers, rash, swollen glands  No h/o gout  No family or personal history of psoriasis, ulcerative colitis or chron's disease.   Patient denies any raynauds  No h/o persistent shortness of breath, cough, chest pain  No h/o persistent vomiting, diarrhea, abdominal pain  No h/o hematochezia, hematuria, hemoptysis  No h/o seizures or strokes    June 29,  2018  Have you ever seen a rheumatologist yes Who you When 05/15/2018                                         Joint pain history, arthritis  Onset:   Involved joints: bilateral knees, ankles, shoulders and hands  Pain scale:  2/10   - 4 in the morning  Wakes the patient from sleep : No  Morning stiffness:Yes for 60 minutes  Meds used:Prednisone, Naproxen Sodium (not on both now)     Interim history  Since last visit:  1. Infections - No  2. New symptoms/medical problem - No  3. Any side effects from Rheum medications -None  3. ER visits/Hospitalizations/surgeries - No  4. Last PCP visit: 01/12/2017 September 7, 2018  Have you ever seen a rheumatologist Yes Who You When 6/29/18  Joint pain history  Onset: Bilateral knee, ankle, shoulder, and knee pain. Pt does state that the last two months he hasn't had much knee or ankle pain. He says hand pain is under control ever since starting the prednisone. Pt does note that the bilateral shoulder pain has gotten worse since the last visit.   Involved joints: See Above  Pain scale:  5/10     Wakes the patient from sleep : Yes-Very Bad Last Night, Woke up Ten Times Due to Pain. But did lot of yard work yesterday.    Morning stiffness:Yes for 120 minutes  Meds used: Prednisone and Tylenol prn.      Interim history  Since last visit:  1. Infections - No  2. New symptoms/medical problem - Pt states he believes he has been bruising easier.   3. Any side effects from Rheum medications -Bruising   3. ER visits/Hospitalizations/surgeries - No  4. Last PCP visit: 1/12/17 October 19, 2018  Have you ever seen a rheumatologist Yes Who You  When 9/7/18  Joint pain history  Onset:6-8  Months onset  Involved joints: Bilateral shoulder pain especially the right, bilateral hands- right hand still a little numb from carpal surgery 6 months ago, experiences stiffness in left hand every once and a while 1/10 on stiffness, knees are sore-little 1 day out of the week the same with the ankles  "  Pain scale:  1/10     Wakes the patient from sleep : Yes hard to say from pain diagnosed with sleep apnea  Morning stiffness:Yes for 30 minutes  Meds used:Plaquenil and the prednisone      Interim history  Since last visit:  1. Infections - No  2. New symptoms/medical problem - Yes- sleep apnea  3. Any side effects from Rheum medications -may get a little dizzy from time to time could be medication related, usually happens in the morning when he gets up   3. ER visits/Hospitalizations/surgeries - No  4. Last PCP visit: 3/22/18      BP Readings from Last 3 Encounters:   10/19/18 120/72   09/07/18 110/62   06/29/18 106/68          Review of Systems:   Complete ROS negative except for symptoms mentioned in the HPI          Past Medical History:     Past Medical History:   Diagnosis Date     Benign neoplasm of colon 6/98, 3/05    Hyperplastic polyp on both procedures.     Dry eyes      Hernia, abdominal      Hypertrophy (benign) of prostate     hx of episodic prostatits     Internal hemorrhoids      Irritable bowel syndrome     IBS     Macular puckering of retina      Mitral valve prolapse      Mumps      Other disorders of vitreous     Vitreo-macular traction syndrome, left eye     Pure hypercholesterolemia      Past Surgical History:   Procedure Laterality Date     COLONOSCOPY  6/98    one \"1/4 inch hyperplastic\" polyp.     COLONOSCOPY  3/2005    One hyperplastic polyp     CYSTOSCOPY       Full thickness macular hole, left eye  02/9/20008     HERNIORRHAPHY INGUINAL  7/9/2014    Procedure: HERNIORRHAPHY INGUINAL;  Surgeon: Reji Engle MD;  Location: [facility] OR     Left eye cataract  01/01/2000     Left vitreoretinal surgery  2/8/08     REMOVAL OF SPERM DUCT(S)  1970     Right eye Cataract  11/2012     VASECTOMY              Social History:     Social History     Occupational History     Not on file.     Social History Main Topics     Smoking status: Never Smoker     Smokeless tobacco: Never Used     Alcohol " "use No     Drug use: No     Sexual activity: Not Currently          Family History:     Family History   Problem Relation Age of Onset     HEART DISEASE Mother       age 70. Had Heart Failure / inactive thyroid treatment cause cardiac failure     Thyroid Disease Mother      inactive thyroid, also \"2-3\" siblings     Prostate Cancer Father      Fatal Prostate CA,  age 80     Diabetes Brother      Born 1934.     HEART DISEASE Brother      Fatal MI, had diabetes.  age 82     HEART DISEASE Brother      Born 1917 (Jarrod). Has had pacemaker.     HEART DISEASE Sister      Born 1924 (Kim), unspecified \"heart problems\"     Family History Negative Sister      Born 191 (Ayde).      Respiratory Brother       age 75, COPD/hip fracture/pneumonia          Allergies:     Allergies   Allergen Reactions     No Known Allergies           Medications:     Current Outpatient Prescriptions   Medication Sig Dispense Refill     calcium-vitamin D (CALTRATE) 600-400 MG-UNIT per tablet Take 1 tablet by mouth daily  60 tablet      Cholecalciferol (VITAMIN D PO) Take  by mouth.       dutasteride (AVODART) 0.5 MG capsule Take 1 capsule (0.5 mg) by mouth daily 90 capsule 1     fish oil-omega-3 fatty acids (FISH OIL) 1000 MG capsule Take 2 g by mouth daily.       GLUCOSAMINE PO Take by mouth 2 times daily        hydroxychloroquine (PLAQUENIL) 200 MG tablet Take 1 tablet (200 mg) by mouth 2 times daily 180 tablet 1     predniSONE (DELTASONE) 2.5 MG tablet 2018: take 5 mg PO daily X 4 weeks and then 2.5 mg PO daily X 4 weeks and then stop 90 tablet 1     tamsulosin (FLOMAX) 0.4 MG capsule Take 1 capsule (0.4 mg) by mouth daily Take 30 mins after a meal 90 capsule 3     acetaminophen (TYLENOL) 500 MG tablet Take 1-2 tablets (500-1,000 mg) by mouth every 6 hours as needed for mild pain       ASPIRIN 81 MG OR TABS 1 TABLET DAILY       hydrocortisone (CORTAID) 1 % cream Apply sparingly to affected area twice daily as " "needed. (Patient not taking: Reported on 10/19/2018) 30 g 1            Physical Exam:   Blood pressure 120/72, pulse 97, temperature 98.5  F (36.9  C), temperature source Oral, resp. rate 20, height 1.83 m (6' 0.05\"), weight 91.7 kg (202 lb 1.6 oz), SpO2 94 %.  Wt Readings from Last 4 Encounters:   10/19/18 91.7 kg (202 lb 1.6 oz)   09/07/18 92.9 kg (204 lb 12.8 oz)   08/07/18 91.6 kg (202 lb)   06/29/18 91.9 kg (202 lb 11.2 oz)     Constitutional: well-developed, appearing stated age; cooperative  MS: Synovitis in bilateral wrists, 2nd and 3rd MCPs- RESOLVED. Right 2nd PIP and left 3rd PIPs synovitis RESOLVED. Flexion contracture in right 2nd PIP- this is improving  Fist 100% bilateral.   Skin: no rash in exposed areas  Psych: nl judgement, orientation, memory, affect.         Data:         Kan Nelson MD    [facility]    "

## 2018-10-19 ENCOUNTER — OFFICE VISIT (OUTPATIENT)
Dept: RHEUMATOLOGY | Facility: CLINIC | Age: 83
End: 2018-10-19
Payer: COMMERCIAL

## 2018-10-19 VITALS
DIASTOLIC BLOOD PRESSURE: 72 MMHG | OXYGEN SATURATION: 94 % | BODY MASS INDEX: 27.37 KG/M2 | WEIGHT: 202.1 LBS | HEIGHT: 72 IN | HEART RATE: 97 BPM | SYSTOLIC BLOOD PRESSURE: 120 MMHG | RESPIRATION RATE: 20 BRPM | TEMPERATURE: 98.5 F

## 2018-10-19 DIAGNOSIS — M06.09 RHEUMATOID ARTHRITIS, SERONEGATIVE, MULTIPLE SITES (H): ICD-10-CM

## 2018-10-19 DIAGNOSIS — M19.90 INFLAMMATORY ARTHRITIS: ICD-10-CM

## 2018-10-19 PROCEDURE — 99213 OFFICE O/P EST LOW 20 MIN: CPT | Performed by: INTERNAL MEDICINE

## 2018-10-19 RX ORDER — PREDNISONE 2.5 MG/1
TABLET ORAL
Qty: 90 TABLET | Refills: 1 | Status: SHIPPED | OUTPATIENT
Start: 2018-10-19 | End: 2019-01-28

## 2018-10-19 RX ORDER — HYDROXYCHLOROQUINE SULFATE 200 MG/1
200 TABLET, FILM COATED ORAL 2 TIMES DAILY
Qty: 180 TABLET | Refills: 1 | Status: SHIPPED | OUTPATIENT
Start: 2018-10-19 | End: 2019-02-15

## 2018-10-19 ASSESSMENT — ROUTINE ASSESSMENT OF PATIENT INDEX DATA (RAPID3)
TOTAL RAPID3 SCORE: 4.7
RAPID3 INTERPRETATION: LOW 3.1-6

## 2018-10-19 NOTE — NURSING NOTE
"Chief Complaint   Patient presents with     RECHECK       Initial /72 (BP Location: Left arm, Patient Position: Chair, Cuff Size: Adult Regular)  Pulse 97  Temp 98.5  F (36.9  C) (Oral)  Resp 20  Ht 1.83 m (6' 0.05\")  Wt 91.7 kg (202 lb 1.6 oz)  SpO2 94%  BMI 27.37 kg/m2 Estimated body mass index is 27.37 kg/(m^2) as calculated from the following:    Height as of this encounter: 1.83 m (6' 0.05\").    Weight as of this encounter: 91.7 kg (202 lb 1.6 oz).  Medication Reconciliation: complete    Have you ever seen a rheumatologist Yes Who You  When 9/7/18  Joint pain history  Onset:6-8  Months onset  Involved joints: Bilateral shoulder pain especially the right, bilateral hands- right hand still a little numb from carpal surgery 6 months ago, experiences stiffness in left hand every once and a while 1/10 on stiffness, knees are sore-little 1 day out of the week the same with the ankles   Pain scale:  1/10     Wakes the patient from sleep : Yes hard to say from pain diagnosed with sleep apnea  Morning stiffness:Yes for 30 minutes  Meds used:Plaquenil and the prednisone     Interim history  Since last visit:  1. Infections - No  2. New symptoms/medical problem - Yes- sleep apnea  3. Any side effects from Rheum medications -may get a little dizzy from time to time could be medication related, usually happens in the morning when he gets up   3. ER visits/Hospitalizations/surgeries - No  4. Last PCP visit: 3/22/18  Wt Readings from Last 4 Encounters:   10/19/18 91.7 kg (202 lb 1.6 oz)   09/07/18 92.9 kg (204 lb 12.8 oz)   08/07/18 91.6 kg (202 lb)   06/29/18 91.9 kg (202 lb 11.2 oz)     BP Readings from Last 3 Encounters:   10/19/18 120/72   09/07/18 110/62   06/29/18 106/68       "

## 2018-10-19 NOTE — MR AVS SNAPSHOT
After Visit Summary   10/19/2018    Jorge Salomon    MRN: [id number]           Patient Information     Date Of Birth          3/31/1933        Visit Information        Provider Department      10/19/2018 2:00 PM Kan Nelson MD [facility]        Today's Diagnoses     Inflammatory arthritis           Follow-ups after your visit        Follow-up notes from your care team     Return in about 3 months (around 1/19/2019).      Your next 10 appointments already scheduled     Feb 05, 2019 10:30 AM CST   Return Visit with Marek Miles MD   [facility] ([facility])    [address]  [address]  [facility] [address] [phone]              Who to contact     If you have questions or need follow up information about today's clinic visit or your schedule please contact [facility] directly at [phone].  Normal or non-critical lab and imaging results will be communicated to you by MyChart, letter or phone within 4 business days after the clinic has received the results. If you do not hear from us within 7 days, please contact the clinic through [employer] or phone. If you have a critical or abnormal lab result, we will notify you by phone as soon as possible.  Submit refill requests through [employer] or call your pharmacy and they will forward the refill request to us. Please allow 3 business days for your refill to be completed.          Additional Information About Your Visit        MyChart Information     [employer] gives you secure access to your electronic health record. If you see a primary care provider, you can also send messages to your care team and make appointments. If you have questions, please call your primary care clinic.  If you do not have a primary care provider, please call [phone] and they will assist you.        Care EveryWhere ID     This is your Care EveryWhere ID.  "This could be used by other organizations to access your [place] medical records  [id number]        Your Vitals Were     Pulse Temperature Respirations Height Pulse Oximetry BMI (Body Mass Index)    97 98.5  F (36.9  C) (Oral) 20 1.83 m (6' 0.05\") 94% 27.37 kg/m2       Blood Pressure from Last 3 Encounters:   10/19/18 120/72   09/07/18 110/62   06/29/18 106/68    Weight from Last 3 Encounters:   10/19/18 91.7 kg (202 lb 1.6 oz)   09/07/18 92.9 kg (204 lb 12.8 oz)   08/07/18 91.6 kg (202 lb)              Today, you had the following     No orders found for display         Today's Medication Changes          These changes are accurate as of 10/19/18  2:01 PM.  If you have any questions, ask your nurse or doctor.               These medicines have changed or have updated prescriptions.        Dose/Directions    predniSONE 2.5 MG tablet   Commonly known as:  DELTASONE   This may have changed:  additional instructions   Used for:  Inflammatory arthritis   Changed by:  Kan Nelson MD        October 19, 2018: take 5 mg PO daily X 4 weeks and then 2.5 mg PO daily X 4 weeks and then stop   Quantity:  90 tablet   Refills:  1            Where to get your medicines      These medications were sent to [facility] [address] [place] [address]     Phone:  [phone]     predniSONE 2.5 MG tablet                Primary Care Provider Office Phone # Fax #    Srinivasa Tello [phone]       [address]  [facility] [address]        Equal Access to Services     [facility] : Hadii luan calderon Sochris, waaxda luqadaha, qaybta kaalmada alva, skye marc. So [facility] [phone].    ATENCIÓN: Si habla español, tiene a ojeda disposición servicios gratuitos de asistencia lingüística. Llame al [phone].    We comply with applicable federal civil rights laws and [place] laws. We do not " discriminate on the basis of race, color, national origin, age, disability, sex, sexual orientation, or gender identity.            Thank you!     Thank you for choosing [facility]  for your care. Our goal is always to provide you with excellent care. Hearing back from our patients is one way we can continue to improve our services. Please take a few minutes to complete the written survey that you may receive in the mail after your visit with us. Thank you!             Your Updated Medication List - Protect others around you: Learn how to safely use, store and throw away your medicines at www.disposemymeds.org.          This list is accurate as of 10/19/18  2:01 PM.  Always use your most recent med list.                   Brand Name Dispense Instructions for use Diagnosis    acetaminophen 500 MG tablet    TYLENOL     Take 1-2 tablets (500-1,000 mg) by mouth every 6 hours as needed for mild pain        aspirin 81 MG tablet      1 TABLET DAILY        calcium carbonate 600 mg-vitamin D 400 units 600-400 MG-UNIT per tablet    CALTRATE    60 tablet    Take 1 tablet by mouth daily        dutasteride 0.5 MG capsule    AVODART    90 capsule    Take 1 capsule (0.5 mg) by mouth daily    BPH (benign prostatic hyperplasia)       fish oil-omega-3 fatty acids 1000 MG capsule      Take 2 g by mouth daily.        GLUCOSAMINE PO      Take by mouth 2 times daily        hydrocortisone 1 % cream    CORTAID    30 g    Apply sparingly to affected area twice daily as needed.    Tinea cruris       hydroxychloroquine 200 MG tablet    PLAQUENIL    60 tablet    Take 1 tablet (200 mg) by mouth 2 times daily    Rheumatoid arthritis, seronegative, multiple sites (H)       predniSONE 2.5 MG tablet    DELTASONE    90 tablet    October 19, 2018: take 5 mg PO daily X 4 weeks and then 2.5 mg PO daily X 4 weeks and then stop    Inflammatory arthritis       tamsulosin 0.4 MG capsule    FLOMAX    90 capsule    Take 1 capsule (0.4 mg) by  mouth daily Take 30 mins after a meal    Benign prostatic hyperplasia with weak urinary stream       VITAMIN D PO      Take  by mouth.

## 2018-10-22 ENCOUNTER — TRANSFERRED RECORDS (OUTPATIENT)
Dept: HEALTH INFORMATION MANAGEMENT | Facility: CLINIC | Age: 83
End: 2018-10-22

## 2018-11-07 ENCOUNTER — TRANSFERRED RECORDS (OUTPATIENT)
Dept: HEALTH INFORMATION MANAGEMENT | Facility: CLINIC | Age: 83
End: 2018-11-07

## 2018-11-15 ENCOUNTER — TRANSFERRED RECORDS (OUTPATIENT)
Dept: HEALTH INFORMATION MANAGEMENT | Facility: CLINIC | Age: 83
End: 2018-11-15

## 2019-01-24 DIAGNOSIS — N40.0 BPH (BENIGN PROSTATIC HYPERPLASIA): ICD-10-CM

## 2019-01-24 DIAGNOSIS — R39.12 BENIGN PROSTATIC HYPERPLASIA WITH WEAK URINARY STREAM: ICD-10-CM

## 2019-01-24 DIAGNOSIS — N40.1 BENIGN PROSTATIC HYPERPLASIA WITH WEAK URINARY STREAM: ICD-10-CM

## 2019-01-24 RX ORDER — DUTASTERIDE 0.5 MG/1
0.5 CAPSULE, LIQUID FILLED ORAL DAILY
Qty: 90 CAPSULE | Refills: 1 | Status: SHIPPED | OUTPATIENT
Start: 2019-01-24 | End: 2020-03-24

## 2019-01-24 RX ORDER — TAMSULOSIN HYDROCHLORIDE 0.4 MG/1
0.4 CAPSULE ORAL DAILY
Qty: 90 CAPSULE | Refills: 1 | Status: SHIPPED | OUTPATIENT
Start: 2019-01-24 | End: 2020-03-24

## 2019-01-24 NOTE — TELEPHONE ENCOUNTER
I sent new RX to his pharmacy.  He changed pharmacy's at the beginning of the year.   Terrence [facility]  January 24, 2019 11:05 AM

## 2019-01-28 DIAGNOSIS — C61 MALIGNANT NEOPLASM OF PROSTATE (H): ICD-10-CM

## 2019-01-28 PROCEDURE — 84153 ASSAY OF PSA TOTAL: CPT | Performed by: UROLOGY

## 2019-01-28 PROCEDURE — 36415 COLL VENOUS BLD VENIPUNCTURE: CPT | Performed by: UROLOGY

## 2019-01-29 LAB — PSA SERPL-MCNC: 0.06 UG/L (ref 0–4)

## 2019-02-05 ENCOUNTER — OFFICE VISIT (OUTPATIENT)
Dept: UROLOGY | Facility: CLINIC | Age: 84
End: 2019-02-05
Payer: COMMERCIAL

## 2019-02-05 VITALS — BODY MASS INDEX: 27.36 KG/M2 | OXYGEN SATURATION: 92 % | WEIGHT: 202 LBS | HEIGHT: 72 IN | HEART RATE: 56 BPM

## 2019-02-05 DIAGNOSIS — C61 PROSTATE CANCER (H): Primary | ICD-10-CM

## 2019-02-05 LAB
ALBUMIN UR-MCNC: NEGATIVE MG/DL
APPEARANCE UR: CLEAR
BILIRUB UR QL STRIP: NEGATIVE
COLOR UR AUTO: YELLOW
GLUCOSE UR STRIP-MCNC: NEGATIVE MG/DL
HGB UR QL STRIP: NEGATIVE
KETONES UR STRIP-MCNC: NEGATIVE MG/DL
LEUKOCYTE ESTERASE UR QL STRIP: NEGATIVE
NITRATE UR QL: NEGATIVE
PH UR STRIP: 7 PH (ref 5–7)
SOURCE: NORMAL
SP GR UR STRIP: 1.02 (ref 1–1.03)
UROBILINOGEN UR STRIP-ACNC: 0.2 EU/DL (ref 0.2–1)

## 2019-02-05 PROCEDURE — 81003 URINALYSIS AUTO W/O SCOPE: CPT | Mod: QW | Performed by: UROLOGY

## 2019-02-05 PROCEDURE — 99212 OFFICE O/P EST SF 10 MIN: CPT | Performed by: UROLOGY

## 2019-02-05 ASSESSMENT — PAIN SCALES - GENERAL: PAINLEVEL: NO PAIN (0)

## 2019-02-05 ASSESSMENT — MIFFLIN-ST. JEOR: SCORE: 1639.27

## 2019-02-05 NOTE — LETTER
RE: Jorge Salomon   [address] [facility] [address]     Dear Colleague,    Thank you for referring your patient, Jorge Salomon, to the [facility] [place] [facility]. Please see a copy of my visit note below.    Jorge Salomon is a pleasant 85-year-old male who was treated with radiation for his prostate cancer.  His PSA is now 0.06.  He is having no difficulty  Other past medical history: Colonic polyps, dry eyes, abdominal hernia, hemorrhoids, IBS, macular retinal changes, mitral valve prolapse, high cholesterol, inguinal herniorrhaphy, eye surgeries, colonoscopy, vasectomy, non-smoker  Family history: Father  of prostate cancer  Medications: Now on Plaquenil and prednisone  Allergies: None  Review of systems: Arthritis pain is returning on 2.5 mg of prednisone daily, no dysuria or hematuria.  Using CPAP mask  Urinalysis: Normal  Exam: Normal appearance, alert and oriented, normal vital signs.  Normocephalic, normal respirations, neuro grossly intact  Assessment: Rheumatoid arthritis, sleep apnea, prostate cancer  Plan: See me in 6 months with PSA      Again, thank you for allowing me to participate in the care of your patient.      Sincerely,    Marek Miles MD

## 2019-02-05 NOTE — PROGRESS NOTES
Jorge Salomon is a pleasant 85-year-old male who was treated with radiation for his prostate cancer.  His PSA is now 0.06.  He is having no difficulty  Other past medical history: Colonic polyps, dry eyes, abdominal hernia, hemorrhoids, IBS, macular retinal changes, mitral valve prolapse, high cholesterol, inguinal herniorrhaphy, eye surgeries, colonoscopy, vasectomy, non-smoker  Family history: Father  of prostate cancer  Medications: Now on Plaquenil and prednisone  Allergies: None  Review of systems: Arthritis pain is returning on 2.5 mg of prednisone daily, no dysuria or hematuria.  Using CPAP mask  Urinalysis: Normal  Exam: Normal appearance, alert and oriented, normal vital signs.  Normocephalic, normal respirations, neuro grossly intact  Assessment: Rheumatoid arthritis, sleep apnea, prostate cancer  Plan: See me in 6 months with PSA

## 2019-02-05 NOTE — NURSING NOTE
Pt needs avodart and flomax refills.  PSA in epic.  Pt dx with rhumarodi arthritis and now has CPAP machine.  ROSIE Ferguosn CMA

## 2019-02-11 NOTE — PROGRESS NOTES
[facility]ology Clinic Visit     Jorge Salomon MRN# [id number]   YOB: 1933    Primary care provider: Srinivasa Tello  Feb 15, 2019          Assessment and Plan:   #  Seronegative rheumatoid arthritis Dx 2018  # Low titer PHAN positive; H/o Sjogren syndrome (Dx decades ago)- sicca syndrome   # Carpal tunnel syndrome S/p right release 5/18   # Anemia- macrocytic  # Leukopenia   # Increased inflammatory markers  # Chronic NSAID use  # Brother had Chron's disease   # Prostate cancer - completed radiotherapy 2017     Dry eyes: uses refresh eye drops  Dry mouth - he hydrates himself with water    Uric acid < 5.   RF negative.     Creat , AST, ALT within normal limits 3/18  We discussed lab results today:  PHAN antibody which is a generic antibody is borderline positive.   MEGAN antibody panel negative.   CCP rheumatoid antibody is normal.     Xray hand: no joint erosions noted. 6/18    We discussed that patient has seronegative rheumatoid arthritis. Carpal tunnel syndrome is likely related to this. Very responsive to prednisone even at 10 mg of prednisone per day.     Anemia: Colonoscopy- last one was 3-4 years ago. Negative per patient.   Also watch for myelodysplastic syndrome.Seen by Dr. Whittington in [facility]. Patient reports that there were no concerns for any malignancy. No bone marrow biopsy was done.     Avoid anti-TNFs because of prostate cancer diagnosis in 2017.      RA disease activity:  low on prednisone 5mg PO daily and plaquenil (started 9/2018). Continue prednisone 5mg PO daily (taper about 4 weeks ago increased polyarthralgia). Taper after 4/19. Continue plaquenil 200mg PO BID.     Recommend elemental calcium 1200mg PO daily and vitamin D 800 IU daily .    Wt Readings from Last 4 Encounters:   02/15/19 93.9 kg (207 lb)   02/05/19 91.6 kg (202 lb)   10/19/18 91.7 kg (202 lb 1.6 oz)   09/07/18 92.9 kg (204 lb 12.8 oz)     The labs from patient records are reviewed.     I will be back in  touch with the patient through mychart/letter when results are available.     Patient agrees with the above mentioned treatment plan.     Most Recent Immunizations   Administered Date(s) Administered     Influenza (IIV3) PF 10/25/2007     TD (ADULT, 7+) 02/17/2015     No orders of the defined types were placed in this encounter.      Return in about 2 months (around 4/15/2019).    Medications Discontinued During This Encounter   Medication Reason     hydroxychloroquine (PLAQUENIL) 200 MG tablet      predniSONE (DELTASONE) 2.5 MG tablet Reorder     Current Outpatient Medications   Medication Sig Dispense Refill     calcium-vitamin D (CALTRATE) 600-400 MG-UNIT per tablet Take 1 tablet by mouth daily  60 tablet      Cholecalciferol (VITAMIN D PO) Take  by mouth.       dutasteride (AVODART) 0.5 MG capsule Take 1 capsule (0.5 mg) by mouth daily 90 capsule 1     fish oil-omega-3 fatty acids (FISH OIL) 1000 MG capsule Take 2 g by mouth daily.       FLAXSEED, LINSEED, PO        GLUCOSAMINE PO Take by mouth 2 times daily        hydroxychloroquine (PLAQUENIL) 200 MG tablet Take 2 tablets (400 mg) by mouth daily 180 tablet 3     predniSONE (DELTASONE) 5 MG tablet February 1, 2019: take 5mg PO daily. 90 tablet 0     tamsulosin (FLOMAX) 0.4 MG capsule Take 1 capsule (0.4 mg) by mouth daily Take 30 mins after a meal 90 capsule 1     acetaminophen (TYLENOL) 500 MG tablet Take 1-2 tablets (500-1,000 mg) by mouth every 6 hours as needed for mild pain       ASPIRIN 81 MG OR TABS 1 TABLET DAILY       hydrocortisone (CORTAID) 1 % cream Apply sparingly to affected area twice daily as needed. (Patient not taking: Reported on 10/19/2018) 30 g 1       Kan Nelosn MD  [facility]          Active Problem List:     Patient Active Problem List    Diagnosis Date Noted     Sjogren's syndrome (H) 04/19/2018     Priority: Medium     Patient reports his ophthalmologist diagnosed him with this 30-40 years ago        Carpal  tunnel syndrome of right wrist 04/19/2018     Priority: Medium     Pelvic pain in male 05/31/2013     Priority: Medium     HYPERLIPIDEMIA LDL GOAL <100 10/31/2010     Priority: Medium     Prostate cancer (H) 06/19/2009     Priority: Medium     Macular puckering of retina      Priority: Medium     Irritable bowel syndrome 01/16/2003     Priority: Medium     Benign prostatic hyperplasia 01/16/2003     Priority: Medium     Problem list name updated by automated process. Provider to review and confirm       Benign neoplasm of colon 01/16/2003     Priority: Medium            History of Present Illness:     Chief Complaint   Patient presents with     RECHECK       May 14, 2018  Have you ever seen a rheumatologist No Who NA When NA  Joint pain history  Onset: pt states that he has body aches all over especially in hands and wrist, sx for about 2 months. Pt has elevated CRP and ESR  Involved joints: see above  Pain scale:  3.5/10     Wakes the patient from sleep : Yes  Morning stiffness:Yes for 30 minutes  Meds used:naproxen     Interim history  Since last visit:  1. Infections - No  2. New symptoms/medical problem - Yes/ had carpal tunnel surgery 2 weeks ago   3. Any side effects from Rheum medications -NA  3. ER visits/Hospitalizations/surgeries - Yes/ had carpal tunnel surgery   4. Last PCP visit: 1/12/17    Wt Readings from Last 4 Encounters:   02/15/19 93.9 kg (207 lb)   02/05/19 91.6 kg (202 lb)   10/19/18 91.7 kg (202 lb 1.6 oz)   09/07/18 92.9 kg (204 lb 12.8 oz)       No h/o unintentional weight loss, fevers, rash, swollen glands  No h/o gout  No family or personal history of psoriasis, ulcerative colitis or chron's disease.   Patient denies any raynauds  No h/o persistent shortness of breath, cough, chest pain  No h/o persistent vomiting, diarrhea, abdominal pain  No h/o hematochezia, hematuria, hemoptysis  No h/o seizures or strokes    June 29, 2018  Have you ever seen a rheumatologist yes Who you When 05/15/2018                                          Joint pain history, arthritis  Onset:   Involved joints: bilateral knees, ankles, shoulders and hands  Pain scale:  2/10   - 4 in the morning  Wakes the patient from sleep : No  Morning stiffness:Yes for 60 minutes  Meds used:Prednisone, Naproxen Sodium (not on both now)     Interim history  Since last visit:  1. Infections - No  2. New symptoms/medical problem - No  3. Any side effects from Rheum medications -None  3. ER visits/Hospitalizations/surgeries - No  4. Last PCP visit: 01/12/2017 September 7, 2018  Have you ever seen a rheumatologist Yes Who You When 6/29/18  Joint pain history  Onset: Bilateral knee, ankle, shoulder, and knee pain. Pt does state that the last two months he hasn't had much knee or ankle pain. He says hand pain is under control ever since starting the prednisone. Pt does note that the bilateral shoulder pain has gotten worse since the last visit.   Involved joints: See Above  Pain scale:  5/10     Wakes the patient from sleep : Yes-Very Bad Last Night, Woke up Ten Times Due to Pain. But did lot of yard work yesterday.    Morning stiffness:Yes for 120 minutes  Meds used: Prednisone and Tylenol prn.      Interim history  Since last visit:  1. Infections - No  2. New symptoms/medical problem - Pt states he believes he has been bruising easier.   3. Any side effects from Rheum medications -Bruising   3. ER visits/Hospitalizations/surgeries - No  4. Last PCP visit: 1/12/17 October 19, 2018  Have you ever seen a rheumatologist Yes Who You  When 9/7/18  Joint pain history  Onset:6-8  Months onset  Involved joints: Bilateral shoulder pain especially the right, bilateral hands- right hand still a little numb from carpal surgery 6 months ago, experiences stiffness in left hand every once and a while 1/10 on stiffness, knees are sore-little 1 day out of the week the same with the ankles   Pain scale:  1/10     Wakes the patient from sleep : Yes hard to  say from pain diagnosed with sleep apnea  Morning stiffness:Yes for 30 minutes  Meds used:Plaquenil and the prednisone      Interim history  Since last visit:  1. Infections - No  2. New symptoms/medical problem - Yes- sleep apnea  3. Any side effects from Rheum medications -may get a little dizzy from time to time could be medication related, usually happens in the morning when he gets up   3. ER visits/Hospitalizations/surgeries - No  4. Last PCP visit: 3/22/18    February 15, 2019  Have you ever seen a rheumatologist yes Who you When 10/19/18  Joint pain history  Onset: Patient is here for a follow up for RA. Patient reports things are stable and no change  Involved joints: shoulders, knees, hips, and occasoinally ankles, hands  Pain scale:  2/10     Wakes the patient from sleep : Yes  Morning stiffness:Yes for 60 minutes  Meds used:prednisone, plaquenil     Interim history  Since last visit:  1. Infections - No  2. New symptoms/medical problem - Yes, Is now on a cpap machine.   3. Any side effects from Rheum medications -bags under eyes, urination difficulties at times  3. ER visits/Hospitalizations/surgeries - No  4. Last PCP visit: 1/12/17 Dr. Tello; had a physical 4/19/18 with Dr. Pires      BP Readings from Last 3 Encounters:   02/15/19 108/60   10/19/18 120/72   09/07/18 110/62          Review of Systems:   Complete ROS negative except for symptoms mentioned in the HPI          Past Medical History:     Past Medical History:   Diagnosis Date     Benign neoplasm of colon 6/98, 3/05    Hyperplastic polyp on both procedures.     Dry eyes      Hernia, abdominal      Hypertrophy (benign) of prostate     hx of episodic prostatits     Internal hemorrhoids      Irritable bowel syndrome     IBS     Macular puckering of retina      Mitral valve prolapse      Mumps      Other disorders of vitreous     Vitreo-macular traction syndrome, left eye     Pure hypercholesterolemia      Past Surgical History:   Procedure  "Laterality Date     COLONOSCOPY      one \"1/4 inch hyperplastic\" polyp.     COLONOSCOPY  3/2005    One hyperplastic polyp     CYSTOSCOPY       Full thickness macular hole, left eye       HERNIORRHAPHY INGUINAL  2014    Procedure: HERNIORRHAPHY INGUINAL;  Surgeon: Reji Engle MD;  Location: [facility] OR     Left eye cataract  2000     Left vitreoretinal surgery  08     REMOVAL OF SPERM DUCT(S)  1970     Right eye Cataract  2012     VASECTOMY              Social History:     Social History     Occupational History     Not on file   Tobacco Use     Smoking status: Never Smoker     Smokeless tobacco: Never Used   Substance and Sexual Activity     Alcohol use: No     Drug use: No     Sexual activity: Not Currently          Family History:     Family History   Problem Relation Age of Onset     Heart Disease Mother          age 70. Had Heart Failure / inactive thyroid treatment cause cardiac failure     Thyroid Disease Mother         inactive thyroid, also \"2-3\" siblings     Prostate Cancer Father         Fatal Prostate CA,  age 80     Diabetes Brother         Born 1934.     Heart Disease Brother         Fatal MI, had diabetes.  age 82     Heart Disease Brother         Born 1917 (Jarrod). Has had pacemaker.     Heart Disease Sister         Born 1924 (Kim), unspecified \"heart problems\"     Family History Negative Sister         Born 1916 (Ayde).      Respiratory Brother          age 75, COPD/hip fracture/pneumonia          Allergies:     Allergies   Allergen Reactions     No Known Allergies           Medications:     Current Outpatient Medications   Medication Sig Dispense Refill     calcium-vitamin D (CALTRATE) 600-400 MG-UNIT per tablet Take 1 tablet by mouth daily  60 tablet      Cholecalciferol (VITAMIN D PO) Take  by mouth.       dutasteride (AVODART) 0.5 MG capsule Take 1 capsule (0.5 mg) by mouth daily 90 capsule 1     fish oil-omega-3 fatty acids (FISH OIL) " 1000 MG capsule Take 2 g by mouth daily.       FLAXSEED, LINSEED, PO        GLUCOSAMINE PO Take by mouth 2 times daily        hydroxychloroquine (PLAQUENIL) 200 MG tablet Take 2 tablets (400 mg) by mouth daily 180 tablet 3     predniSONE (DELTASONE) 5 MG tablet February 1, 2019: take 5mg PO daily. 90 tablet 0     tamsulosin (FLOMAX) 0.4 MG capsule Take 1 capsule (0.4 mg) by mouth daily Take 30 mins after a meal 90 capsule 1     acetaminophen (TYLENOL) 500 MG tablet Take 1-2 tablets (500-1,000 mg) by mouth every 6 hours as needed for mild pain       ASPIRIN 81 MG OR TABS 1 TABLET DAILY       hydrocortisone (CORTAID) 1 % cream Apply sparingly to affected area twice daily as needed. (Patient not taking: Reported on 10/19/2018) 30 g 1            Physical Exam:   Blood pressure 108/60, pulse 98, temperature 98.4  F (36.9  C), temperature source Oral, height 1.829 m (6'), weight 93.9 kg (207 lb), SpO2 95 %.  Wt Readings from Last 4 Encounters:   02/15/19 93.9 kg (207 lb)   02/05/19 91.6 kg (202 lb)   10/19/18 91.7 kg (202 lb 1.6 oz)   09/07/18 92.9 kg (204 lb 12.8 oz)     Constitutional: well-developed, appearing stated age; cooperative  MS: Synovitis in bilateral wrists, 2nd and 3rd MCPs- RESOLVED. Right 2nd PIP and left 3rd PIPs synovitis RESOLVED. Flexion contracture in right 2nd PIP- this is improving  Fist 100% bilateral.   Skin: no rash in exposed areas  Psych: nl judgement, orientation, memory, affect.         Data:         Kan Nelson MD    [facility]

## 2019-02-15 ENCOUNTER — OFFICE VISIT (OUTPATIENT)
Dept: RHEUMATOLOGY | Facility: CLINIC | Age: 84
End: 2019-02-15
Payer: COMMERCIAL

## 2019-02-15 VITALS
OXYGEN SATURATION: 95 % | DIASTOLIC BLOOD PRESSURE: 60 MMHG | SYSTOLIC BLOOD PRESSURE: 108 MMHG | BODY MASS INDEX: 28.04 KG/M2 | HEART RATE: 98 BPM | TEMPERATURE: 98.4 F | WEIGHT: 207 LBS | HEIGHT: 72 IN

## 2019-02-15 DIAGNOSIS — M19.90 INFLAMMATORY ARTHRITIS: ICD-10-CM

## 2019-02-15 DIAGNOSIS — M06.09 RHEUMATOID ARTHRITIS, SERONEGATIVE, MULTIPLE SITES (H): ICD-10-CM

## 2019-02-15 PROCEDURE — 99213 OFFICE O/P EST LOW 20 MIN: CPT | Performed by: INTERNAL MEDICINE

## 2019-02-15 RX ORDER — PREDNISONE 5 MG/1
TABLET ORAL
Qty: 90 TABLET | Refills: 0 | Status: SHIPPED | OUTPATIENT
Start: 2019-02-15 | End: 2019-04-15

## 2019-02-15 RX ORDER — HYDROXYCHLOROQUINE SULFATE 200 MG/1
400 TABLET, FILM COATED ORAL DAILY
Qty: 180 TABLET | Refills: 3 | Status: SHIPPED | OUTPATIENT
Start: 2019-02-15 | End: 2022-03-30

## 2019-02-15 ASSESSMENT — MIFFLIN-ST. JEOR: SCORE: 1661.95

## 2019-02-15 ASSESSMENT — ROUTINE ASSESSMENT OF PATIENT INDEX DATA (RAPID3)
RAPID3 INTERPRETATION: LOW 3.1-6
TOTAL RAPID3 SCORE: 6

## 2019-02-15 NOTE — NURSING NOTE
Chief Complaint   Patient presents with     RECHECK       Initial /60   Pulse 98   Temp 98.4  F (36.9  C) (Oral)   Ht 1.829 m (6')   Wt 93.9 kg (207 lb)   SpO2 95%   BMI 28.07 kg/m   Estimated body mass index is 28.07 kg/m  as calculated from the following:    Height as of this encounter: 1.829 m (6').    Weight as of this encounter: 93.9 kg (207 lb).  Medication Reconciliation: complete    Have you ever seen a rheumatologist yes Who you When 10/19/18  Joint pain history  Onset: Patient is here for a follow up for RA. Patient reports things are stable and no change  Involved joints: shoulders, knees, hips, and occasoinally ankles, hands  Pain scale:  2/10     Wakes the patient from sleep : Yes  Morning stiffness:Yes for 60 minutes  Meds used:prednisone, plaquenil    Interim history  Since last visit:  1. Infections - No  2. New symptoms/medical problem - Yes, Is now on a cpap machine.   3. Any side effects from Rheum medications -bags under eyes, urination difficulties at times  3. ER visits/Hospitalizations/surgeries - No  4. Last PCP visit: 1/12/17 Dr. Tello; had a physical 4/19/18 with Dr. Pires  Wt Readings from Last 4 Encounters:   02/15/19 93.9 kg (207 lb)   02/05/19 91.6 kg (202 lb)   10/19/18 91.7 kg (202 lb 1.6 oz)   09/07/18 92.9 kg (204 lb 12.8 oz)     BP Readings from Last 3 Encounters:   02/15/19 108/60   10/19/18 120/72   09/07/18 110/62

## 2019-03-27 ENCOUNTER — TELEPHONE (OUTPATIENT)
Dept: RHEUMATOLOGY | Facility: CLINIC | Age: 84
End: 2019-03-27

## 2019-03-27 NOTE — TELEPHONE ENCOUNTER
I called and spoke with patient and gave him provider's recommendations below. Patient verbalized understanding to me.     Lora Adrian, CMA

## 2019-03-27 NOTE — TELEPHONE ENCOUNTER
Less likely for plaquenil which was started 6 months ago, to cause a sudden onset rash.   I recommend continuing plaquenil and patient to contact Dr. Tello's office.   I will also CC Dr. Tello.

## 2019-03-27 NOTE — TELEPHONE ENCOUNTER
Pt calls.    He says he is on plaquenil which he has been taking for 7 months.      He thinks he is having an allergic reaction - he is having a rash that itches on both arms and occasional dizziness.  He states he is self diagnosing based on what he has read.  He has had the rash and dizziness for 3 days.  The rash is like spots on each arm and some scalling.  Wife says it is hives.  The hives are only on the arms.      No problems with breathing.  No swelling of the throat.      He is putting an aloe martha on the arms and that helps with the itching.    He thinks he is drinking plenty of fluids.      He wants this to go to Dr. Nelson and he is hoping to talk to him to see if he should decrease his dose or what he should do?

## 2019-04-01 NOTE — TELEPHONE ENCOUNTER
Contacted patient and advised that Dr. Tello recommends follow-up if rash does not improve. Pt has scheduled appointment with Dr. Medina for ongoing rash. Advised patient to keep appointment as scheduled.

## 2019-04-04 ENCOUNTER — OFFICE VISIT (OUTPATIENT)
Dept: INTERNAL MEDICINE | Facility: CLINIC | Age: 84
End: 2019-04-04
Payer: COMMERCIAL

## 2019-04-04 VITALS
HEIGHT: 72 IN | RESPIRATION RATE: 20 BRPM | OXYGEN SATURATION: 96 % | DIASTOLIC BLOOD PRESSURE: 74 MMHG | SYSTOLIC BLOOD PRESSURE: 121 MMHG | WEIGHT: 209.8 LBS | BODY MASS INDEX: 28.42 KG/M2 | TEMPERATURE: 97.7 F | HEART RATE: 95 BPM

## 2019-04-04 DIAGNOSIS — Z13.220 SCREENING CHOLESTEROL LEVEL: ICD-10-CM

## 2019-04-04 DIAGNOSIS — M35.00 SJOGREN'S SYNDROME, WITH UNSPECIFIED ORGAN INVOLVEMENT (H): ICD-10-CM

## 2019-04-04 DIAGNOSIS — C61 PROSTATE CANCER (H): ICD-10-CM

## 2019-04-04 DIAGNOSIS — R42 DIZZINESS: Primary | ICD-10-CM

## 2019-04-04 LAB
BASOPHILS # BLD AUTO: 0 10E9/L (ref 0–0.2)
BASOPHILS NFR BLD AUTO: 0 %
DIFFERENTIAL METHOD BLD: ABNORMAL
EOSINOPHIL # BLD AUTO: 0 10E9/L (ref 0–0.7)
EOSINOPHIL NFR BLD AUTO: 0.6 %
ERYTHROCYTE [DISTWIDTH] IN BLOOD BY AUTOMATED COUNT: 12.9 % (ref 10–15)
HCT VFR BLD AUTO: 39.4 % (ref 40–53)
HGB BLD-MCNC: 14 G/DL (ref 13.3–17.7)
LYMPHOCYTES # BLD AUTO: 0.4 10E9/L (ref 0.8–5.3)
LYMPHOCYTES NFR BLD AUTO: 8.4 %
MCH RBC QN AUTO: 36 PG (ref 26.5–33)
MCHC RBC AUTO-ENTMCNC: 35.5 G/DL (ref 31.5–36.5)
MCV RBC AUTO: 101 FL (ref 78–100)
MONOCYTES # BLD AUTO: 0.6 10E9/L (ref 0–1.3)
MONOCYTES NFR BLD AUTO: 11.7 %
NEUTROPHILS # BLD AUTO: 4.1 10E9/L (ref 1.6–8.3)
NEUTROPHILS NFR BLD AUTO: 79.3 %
PLATELET # BLD AUTO: 160 10E9/L (ref 150–450)
RBC # BLD AUTO: 3.89 10E12/L (ref 4.4–5.9)
WBC # BLD AUTO: 5.1 10E9/L (ref 4–11)

## 2019-04-04 PROCEDURE — 84443 ASSAY THYROID STIM HORMONE: CPT | Performed by: FAMILY MEDICINE

## 2019-04-04 PROCEDURE — 36415 COLL VENOUS BLD VENIPUNCTURE: CPT | Performed by: FAMILY MEDICINE

## 2019-04-04 PROCEDURE — 85025 COMPLETE CBC W/AUTO DIFF WBC: CPT | Performed by: FAMILY MEDICINE

## 2019-04-04 PROCEDURE — 80053 COMPREHEN METABOLIC PANEL: CPT | Performed by: FAMILY MEDICINE

## 2019-04-04 PROCEDURE — 99214 OFFICE O/P EST MOD 30 MIN: CPT | Performed by: FAMILY MEDICINE

## 2019-04-04 PROCEDURE — 80061 LIPID PANEL: CPT | Performed by: FAMILY MEDICINE

## 2019-04-04 RX ORDER — BETAMETHASONE DIPROPIONATE 0.5 MG/G
CREAM TOPICAL 2 TIMES DAILY
COMMUNITY
End: 2024-07-12

## 2019-04-04 ASSESSMENT — MIFFLIN-ST. JEOR: SCORE: 1669.65

## 2019-04-04 NOTE — PROGRESS NOTES
SUBJECTIVE:   Jorge Salomon is a 86 year old male who presents to clinic today for the following health issues:    CHIEF COMPLAINT    Dizziness  Eczema      HISTORY    He is getting some orthostatic dizziness.    He has eczema, especially of forearms.  Recently saw a dermatologist and was given Rx for betamethasone.    He is anxious.    Recently DX with RA.    Previously diagnosed with prostate cancer treated with radiation.  He is on 2 prostate medications.      Patient Active Problem List   Diagnosis     Irritable bowel syndrome     Benign prostatic hyperplasia     Benign neoplasm of colon     Macular puckering of retina     Prostate cancer (H)     HYPERLIPIDEMIA LDL GOAL <100     Pelvic pain in male     Sjogren's syndrome (H)     Carpal tunnel syndrome of right wrist     Current Outpatient Medications   Medication Sig Dispense Refill     betamethasone dipropionate (DIPROSONE) 0.05 % external cream Apply topically 2 times daily       calcium-vitamin D (CALTRATE) 600-400 MG-UNIT per tablet Take 1 tablet by mouth daily  60 tablet      Cholecalciferol (VITAMIN D PO) Take  by mouth.       dutasteride (AVODART) 0.5 MG capsule Take 1 capsule (0.5 mg) by mouth daily 90 capsule 1     fish oil-omega-3 fatty acids (FISH OIL) 1000 MG capsule Take 2 g by mouth daily.       FLAXSEED, LINSEED, PO        GLUCOSAMINE PO Take by mouth 2 times daily        hydroxychloroquine (PLAQUENIL) 200 MG tablet Take 2 tablets (400 mg) by mouth daily 180 tablet 3     predniSONE (DELTASONE) 5 MG tablet February 1, 2019: take 5mg PO daily. 90 tablet 0     tamsulosin (FLOMAX) 0.4 MG capsule Take 1 capsule (0.4 mg) by mouth daily Take 30 mins after a meal 90 capsule 1     acetaminophen (TYLENOL) 500 MG tablet Take 1-2 tablets (500-1,000 mg) by mouth every 6 hours as needed for mild pain       ASPIRIN 81 MG OR TABS 1 TABLET DAILY       hydrocortisone (CORTAID) 1 % cream Apply sparingly to affected area twice daily as needed. (Patient not  taking: Reported on 10/19/2018) 30 g 1       REVIEW OF SYSTEMS    No fevers or chills.  No cough or S OB.  No chest pains, no palpitations, no edema.  No nausea or abdominal pain.  No localized weakness or numbness.      Past Medical History:   Diagnosis Date     Benign neoplasm of colon 6/98, 3/05    Hyperplastic polyp on both procedures.     Dry eyes      Hernia, abdominal      Hypertrophy (benign) of prostate     hx of episodic prostatits     Internal hemorrhoids      Irritable bowel syndrome     IBS     Macular puckering of retina      Mitral valve prolapse      Mumps      Other disorders of vitreous     Vitreo-macular traction syndrome, left eye     Pure hypercholesterolemia        EXAM  /74 (BP Location: Right arm, Patient Position: Sitting, Cuff Size: Adult Regular)   Pulse 95   Temp 97.7  F (36.5  C) (Oral)   Resp 20   Ht 1.829 m (6')   Wt 95.2 kg (209 lb 12.8 oz)   SpO2 96%   BMI 28.45 kg/m      Basically well-appearing man.  NAD.  HEENT normal  Neck without masses.  Nonlabored breathing.  Abdomen nondistended.  Dorsum of forearms has moderate involvement with eczema without secondary infection.  Gait normal.      (R42) Dizziness  (primary encounter diagnosis)  Comment:   Suspicious for medication side effect-Flomax.  Plan: CBC with platelets and differential        Probably okay to continue and observe.  Return for persistently bothersome or worsening symptoms.    (Z13.220) Screening cholesterol level  Comment:   Patient requested.  Plan: Lipid panel reflex to direct LDL Non-fasting            (M35.00) Sjogren's syndrome, with unspecified organ involvement (H)  Comment:   Patient requested lab follow-up.  Plan: Comprehensive metabolic panel (BMP + Alb, Alk         Phos, ALT, AST, Total. Bili, TP), TSH with free        T4 reflex            (C61) Prostate cancer (H)  Comment: Noted.  Plan:     No change in his prostate medications for now.      See instructions.

## 2019-04-05 LAB
ALBUMIN SERPL-MCNC: 3.8 G/DL (ref 3.4–5)
ALP SERPL-CCNC: 50 U/L (ref 40–150)
ALT SERPL W P-5'-P-CCNC: 26 U/L (ref 0–70)
ANION GAP SERPL CALCULATED.3IONS-SCNC: 4 MMOL/L (ref 3–14)
AST SERPL W P-5'-P-CCNC: 22 U/L (ref 0–45)
BILIRUB SERPL-MCNC: 0.4 MG/DL (ref 0.2–1.3)
BUN SERPL-MCNC: 12 MG/DL (ref 7–30)
CALCIUM SERPL-MCNC: 9.4 MG/DL (ref 8.5–10.1)
CHLORIDE SERPL-SCNC: 105 MMOL/L (ref 94–109)
CHOLEST SERPL-MCNC: 159 MG/DL
CO2 SERPL-SCNC: 29 MMOL/L (ref 20–32)
CREAT SERPL-MCNC: 0.99 MG/DL (ref 0.66–1.25)
GFR SERPL CREATININE-BSD FRML MDRD: 69 ML/MIN/{1.73_M2}
GLUCOSE SERPL-MCNC: 140 MG/DL (ref 70–99)
HDLC SERPL-MCNC: 42 MG/DL
LDLC SERPL CALC-MCNC: 102 MG/DL
NONHDLC SERPL-MCNC: 117 MG/DL
POTASSIUM SERPL-SCNC: 4.4 MMOL/L (ref 3.4–5.3)
PROT SERPL-MCNC: 7.1 G/DL (ref 6.8–8.8)
SODIUM SERPL-SCNC: 138 MMOL/L (ref 133–144)
TRIGL SERPL-MCNC: 77 MG/DL
TSH SERPL DL<=0.005 MIU/L-ACNC: 1.38 MU/L (ref 0.4–4)

## 2019-04-10 DIAGNOSIS — C61 PROSTATE CANCER (H): Primary | ICD-10-CM

## 2019-04-10 RX ORDER — TAMSULOSIN HYDROCHLORIDE 0.4 MG/1
0.4 CAPSULE ORAL DAILY
Qty: 90 CAPSULE | Refills: 3 | Status: SHIPPED | OUTPATIENT
Start: 2019-04-10 | End: 2019-07-09

## 2019-04-10 RX ORDER — DUTASTERIDE 0.5 MG/1
0.5 CAPSULE, LIQUID FILLED ORAL DAILY
Qty: 90 CAPSULE | Refills: 3 | Status: SHIPPED | OUTPATIENT
Start: 2019-04-10 | End: 2021-03-30

## 2019-04-11 NOTE — PROGRESS NOTES
[place] - Rheumatology Clinic Visit     Jorge Salomon MRN# [id number]   YOB: 1933    Primary care provider: Srinivasa Tello  Apr 15, 2019          Assessment and Plan:   #  Seronegative rheumatoid arthritis Dx 2018  # Low titer PHAN positive; H/o Sjogren syndrome (Dx decades ago)- sicca syndrome   # Carpal tunnel syndrome S/p right release 5/18   # Anemia- macrocytic  # Leukopenia   # Increased inflammatory markers  # Chronic NSAID use  # Brother had Chron's disease   # Prostate cancer - completed radiotherapy 2017     Dry eyes: uses refresh eye drops  Dry mouth - he hydrates himself with water    Uric acid < 5.   RF negative.     Creat , AST, ALT within normal limits 3/18  We discussed lab results today:  PHAN antibody which is a generic antibody is borderline positive.   MEGAN antibody panel negative.   CCP rheumatoid antibody is normal.     Xray hand: no joint erosions noted. 6/18    We discussed that patient has seronegative rheumatoid arthritis. Carpal tunnel syndrome is likely related to this. Very responsive to prednisone even at 10 mg of prednisone per day.     Anemia: Colonoscopy- last one was 3-4 years ago. Negative per patient.   Also watch for myelodysplastic syndrome.Seen by Dr. Whittington in [facility]. Patient reports that there were no concerns for any malignancy. No bone marrow biopsy was done.     Avoid anti-TNFs because of prostate cancer diagnosis in 2017.      RA disease activity:  low on prednisone 5mg PO daily and plaquenil (started 9/2018). Continue prednisone 5mg PO daily (taper about 4 weeks ago increased polyarthralgia). Taper after 4/19. Continue plaquenil 200mg PO BID.     Eye exam- recent completed. Patient to notify Eye MD that he is on plaquenil.     Stop calcium. Continue vitamin D 1000 international unit(s) daily.     Wt Readings from Last 4 Encounters:   04/15/19 92.9 kg (204 lb 12.8 oz)   04/04/19 95.2 kg (209 lb 12.8 oz)   02/15/19 93.9 kg (207 lb)   02/05/19 91.6 kg  (202 lb)     The labs from patient records are reviewed.     I will be back in touch with the patient through mychart/letter when results are available.     Notified patient that my last day at [facility] is end of June. Gave options regarding other rheumatologists in the system.     Patient agrees with the above mentioned treatment plan.     Most Recent Immunizations   Administered Date(s) Administered     Influenza (IIV3) PF 10/25/2007     TD (ADULT, 7+) 02/17/2015     No orders of the defined types were placed in this encounter.      No follow-ups on file.    Medications Discontinued During This Encounter   Medication Reason     predniSONE (DELTASONE) 5 MG tablet      Current Outpatient Medications   Medication Sig Dispense Refill     acetaminophen (TYLENOL) 500 MG tablet Take 1-2 tablets (500-1,000 mg) by mouth every 6 hours as needed for mild pain       ASPIRIN 81 MG OR TABS 1 TABLET DAILY       betamethasone dipropionate (DIPROSONE) 0.05 % external cream Apply topically 2 times daily       calcium-vitamin D (CALTRATE) 600-400 MG-UNIT per tablet Take 1 tablet by mouth daily  60 tablet      Cholecalciferol (VITAMIN D PO) Take  by mouth.       dutasteride (AVODART) 0.5 MG capsule Take 1 capsule (0.5 mg) by mouth daily 90 capsule 3     dutasteride (AVODART) 0.5 MG capsule Take 1 capsule (0.5 mg) by mouth daily 90 capsule 1     fish oil-omega-3 fatty acids (FISH OIL) 1000 MG capsule Take 2 g by mouth daily.       FLAXSEED, LINSEED, PO        GLUCOSAMINE PO Take by mouth 2 times daily        hydrocortisone (CORTAID) 1 % cream Apply sparingly to affected area twice daily as needed. 30 g 1     hydroxychloroquine (PLAQUENIL) 200 MG tablet Take 2 tablets (400 mg) by mouth daily 180 tablet 3     tamsulosin (FLOMAX) 0.4 MG capsule Take 1 capsule (0.4 mg) by mouth daily 90 capsule 3     tamsulosin (FLOMAX) 0.4 MG capsule Take 1 capsule (0.4 mg) by mouth daily Take 30 mins after a meal 90 capsule 1       Kan Vickers  MD Leslie  [facility]          Active Problem List:     Patient Active Problem List    Diagnosis Date Noted     Sjogren's syndrome (H) 04/19/2018     Priority: Medium     Patient reports his ophthalmologist diagnosed him with this 30-40 years ago        Carpal tunnel syndrome of right wrist 04/19/2018     Priority: Medium     Pelvic pain in male 05/31/2013     Priority: Medium     HYPERLIPIDEMIA LDL GOAL <100 10/31/2010     Priority: Medium     Prostate cancer (H) 06/19/2009     Priority: Medium     Macular puckering of retina      Priority: Medium     Irritable bowel syndrome 01/16/2003     Priority: Medium     Benign prostatic hyperplasia 01/16/2003     Priority: Medium     Problem list name updated by automated process. Provider to review and confirm       Benign neoplasm of colon 01/16/2003     Priority: Medium            History of Present Illness:     Chief Complaint   Patient presents with     RECHECK       May 14, 2018  Have you ever seen a rheumatologist No Who NA When NA  Joint pain history  Onset: pt states that he has body aches all over especially in hands and wrist, sx for about 2 months. Pt has elevated CRP and ESR  Involved joints: see above  Pain scale:  3.5/10     Wakes the patient from sleep : Yes  Morning stiffness:Yes for 30 minutes  Meds used:naproxen     Interim history  Since last visit:  1. Infections - No  2. New symptoms/medical problem - Yes/ had carpal tunnel surgery 2 weeks ago   3. Any side effects from Rheum medications -NA  3. ER visits/Hospitalizations/surgeries - Yes/ had carpal tunnel surgery   4. Last PCP visit: 1/12/17    Wt Readings from Last 4 Encounters:   04/15/19 92.9 kg (204 lb 12.8 oz)   04/04/19 95.2 kg (209 lb 12.8 oz)   02/15/19 93.9 kg (207 lb)   02/05/19 91.6 kg (202 lb)       No h/o unintentional weight loss, fevers, rash, swollen glands  No h/o gout  No family or personal history of psoriasis, ulcerative colitis or chron's disease.   Patient denies  any raynauds  No h/o persistent shortness of breath, cough, chest pain  No h/o persistent vomiting, diarrhea, abdominal pain  No h/o hematochezia, hematuria, hemoptysis  No h/o seizures or strokes    June 29, 2018  Have you ever seen a rheumatologist yes Who you When 05/15/2018                                         Joint pain history, arthritis  Onset:   Involved joints: bilateral knees, ankles, shoulders and hands  Pain scale:  2/10   - 4 in the morning  Wakes the patient from sleep : No  Morning stiffness:Yes for 60 minutes  Meds used:Prednisone, Naproxen Sodium (not on both now)     Interim history  Since last visit:  1. Infections - No  2. New symptoms/medical problem - No  3. Any side effects from Rheum medications -None  3. ER visits/Hospitalizations/surgeries - No  4. Last PCP visit: 01/12/2017 September 7, 2018  Have you ever seen a rheumatologist Yes Who You When 6/29/18  Joint pain history  Onset: Bilateral knee, ankle, shoulder, and knee pain. Pt does state that the last two months he hasn't had much knee or ankle pain. He says hand pain is under control ever since starting the prednisone. Pt does note that the bilateral shoulder pain has gotten worse since the last visit.   Involved joints: See Above  Pain scale:  5/10     Wakes the patient from sleep : Yes-Very Bad Last Night, Woke up Ten Times Due to Pain. But did lot of yard work yesterday.    Morning stiffness:Yes for 120 minutes  Meds used: Prednisone and Tylenol prn.      Interim history  Since last visit:  1. Infections - No  2. New symptoms/medical problem - Pt states he believes he has been bruising easier.   3. Any side effects from Rheum medications -Bruising   3. ER visits/Hospitalizations/surgeries - No  4. Last PCP visit: 1/12/17 October 19, 2018  Have you ever seen a rheumatologist Yes Who You  When 9/7/18  Joint pain history  Onset:6-8  Months onset  Involved joints: Bilateral shoulder pain especially the right, bilateral hands-  right hand still a little numb from carpal surgery 6 months ago, experiences stiffness in left hand every once and a while 1/10 on stiffness, knees are sore-little 1 day out of the week the same with the ankles   Pain scale:  1/10     Wakes the patient from sleep : Yes hard to say from pain diagnosed with sleep apnea  Morning stiffness:Yes for 30 minutes  Meds used:Plaquenil and the prednisone      Interim history  Since last visit:  1. Infections - No  2. New symptoms/medical problem - Yes- sleep apnea  3. Any side effects from Rheum medications -may get a little dizzy from time to time could be medication related, usually happens in the morning when he gets up   3. ER visits/Hospitalizations/surgeries - No  4. Last PCP visit: 3/22/18    February 15, 2019  Have you ever seen a rheumatologist yes Who you When 10/19/18  Joint pain history  Onset: Patient is here for a follow up for RA. Patient reports things are stable and no change  Involved joints: shoulders, knees, hips, and occasoinally ankles, hands  Pain scale:  2/10     Wakes the patient from sleep : Yes  Morning stiffness:Yes for 60 minutes  Meds used:prednisone, plaquenil     Interim history  Since last visit:  1. Infections - No  2. New symptoms/medical problem - Yes, Is now on a cpap machine.   3. Any side effects from Rheum medications -bags under eyes, urination difficulties at times  3. ER visits/Hospitalizations/surgeries - No  4. Last PCP visit: 1/12/17 Dr. Tello; had a physical 4/19/18 with Dr. Pires    April 15, 2019  Have you ever seen a rheumatologist Yes, Dr GAUTHIER on 2/5/19  Joint pain history  Involved joints: shoulders, hands, knees ankles  Pain scale:  1/10     Wakes the patient from sleep : Yes,on occasion  Morning stiffness:No  Meds used  Plaquenil, prednisone     Interim history  Since last visit:  1. Infections - No  2. New symptoms/medical problem - No  3. Any side effects from Rheum medications -no  3. ER visits/Hospitalizations/surgeries -  "No  4. Last PCP visit: 2018      BP Readings from Last 3 Encounters:   04/15/19 136/78   19 121/74   02/15/19 108/60          Review of Systems:   Complete ROS negative except for symptoms mentioned in the HPI          Past Medical History:     Past Medical History:   Diagnosis Date     Benign neoplasm of colon , 3/05    Hyperplastic polyp on both procedures.     Dry eyes      Hernia, abdominal      Hypertrophy (benign) of prostate     hx of episodic prostatits     Internal hemorrhoids      Irritable bowel syndrome     IBS     Macular puckering of retina      Mitral valve prolapse      Mumps      Other disorders of vitreous     Vitreo-macular traction syndrome, left eye     Pure hypercholesterolemia      Past Surgical History:   Procedure Laterality Date     COLONOSCOPY      one \"1/4 inch hyperplastic\" polyp.     COLONOSCOPY  3/2005    One hyperplastic polyp     CYSTOSCOPY       Full thickness macular hole, left eye       HERNIORRHAPHY INGUINAL  2014    Procedure: HERNIORRHAPHY INGUINAL;  Surgeon: Reji Engle MD;  Location: [facility] OR     Left eye cataract  2000     Left vitreoretinal surgery  08     REMOVAL OF SPERM DUCT(S)  1970     Right eye Cataract  2012     VASECTOMY              Social History:     Social History     Occupational History     Not on file   Tobacco Use     Smoking status: Never Smoker     Smokeless tobacco: Never Used   Substance and Sexual Activity     Alcohol use: No     Drug use: No     Sexual activity: Not Currently          Family History:     Family History   Problem Relation Age of Onset     Heart Disease Mother          age 70. Had Heart Failure / inactive thyroid treatment cause cardiac failure     Thyroid Disease Mother         inactive thyroid, also \"2-3\" siblings     Prostate Cancer Father         Fatal Prostate CA,  age 80     Diabetes Brother         Born 1934.     Heart Disease Brother         Fatal MI, had diabetes. " " age 82     Heart Disease Brother         Born 1917 (Jarrod). Has had pacemaker.     Heart Disease Sister         Born 1924 (Kim), unspecified \"heart problems\"     Family History Negative Sister         Born 1916 (Ayde).      Respiratory Brother          age 75, COPD/hip fracture/pneumonia          Allergies:     Allergies   Allergen Reactions     No Known Allergies           Medications:     Current Outpatient Medications   Medication Sig Dispense Refill     acetaminophen (TYLENOL) 500 MG tablet Take 1-2 tablets (500-1,000 mg) by mouth every 6 hours as needed for mild pain       ASPIRIN 81 MG OR TABS 1 TABLET DAILY       betamethasone dipropionate (DIPROSONE) 0.05 % external cream Apply topically 2 times daily       calcium-vitamin D (CALTRATE) 600-400 MG-UNIT per tablet Take 1 tablet by mouth daily  60 tablet      Cholecalciferol (VITAMIN D PO) Take  by mouth.       dutasteride (AVODART) 0.5 MG capsule Take 1 capsule (0.5 mg) by mouth daily 90 capsule 3     dutasteride (AVODART) 0.5 MG capsule Take 1 capsule (0.5 mg) by mouth daily 90 capsule 1     fish oil-omega-3 fatty acids (FISH OIL) 1000 MG capsule Take 2 g by mouth daily.       FLAXSEED, LINSEED, PO        GLUCOSAMINE PO Take by mouth 2 times daily        hydrocortisone (CORTAID) 1 % cream Apply sparingly to affected area twice daily as needed. 30 g 1     hydroxychloroquine (PLAQUENIL) 200 MG tablet Take 2 tablets (400 mg) by mouth daily 180 tablet 3     tamsulosin (FLOMAX) 0.4 MG capsule Take 1 capsule (0.4 mg) by mouth daily 90 capsule 3     tamsulosin (FLOMAX) 0.4 MG capsule Take 1 capsule (0.4 mg) by mouth daily Take 30 mins after a meal 90 capsule 1            Physical Exam:   Blood pressure 136/78, pulse 56, temperature 98  F (36.7  C), temperature source Oral, height 1.829 m (6'), weight 92.9 kg (204 lb 12.8 oz), SpO2 95 %.  Wt Readings from Last 4 Encounters:   04/15/19 92.9 kg (204 lb 12.8 oz)   19 95.2 kg (209 lb 12.8 oz) "   02/15/19 93.9 kg (207 lb)   02/05/19 91.6 kg (202 lb)     Constitutional: well-developed, appearing stated age; cooperative  MS: Synovitis in bilateral wrists, 2nd and 3rd MCPs- RESOLVED. Right 2nd PIP and left 3rd PIPs synovitis RESOLVED. Flexion contracture in right 2nd PIP- this is improving  ROM good in hips, knees and ankles.   Skin: flaking noted in forearms; no classic redness or erythema  Psych: nl judgement, orientation, memory, affect.         Data:         Kan Nelson MD    [facility]

## 2019-04-15 ENCOUNTER — OFFICE VISIT (OUTPATIENT)
Dept: RHEUMATOLOGY | Facility: CLINIC | Age: 84
End: 2019-04-15
Payer: COMMERCIAL

## 2019-04-15 VITALS
TEMPERATURE: 98 F | DIASTOLIC BLOOD PRESSURE: 78 MMHG | OXYGEN SATURATION: 95 % | BODY MASS INDEX: 27.74 KG/M2 | WEIGHT: 204.8 LBS | HEART RATE: 56 BPM | SYSTOLIC BLOOD PRESSURE: 136 MMHG | HEIGHT: 72 IN

## 2019-04-15 DIAGNOSIS — M06.09 RHEUMATOID ARTHRITIS, SERONEGATIVE, MULTIPLE SITES (H): Primary | ICD-10-CM

## 2019-04-15 PROCEDURE — 99213 OFFICE O/P EST LOW 20 MIN: CPT | Performed by: INTERNAL MEDICINE

## 2019-04-15 ASSESSMENT — MIFFLIN-ST. JEOR: SCORE: 1646.97

## 2019-04-15 NOTE — PATIENT INSTRUCTIONS
Jona Krause DO  Rheumatologist  Specialties:  Rheumatology and Arthritis Care  Location:  [facility] [place]  [phone]                Kae Gong MD  Rheumatologist  Specialties:  Rheumatology and Arthritis Care  Location:  [facility]  [phone]                Mark Ricardo MD  Rheumatologist  Specialties:  Rheumatology and Arthritis Care  Locations  [facility] [phone]  [facility] [phone]  [facility] [place] [phone]  Request an [id number] ([phone])

## 2019-04-15 NOTE — NURSING NOTE
Chief Complaint   Patient presents with     RECHECK       Initial /78   Pulse 56   Temp 98  F (36.7  C) (Oral)   Ht 1.829 m (6')   Wt 92.9 kg (204 lb 12.8 oz)   SpO2 95%   BMI 27.78 kg/m   Estimated body mass index is 27.78 kg/m  as calculated from the following:    Height as of this encounter: 1.829 m (6').    Weight as of this encounter: 92.9 kg (204 lb 12.8 oz).  Medication Reconciliation: complete    Have you ever seen a rheumatologist Yes, Dr GAUTHIER on 2/5/19  Joint pain history  Involved joints: shoulders, hands, knees ankles  Pain scale:  1/10     Wakes the patient from sleep : Yes,on occasion  Morning stiffness:No  Meds used  Plaquenil, prednisone    Interim history  Since last visit:  1. Infections - No  2. New symptoms/medical problem - No  3. Any side effects from Rheum medications -no  3. ER visits/Hospitalizations/surgeries - No  4. Last PCP visit: 04/19/2018  Wt Readings from Last 4 Encounters:   04/15/19 92.9 kg (204 lb 12.8 oz)   04/04/19 95.2 kg (209 lb 12.8 oz)   02/15/19 93.9 kg (207 lb)   02/05/19 91.6 kg (202 lb)     BP Readings from Last 3 Encounters:   04/15/19 136/78   04/04/19 121/74   02/15/19 108/60

## 2019-07-30 DIAGNOSIS — C61 PROSTATE CANCER (H): ICD-10-CM

## 2019-07-30 DIAGNOSIS — C61 PROSTATE CANCER (H): Primary | ICD-10-CM

## 2019-07-30 PROCEDURE — 84153 ASSAY OF PSA TOTAL: CPT | Performed by: UROLOGY

## 2019-07-30 PROCEDURE — 36415 COLL VENOUS BLD VENIPUNCTURE: CPT | Performed by: UROLOGY

## 2019-07-31 LAB — PSA SERPL-MCNC: 0.04 UG/L (ref 0–4)

## 2019-08-02 ENCOUNTER — TRANSFERRED RECORDS (OUTPATIENT)
Dept: HEALTH INFORMATION MANAGEMENT | Facility: CLINIC | Age: 84
End: 2019-08-02

## 2019-08-06 ENCOUNTER — OFFICE VISIT (OUTPATIENT)
Dept: UROLOGY | Facility: CLINIC | Age: 84
End: 2019-08-06
Payer: COMMERCIAL

## 2019-08-06 VITALS — OXYGEN SATURATION: 95 % | HEART RATE: 76 BPM | BODY MASS INDEX: 27.63 KG/M2 | WEIGHT: 204 LBS | HEIGHT: 72 IN

## 2019-08-06 DIAGNOSIS — C61 PROSTATE CANCER (H): Primary | ICD-10-CM

## 2019-08-06 LAB
ALBUMIN UR-MCNC: ABNORMAL MG/DL
APPEARANCE UR: CLEAR
BILIRUB UR QL STRIP: NEGATIVE
COLOR UR AUTO: YELLOW
GLUCOSE UR STRIP-MCNC: NEGATIVE MG/DL
HGB UR QL STRIP: NEGATIVE
KETONES UR STRIP-MCNC: NEGATIVE MG/DL
LEUKOCYTE ESTERASE UR QL STRIP: NEGATIVE
NITRATE UR QL: NEGATIVE
PH UR STRIP: 7 PH (ref 5–7)
SOURCE: ABNORMAL
SP GR UR STRIP: 1.02 (ref 1–1.03)
UROBILINOGEN UR STRIP-ACNC: 0.2 EU/DL (ref 0.2–1)

## 2019-08-06 PROCEDURE — 99212 OFFICE O/P EST SF 10 MIN: CPT | Performed by: UROLOGY

## 2019-08-06 PROCEDURE — 81003 URINALYSIS AUTO W/O SCOPE: CPT | Mod: QW | Performed by: UROLOGY

## 2019-08-06 ASSESSMENT — MIFFLIN-ST. JEOR: SCORE: 1643.34

## 2019-08-06 ASSESSMENT — PAIN SCALES - GENERAL: PAINLEVEL: MILD PAIN (2)

## 2019-08-06 NOTE — LETTER
8/6/2019       RE: Jorge Salomon  [address] [place] [address]     Dear Colleague,    Thank you for referring your patient, Jorge Salomon, to the [facility]OLOGY [facility]IC [place] at [facility]. Please see a copy of my visit note below.    Jorge is an 86-year-old male who underwent radiation therapy for adenocarcinoma of the prostate.  His PSA has decreased to 0.04.  He has a normal urinalysis.  He denies any dysuria or hematuria.  He is up 3 times at night due to the combination of arthritic pain in his shoulders, a dry mouth from his CPAP and the need to void.  He does not void large volumes.  He has no trouble during the day.  Other past medical history: Colonic polyps, arthritis, Sjogren's, hemorrhoids, abdominal hernia, IBS, retinal disease, mitral valve prolapse, high cholesterol, surgeries reviewed, non-smoker  Medications: No change  Allergies: None   Review of systems: As above  Exam: Alert and oriented, normal appearance, normal vital signs, normal respirations, neuro grossly intact  Assessment: Adenocarcinoma of the prostate- PSA stable  Discussed follow-up, avoiding caffeine after mid afternoon, avoiding chocolate and alcohol at night  Plan: See me with PSA in 6 months    Again, thank you for allowing me to participate in the care of your patient.      Sincerely,    Marek Miles MD

## 2019-08-06 NOTE — PROGRESS NOTES
Jorge is an 86-year-old male who underwent radiation therapy for adenocarcinoma of the prostate.  His PSA has decreased to 0.04.  He has a normal urinalysis.  He denies any dysuria or hematuria.  He is up 3 times at night due to the combination of arthritic pain in his shoulders, a dry mouth from his CPAP and the need to void.  He does not void large volumes.  He has no trouble during the day.  Other past medical history: Colonic polyps, arthritis, Sjogren's, hemorrhoids, abdominal hernia, IBS, retinal disease, mitral valve prolapse, high cholesterol, surgeries reviewed, non-smoker  Medications: No change  Allergies: None   Review of systems: As above  Exam: Alert and oriented, normal appearance, normal vital signs, normal respirations, neuro grossly intact  Assessment: Adenocarcinoma of the prostate- PSA stable  Discussed follow-up, avoiding caffeine after mid afternoon, avoiding chocolate and alcohol at night  Plan: See me with PSA in 6 months

## 2019-10-04 ENCOUNTER — HEALTH MAINTENANCE LETTER (OUTPATIENT)
Age: 84
End: 2019-10-04

## 2019-11-11 ENCOUNTER — TRANSFERRED RECORDS (OUTPATIENT)
Dept: HEALTH INFORMATION MANAGEMENT | Facility: CLINIC | Age: 84
End: 2019-11-11

## 2020-01-14 ENCOUNTER — TRANSFERRED RECORDS (OUTPATIENT)
Dept: HEALTH INFORMATION MANAGEMENT | Facility: CLINIC | Age: 85
End: 2020-01-14

## 2020-02-04 DIAGNOSIS — C61 PROSTATE CANCER (H): ICD-10-CM

## 2020-02-04 PROCEDURE — 84153 ASSAY OF PSA TOTAL: CPT | Performed by: UROLOGY

## 2020-02-04 PROCEDURE — 36415 COLL VENOUS BLD VENIPUNCTURE: CPT | Performed by: UROLOGY

## 2020-02-05 LAB — PSA SERPL-MCNC: 0.03 UG/L (ref 0–4)

## 2020-02-08 ENCOUNTER — HEALTH MAINTENANCE LETTER (OUTPATIENT)
Age: 85
End: 2020-02-08

## 2020-02-10 ENCOUNTER — TRANSFERRED RECORDS (OUTPATIENT)
Dept: HEALTH INFORMATION MANAGEMENT | Facility: CLINIC | Age: 85
End: 2020-02-10

## 2020-02-11 RX ORDER — PREDNISONE 2.5 MG/1
2.5 TABLET ORAL DAILY
COMMUNITY
Start: 2019-11-11

## 2020-02-26 DIAGNOSIS — N40.0 BPH (BENIGN PROSTATIC HYPERPLASIA): Primary | ICD-10-CM

## 2020-02-27 ENCOUNTER — OFFICE VISIT (OUTPATIENT)
Dept: UROLOGY | Facility: CLINIC | Age: 85
End: 2020-02-27
Payer: COMMERCIAL

## 2020-02-27 VITALS
BODY MASS INDEX: 26.51 KG/M2 | HEIGHT: 73 IN | HEART RATE: 83 BPM | OXYGEN SATURATION: 97 % | DIASTOLIC BLOOD PRESSURE: 64 MMHG | SYSTOLIC BLOOD PRESSURE: 120 MMHG | WEIGHT: 200 LBS

## 2020-02-27 DIAGNOSIS — C61 PROSTATE CANCER (H): Primary | ICD-10-CM

## 2020-02-27 LAB
ALBUMIN UR-MCNC: NEGATIVE MG/DL
APPEARANCE UR: CLEAR
BILIRUB UR QL STRIP: NEGATIVE
COLOR UR AUTO: YELLOW
GLUCOSE UR STRIP-MCNC: NEGATIVE MG/DL
HGB UR QL STRIP: NEGATIVE
KETONES UR STRIP-MCNC: NEGATIVE MG/DL
LEUKOCYTE ESTERASE UR QL STRIP: NEGATIVE
NITRATE UR QL: NEGATIVE
PH UR STRIP: 6 PH (ref 5–7)
RESIDUAL VOLUME (RV) (EXTERNAL): 68
SOURCE: NORMAL
SP GR UR STRIP: 1.02 (ref 1–1.03)
UROBILINOGEN UR STRIP-ACNC: 0.2 EU/DL (ref 0.2–1)

## 2020-02-27 PROCEDURE — 99212 OFFICE O/P EST SF 10 MIN: CPT | Mod: 25 | Performed by: UROLOGY

## 2020-02-27 PROCEDURE — 51798 US URINE CAPACITY MEASURE: CPT | Performed by: UROLOGY

## 2020-02-27 PROCEDURE — 81003 URINALYSIS AUTO W/O SCOPE: CPT | Mod: QW | Performed by: UROLOGY

## 2020-02-27 ASSESSMENT — MIFFLIN-ST. JEOR: SCORE: 1633.13

## 2020-02-27 ASSESSMENT — PAIN SCALES - GENERAL: PAINLEVEL: NO PAIN (1)

## 2020-02-27 NOTE — NURSING NOTE
Chief Complaint   Patient presents with     Prostate Cancer     Post Void Residual: 68 mL    Melani Noguera, EMT

## 2020-02-27 NOTE — LETTER
2/27/2020       RE: Jorge Salomon  [address] [facility] [address]     Dear Colleague,    Thank you for referring your patient, Jorge Salomon, to the [facility]OLOGY CLINIC [place] at [facility]. Please see a copy of my visit note below.    Jorge is an 86-year-old male who was radiated several years ago for prostate cancer.  His PSA is down to 0.03.  He denies any dysuria or hematuria.  He has a intermittent stream and double voids at night but has normal urination during the daytime.  He does drink some caffeine late in the day and enjoys chocolate- I suggested he change those habits to help decrease nighttime frequency.  He also wakes up at night because of pain from his arthritis  Other past medical history: Irritable bowel, colon polyps, macular changes, hyperlipidemia, Sjogren's syndrome, carpal tunnel  Medications: Tylenol, betamethasone cream, vitamin D, Avodart, fish oil, glucosamine, hydrocortisone cream, Plaquenil, prednisone, Flomax, baby aspirin  Allergies: None  Review of systems: As above  Urinalysis normal.  Postvoid residual 68 ml  Exam: Alert and oriented, normal appearance, normal vital signs.  Normal respirations, neuro grossly intact  Assessment: Stable PSA, nocturia as before  Plan: Avoid caffeine and chocolate.  See me in 6 months with PSA    Again, thank you for allowing me to participate in the care of your patient.      Sincerely,    Marek Miles MD

## 2020-02-27 NOTE — PROGRESS NOTES
Jorge is an 86-year-old male who was radiated several years ago for prostate cancer.  His PSA is down to 0.03.  He denies any dysuria or hematuria.  He has a intermittent stream and double voids at night but has normal urination during the daytime.  He does drink some caffeine late in the day and enjoys chocolate- I suggested he change those habits to help decrease nighttime frequency.  He also wakes up at night because of pain from his arthritis  Other past medical history: Irritable bowel, colon polyps, macular changes, hyperlipidemia, Sjogren's syndrome, carpal tunnel  Medications: Tylenol, betamethasone cream, vitamin D, Avodart, fish oil, glucosamine, hydrocortisone cream, Plaquenil, prednisone, Flomax, baby aspirin  Allergies: None  Review of systems: As above  Urinalysis normal.  Postvoid residual 68 ml  Exam: Alert and oriented, normal appearance, normal vital signs.  Normal respirations, neuro grossly intact  Assessment: Stable PSA, nocturia as before  Plan: Avoid caffeine and chocolate.  See me in 6 months with PSA

## 2020-03-24 DIAGNOSIS — N40.1 BENIGN PROSTATIC HYPERPLASIA WITH WEAK URINARY STREAM: ICD-10-CM

## 2020-03-24 DIAGNOSIS — N40.0 BPH (BENIGN PROSTATIC HYPERPLASIA): ICD-10-CM

## 2020-03-24 DIAGNOSIS — R39.12 BENIGN PROSTATIC HYPERPLASIA WITH WEAK URINARY STREAM: ICD-10-CM

## 2020-03-24 RX ORDER — TAMSULOSIN HYDROCHLORIDE 0.4 MG/1
0.4 CAPSULE ORAL DAILY
Qty: 90 CAPSULE | Refills: 3 | Status: SHIPPED | OUTPATIENT
Start: 2020-03-24 | End: 2021-03-30

## 2020-03-24 RX ORDER — DUTASTERIDE 0.5 MG/1
0.5 CAPSULE, LIQUID FILLED ORAL DAILY
Qty: 90 CAPSULE | Refills: 3 | Status: SHIPPED | OUTPATIENT
Start: 2020-03-24 | End: 2020-05-08

## 2020-03-24 NOTE — TELEPHONE ENCOUNTER
Patient called nurse line and requested a script be sent in for dutasteride and tamsulosin to [facility]. Both RX's were refilled to patient's preferred pharmacy.     Carol Aiken LPN

## 2020-05-08 ENCOUNTER — VIRTUAL VISIT (OUTPATIENT)
Dept: INTERNAL MEDICINE | Facility: CLINIC | Age: 85
End: 2020-05-08
Payer: COMMERCIAL

## 2020-05-08 ENCOUNTER — ANCILLARY PROCEDURE (OUTPATIENT)
Dept: GENERAL RADIOLOGY | Facility: CLINIC | Age: 85
End: 2020-05-08
Attending: INTERNAL MEDICINE
Payer: COMMERCIAL

## 2020-05-08 VITALS
OXYGEN SATURATION: 96 % | TEMPERATURE: 97.2 F | SYSTOLIC BLOOD PRESSURE: 111 MMHG | BODY MASS INDEX: 26.75 KG/M2 | DIASTOLIC BLOOD PRESSURE: 71 MMHG | HEART RATE: 80 BPM | HEIGHT: 73 IN

## 2020-05-08 DIAGNOSIS — R06.09 DOE (DYSPNEA ON EXERTION): ICD-10-CM

## 2020-05-08 DIAGNOSIS — R06.01 ORTHOPNEA: ICD-10-CM

## 2020-05-08 DIAGNOSIS — R06.01 ORTHOPNEA: Primary | ICD-10-CM

## 2020-05-08 PROCEDURE — 99214 OFFICE O/P EST MOD 30 MIN: CPT | Performed by: INTERNAL MEDICINE

## 2020-05-08 PROCEDURE — 71046 X-RAY EXAM CHEST 2 VIEWS: CPT

## 2020-05-08 NOTE — PATIENT INSTRUCTIONS
Some features of your history sound like congestive heart failure, like the progressive shortness of breath with activity, and worsening when lying down flat.   No obvious congestive heart failure noted on physical exam or chest X-ray, however.   No obvious alternative cause identified either.     Recommend:  Echocardiogram.  Cardiology visit.     Someone will be contacting you to help schedule both of these.     See me back for a video or phone visit in six weeks.

## 2020-05-08 NOTE — NURSING NOTE
"/71 (BP Location: Left arm, Patient Position: Sitting, Cuff Size: Adult Large)   Pulse 80   Temp 97.2  F (36.2  C) (Oral)   Ht 1.842 m (6' 0.5\")   SpO2 96%   BMI 26.75 kg/m    shortness of breathe started 3-4 years has gotten progressively worse especially while laying down.  "

## 2020-05-08 NOTE — PROGRESS NOTES
"Subjective     Jorge Salomon is a 87 year old male who presents to clinic today for the following health issues:    HPI   The patient has been bothered by progressive shortness of breath when attempting to lie flat in his bed and sleep at night  worsening over the past 2 weeks.  He is not bothered by this during the day.  Symptoms might be improved a bit when he elevates his head with pillows.  He does not describe PND.  No dizziness palpitations or chest discomfort. No significant cough or fevers.     The patient does wear CPAP for sleep apnea    He describes having more slowly progressive dyspnea with activity over the past 3 to 4 years, for example, ascending about 30 feet from the leg to his cabin home.    He has done some reading and wonders whether he might have congestive heart failure.  He has not noted any weight gain, orthopnea during the daytime, or peripheral edema.  He reports having an echocardiogram about 10 years ago.  This record from 2009 is located and reviewed with him, showing normal left ventricular systolic function at that time.    Past medical, family and social histories as well as medications reviewed and updated as needed.    Reviewed and updated as needed this visit by Provider         Review of Systems   REVIEW OF SYSTEMS: The following systems have been completely reviewed and are negative except as noted above:   Constitutional, HEENT, respiratory, cardiovascular, gastrointestinal, musculoskeletal,  systems.        Objective    /71 (BP Location: Left arm, Patient Position: Sitting, Cuff Size: Adult Large)   Pulse 80   Temp 97.2  F (36.2  C) (Oral)   Ht 1.842 m (6' 0.5\")   SpO2 96%   BMI 26.75 kg/m    Body mass index is 26.75 kg/m .  Physical Exam   GENERAL: healthy, alert and no distress  EYES: Eyes grossly normal to inspection, PERRL and conjunctivae and sclerae normal  NECK: no adenopathy, no asymmetry, masses, or scars and thyroid normal to palpation  RESP: Faint " "inspiratory crackles in both posterior bases. Good air movement. Lungs otherwise clear to auscultation - no rales, rhonchi or wheezes  CV: Prominent intermittent gallop, likely an S4 gallop, but seems to vary with respiration.   Regular rate and rhythm, normal S1 S2, no definite S3, no murmur, click or rub, no peripheral edema.  MS: no gross musculoskeletal defects noted, no edema    Diagnostic Test Results:  Labs reviewed in [facility]  CXR -   CHEST TWO VIEWS 5/8/2020 1:45 PM      HISTORY: Orthopnea for one week, progressive CHISHOLM (dyspnea on exertion)  for 3-4 years. No cough or fever.     COMPARISON: 12/22/2014     FINDINGS: The lungs are mildly hyperexpanded but clear. No  pneumothorax or pleural effusion. Heart size normal.                                                                       IMPRESSION: No radiographic evidence of acute chest abnormality.      JANET PINEDA MD            Assessment & Plan     (R06.01) Orthopnea  (primary encounter diagnosis)  (R06.09) CHISHOLM (dyspnea on exertion)  Comment: Reviewed some differential diagnoses with the patient.  Some symptoms suspicious for congestive heart failure.  Some questionable findings on exam, no definite peripheral edema and no cardiomegaly effusions or interstitial prominence on x-ray.    Recommended echocardiogram and cardiology consult.  Will not use empiric diuretics at this time in the absence of weight gain or peripheral edema.  We could consider a pulmonary consult if no pathology noted with echocardiogram and/or cardiology consult.  Plan: XR Chest 2 Views, Echocardiogram Complete,         CARDIOLOGY EVAL ADULT REFERRAL                   BMI:   Estimated body mass index is 26.75 kg/m  as calculated from the following:    Height as of this encounter: 1.842 m (6' 0.5\").    Weight as of 2/27/20: 90.7 kg (200 lb).             Return in about 6 weeks (around 6/19/2020) for video visit.    Srinivasa eTllo MD,  [facility]        "

## 2020-05-11 PROBLEM — G47.33 OSA (OBSTRUCTIVE SLEEP APNEA): Status: ACTIVE | Noted: 2020-05-11

## 2020-05-11 PROBLEM — D49.59: Status: ACTIVE | Noted: 2020-05-11

## 2020-05-11 PROBLEM — I05.9 MITRAL VALVE DISORDER: Status: ACTIVE | Noted: 2020-05-11

## 2020-05-11 PROBLEM — I45.10 RBBB: Status: ACTIVE | Noted: 2020-05-11

## 2020-05-11 PROBLEM — I71.40 AAA (ABDOMINAL AORTIC ANEURYSM) (H): Status: ACTIVE | Noted: 2020-05-11

## 2020-05-11 PROBLEM — R06.09 DOE (DYSPNEA ON EXERTION): Status: ACTIVE | Noted: 2020-05-11

## 2020-05-11 PROBLEM — R06.01 ORTHOPNEA: Status: ACTIVE | Noted: 2020-05-11

## 2020-05-11 PROBLEM — J45.909 ASTHMA: Status: ACTIVE | Noted: 2020-05-11

## 2020-05-12 ENCOUNTER — HOSPITAL ENCOUNTER (OUTPATIENT)
Dept: CARDIOLOGY | Facility: CLINIC | Age: 85
Discharge: HOME | End: 2020-05-12
Attending: INTERNAL MEDICINE | Admitting: INTERNAL MEDICINE
Payer: COMMERCIAL

## 2020-05-12 DIAGNOSIS — R06.01 ORTHOPNEA: ICD-10-CM

## 2020-05-12 DIAGNOSIS — R06.09 DOE (DYSPNEA ON EXERTION): ICD-10-CM

## 2020-05-12 PROCEDURE — 25500064 ZZH RX 255 OP 636: Performed by: INTERNAL MEDICINE

## 2020-05-12 PROCEDURE — 93306 TTE W/DOPPLER COMPLETE: CPT | Mod: 26 | Performed by: INTERNAL MEDICINE

## 2020-05-12 PROCEDURE — 40000264 ECHOCARDIOGRAM COMPLETE

## 2020-05-12 RX ADMIN — HUMAN ALBUMIN MICROSPHERES AND PERFLUTREN 3 ML: 10; .22 INJECTION, SOLUTION INTRAVENOUS at 15:30

## 2020-05-13 ENCOUNTER — VIRTUAL VISIT (OUTPATIENT)
Dept: CARDIOLOGY | Facility: CLINIC | Age: 85
End: 2020-05-13
Attending: INTERNAL MEDICINE
Payer: COMMERCIAL

## 2020-05-13 DIAGNOSIS — R06.09 DOE (DYSPNEA ON EXERTION): ICD-10-CM

## 2020-05-13 DIAGNOSIS — R06.01 ORTHOPNEA: ICD-10-CM

## 2020-05-13 PROCEDURE — 99203 OFFICE O/P NEW LOW 30 MIN: CPT | Mod: TEL | Performed by: INTERNAL MEDICINE

## 2020-05-13 RX ORDER — FUROSEMIDE 20 MG
10 TABLET ORAL DAILY
Qty: 45 TABLET | Refills: 3 | Status: SHIPPED | OUTPATIENT
Start: 2020-05-13 | End: 2022-02-08

## 2020-05-13 NOTE — PROGRESS NOTES
"Jorge Salomon is a 87 year old male who is being evaluated via a billable telephone visit.      The patient has been notified of following:     \"This telephone visit will be conducted via a call between you and your physician/provider. We have found that certain health care needs can be provided without the need for a physical exam.  This service lets us provide the care you need with a short phone conversation.  If a prescription is necessary we can send it directly to your pharmacy.  If lab work is needed we can place an order for that and you can then stop by our lab to have the test done at a later time.    Telephone visits are billed at different rates depending on your insurance coverage. During this emergency period, for some insurers they may be billed the same as an in-person visit.  Please reach out to your insurance provider with any questions.    If during the course of the call the physician/provider feels a telephone visit is not appropriate, you will not be charged for this service.\"    Patient has given verbal consent for Telephone visit?  Yes    What phone number would you like to be contacted at? [phone]    How would you like to obtain your AVS? Mail a copy    Patient reported vitals:  BP: 111/71 (on 5/8)  Heart rate:  Weight: 194lb    Review Of Systems  Skin: negative  Eyes: negative  Ears/Nose/Throat: negative  Respiratory:  SOB when laying down Dyspnea on exertion-  Sleep Apnea  Cardiovascular: negative  Gastrointestinal: negative  Genitourinary: prostate  Musculoskeletal: negative  Neurologic: negative  Psychiatric: negative  Hematologic/Lymphatic/Immunologic: negative  Endocrine: negative    Hillary Burrell CMA 5/13/2020      Cardiology Consultation Note:  Physician location: Home office  Duration of phone visit: 22 minutes    ROS, PMH, PSurgHx, FamHx, SocHx, medication list reviewed in EMR.    Telephone Exam:  General: No audible distress.  Psych: Affect normal, no pressured speech or " flights of fancy.  Respiratory: Unlabored. Normal rate. No stridor or audible wheezing.          Interval History:     The patient is a very sharp, bright and inquisitive 87 year old former  with meticulous memory and details of past evaluation.  He is very accurate in his memory of dates and measurements.    He has a history of small abdominal aortic aneurysm around 3.2 cm last checked 2016.  He has aortic root enlargement at 4.7 cm and on ascending aortic enlargement at 4.3 cm on echocardiogram 5/12/2020.    He is being referred for dyspnea on exertion and orthopnea that he feels has been progressive over the past 17 years.  He has had previous evaluation for this that has been negative.    He states that being in his recliner, his breathing is improved.  It is worse when he lies horizontal.  Lying on his left side horizontal is a little worse than the other positions but sitting up is definitely better.    He denies any weight gain.  Echocardiogram has normal LV function and no significant valvular heart disease.    He does have a chronic right bundle branch block.  He has not been evaluated for arrhythmia recently but he was in sinus rhythm on his echocardiogram.    He denies any exertional chest discomfort.                      Assessment and Plan:         1. Orthopnea with some dyspnea on exertion, unknown etiology    Echocardiogram without structural abnormality to suggest significant systolic or diastolic dysfunction to cause his orthopnea.    We discussed that with his rheumatoid, there could be a component of pulmonary disease.  Regardless, it is reasonable to try a small dose of empiric diuretic help reduce internal pressures to see if this can help with his symptoms.    We will try furosemide 10 mg daily.  Patient does not have significant renal dysfunction historically.  We will set up a follow-up telephone visit with my nurse practitioners in a week or 2 to see how he has felt on the  furosemide.  We could increase it if he feels better or discontinue it entirely if he has no improvement.    We discussed that doing an electric monitor would also be reasonable given his right bundle branch block and first-degree AV block to rule out arrhythmia and conduction system disease as a contributing factor for his evening symptoms.  We did discuss that this is less likely a contributing factor to the orthopnea, but could be contributing to the dyspnea on exertion.    If symptoms worsen, it may also be reasonable to do ischemic evaluation for the dyspnea on exertion, note that ischemia with preserved ejection fraction would not typically cause orthopnea at rest.      This note was transcribed using electronic voice recognition software and there may be typographical errors present.     Hair Littlejohn MD

## 2020-05-13 NOTE — LETTER
5/13/2020      RE: Jorge Salomon  [address] [place] [address]       Dear Colleague,    Thank you for the opportunity to participate in the care of your patient, Jorge Salomon, at the [facility]. Please see a copy of my visit note below.          Interval History:     The patient is a very sharp, bright and inquisitive 87 year old former  with meticulous memory and details of past evaluation.  He is very accurate in his memory of dates and measurements.    He has a history of small abdominal aortic aneurysm around 3.2 cm last checked 2016.  He has aortic root enlargement at 4.7 cm and on ascending aortic enlargement at 4.3 cm on echocardiogram 5/12/2020.    He is being referred for dyspnea on exertion and orthopnea that he feels has been progressive over the past 17 years.  He has had previous evaluation for this that has been negative.    He states that being in his recliner, his breathing is improved.  It is worse when he lies horizontal.  Lying on his left side horizontal is a little worse than the other positions but sitting up is definitely better.    He denies any weight gain.  Echocardiogram has normal LV function and no significant valvular heart disease.    He does have a chronic right bundle branch block.  He has not been evaluated for arrhythmia recently but he was in sinus rhythm on his echocardiogram.    He denies any exertional chest discomfort.                    Assessment and Plan:         1. Orthopnea with some dyspnea on exertion, unknown etiology    Echocardiogram without structural abnormality to suggest significant systolic or diastolic dysfunction to cause his orthopnea.    We discussed that with his rheumatoid, there could be a component of pulmonary disease.  Regardless, it is reasonable to try a small dose of empiric diuretic help reduce internal pressures to see if this  can help with his symptoms.    We will try furosemide 10 mg daily.  Patient does not have significant renal dysfunction historically.  We will set up a follow-up telephone visit with my nurse practitioners in a week or 2 to see how he has felt on the furosemide.  We could increase it if he feels better or discontinue it entirely if he has no improvement.    We discussed that doing an electric monitor would also be reasonable given his right bundle branch block and first-degree AV block to rule out arrhythmia and conduction system disease as a contributing factor for his evening symptoms.  We did discuss that this is less likely a contributing factor to the orthopnea, but could be contributing to the dyspnea on exertion.    If symptoms worsen, it may also be reasonable to do ischemic evaluation for the dyspnea on exertion, note that ischemia with preserved ejection fraction would not typically cause orthopnea at rest.      This note was transcribed using electronic voice recognition software and there may be typographical errors present.        Please do not hesitate to contact me if you have any questions/concerns.     Sincerely,     Hair Littlejohn MD

## 2020-06-02 ENCOUNTER — VIRTUAL VISIT (OUTPATIENT)
Dept: CARDIOLOGY | Facility: CLINIC | Age: 85
End: 2020-06-02
Payer: COMMERCIAL

## 2020-06-02 VITALS
BODY MASS INDEX: 26.08 KG/M2 | DIASTOLIC BLOOD PRESSURE: 61 MMHG | SYSTOLIC BLOOD PRESSURE: 110 MMHG | HEART RATE: 72 BPM | WEIGHT: 195 LBS

## 2020-06-02 DIAGNOSIS — R06.01 ORTHOPNEA: ICD-10-CM

## 2020-06-02 DIAGNOSIS — R06.09 DOE (DYSPNEA ON EXERTION): ICD-10-CM

## 2020-06-02 PROCEDURE — 99213 OFFICE O/P EST LOW 20 MIN: CPT | Mod: 95 | Performed by: NURSE PRACTITIONER

## 2020-06-02 NOTE — LETTER
6/2/2020    Srinivasa Tello MD, MD  [address] [facility] [address]  [facility] [address]    RE: Jorge Salomon       Dear Colleague,    I had the pleasure of seeing Jorge Salomon in the [facility].    Jorge Salomon is a 87 year old male who is being evaluated via a billable telephone visit.      I had the pleasure of seeing Jorge Salomon today at [facility] for evaluation of orthopnea. He is a pleasant 87 year old patient of Dr. Littlejohn.    Mr. Salomon has a past medical history significant for aortic root dilatation 4.7 cm, mildly dilated ascending aorta 4.3 cm, obstructive sleep apnea with CPAP, RBBB, Sjogren's syndrome, and asthma.    He was recently referred to cardiology for dyspnea on exertion and orthopnea. Chest x-ray was normal. Echocardiogram showed a normal LV function and no significant valvular heart disease. He has a known chronic right bundle branch block.     He was seen by Dr. Littlejohn on 5/13/2020 for the orthopnea and CHISHOLM. He was started on furosemide 10 mg daily. An event monitor was recommended in the future to rule out arrhythmia and conduction disease. Dr. Littlejohn also recommended if his symptoms worsen, an ischemic evaluation should be considered.    Today presents for virtual cardiology visit to assess his orthopnea and dyspnea on exertion.  He notes it is somewhat improved since the initiation of Lasix and his weight is slowly downtrending however, he does not feel he is near baseline.  He denies peripheral edema and PND.    ROS:  12-pt ROS is negative except for as noted above.    No Physical Exam due to Telephone Visit    Assessment and Plan  1.  Orthopnea and dyspnea on exertion.  The patient was started on furosemide 10 mg daily 2 weeks ago for symptoms of orthopnea and PND.  He notes mild improvement however he is not to baseline.  We will increase his furosemide to 20 mg daily.  I have asked him to have a basic metabolic panel in 1 week  to assess his kidney function and monitor for dehydration.  We discussed a low-sodium diet.  If there is no improvement in his symptoms with the additional Lasix, we should consider an ischemic evaluation at that time.  I will call him with his lab results in 1 week.    Thank you for allowing me to care for Jorge Salomon today.    Phone duration: 9 minutes     This visit is being conducted as a virtual visit due to the emphasis on mitigation of the COVID-19 virus pandemic. The clinician has decided that the risk of an in-office visit outweighs the benefit for this patient.     SASHA Weiss, CNP  Cardiology    Voice recognition software was used for this note, I have reviewed this note, but errors may have been missed.    Orders Placed This Encounter   Procedures     Basic metabolic panel     No orders of the defined types were placed in this encounter.    There are no discontinued medications.      CURRENT MEDICATIONS:  Current Outpatient Medications   Medication Sig Dispense Refill     acetaminophen (TYLENOL) 500 MG tablet Take 1-2 tablets (500-1,000 mg) by mouth every 6 hours as needed for mild pain       ASPIRIN 81 MG OR TABS 1 TABLET DAILY       betamethasone dipropionate (DIPROSONE) 0.05 % external cream Apply topically 2 times daily       Cholecalciferol (VITAMIN D PO) Take  by mouth.       dutasteride (AVODART) 0.5 MG capsule Take 1 capsule (0.5 mg) by mouth daily 90 capsule 3     fish oil-omega-3 fatty acids (FISH OIL) 1000 MG capsule Take 2 g by mouth daily.       FLAXSEED, LINSEED, PO        furosemide (LASIX) 20 MG tablet Take 0.5 tablets (10 mg) by mouth daily 45 tablet 3     GLUCOSAMINE PO Take by mouth 2 times daily        hydrocortisone (CORTAID) 1 % cream Apply sparingly to affected area twice daily as needed. 30 g 1     hydroxychloroquine (PLAQUENIL) 200 MG tablet Take 2 tablets (400 mg) by mouth daily 180 tablet 3     predniSONE (DELTASONE) 2.5 MG tablet Take 2.5 mg by mouth daily        "tamsulosin (FLOMAX) 0.4 MG capsule Take 1 capsule (0.4 mg) by mouth daily Take 30 mins after a meal 90 capsule 3       ALLERGIES     Allergies   Allergen Reactions     No Known Allergies      Terazosin        PAST MEDICAL HISTORY:  Past Medical History:   Diagnosis Date     AAA (abdominal aortic aneurysm) (H) 5/11/2020    7/10/12 Focal ectasia of the distal abdominal aorta at the bifurcation is slightly larger measuring 2.7 x 2.4 cm, previously 2.2 x 2.1 cm when viewing prior exam. Focal outpouching posteriorly at this location previously (7/19/10).      Arthritis      Asthma 5/11/2020     Benign neoplasm of colon 6/98, 3/05    Hyperplastic polyp on both procedures.     Benign prostatic hyperplasia 1/16/2003     Problem list name updated by automated process. Provider to review and confirm     CHISHOLM (dyspnea on exertion) 5/11/2020     Dry eyes      Hernia, abdominal      Hyperlipidemia LDL goal <100 10/31/2010     Hypertrophy (benign) of prostate     hx of episodic prostatits     Internal hemorrhoids      Irritable bowel syndrome     IBS     Macular puckering of retina      Mitral valve disorder 5/11/2020     Mitral valve prolapse      Mumps      Orthopnea 5/11/2020     SHREYAS with use of CPAP 5/11/2020     Other disorders of vitreous     Vitreo-macular traction syndrome, left eye     Prostate cancer (H) 6/19/2009     Pure hypercholesterolemia      RBBB 5/11/2020     Sjogren's syndrome (H) 4/19/2018    Patient reports his ophthalmologist diagnosed him with this 30-40 years ago      Sleep apnea        PAST SURGICAL HISTORY:  Past Surgical History:   Procedure Laterality Date     COLONOSCOPY  6/98    one \"1/4 inch hyperplastic\" polyp.     COLONOSCOPY  3/2005    One hyperplastic polyp     CYSTOSCOPY       Full thickness macular hole, left eye  02/9/20008     HERNIORRHAPHY INGUINAL  7/9/2014    Procedure: HERNIORRHAPHY INGUINAL;  Surgeon: Reji Engle MD;  Location: [facility] OR     Left eye cataract  01/01/2000     Left " "vitreoretinal surgery  08     REMOVAL OF SPERM DUCT(S)  1970     Right eye Cataract  2012     VASECTOMY         FAMILY HISTORY:  Family History   Problem Relation Age of Onset     Heart Disease Mother          age 70. Had Heart Failure / inactive thyroid treatment cause cardiac failure     Thyroid Disease Mother         inactive thyroid, also \"2-3\" siblings     Prostate Cancer Father         Fatal Prostate CA,  age 80     Diabetes Brother         Born 1934.     Heart Disease Brother         Fatal MI, had diabetes.  age 82     Heart Disease Brother         Born 1917 (Jarrod). Has had pacemaker.     Heart Disease Sister         Born 192 (Kim), unspecified \"heart problems\"     Family History Negative Sister         Born  (Ayde).      Respiratory Brother          age 75, COPD/hip fracture/pneumonia       SOCIAL HISTORY:  Social History     Socioeconomic History     Marital status:      Spouse name: Hayde     Number of children: 3     Years of education: None     Highest education level: None   Occupational History     None   Social Needs     Financial resource strain: None     Food insecurity     Worry: None     Inability: None     Transportation needs     Medical: None     Non-medical: None   Tobacco Use     Smoking status: Never Smoker     Smokeless tobacco: Never Used   Substance and Sexual Activity     Alcohol use: No     Drug use: No     Sexual activity: Not Currently   Lifestyle     Physical activity     Days per week: None     Minutes per session: None     Stress: None   Relationships     Social connections     Talks on phone: None     Gets together: None     Attends Caodaism service: None     Active member of club or organization: None     Attends meetings of clubs or organizations: None     Relationship status: None     Intimate partner violence     Fear of current or ex partner: None     Emotionally abused: None     Physically abused: None     Forced sexual activity: " None   Other Topics Concern     Parent/sibling w/ CABG, MI or angioplasty before 65F 55M? 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     History of sanded plaster exposure in the 1970's-80's finishing his house and cabin. He took up some tile in his home about 1999 which he believes may have caused some asbestos exposure.         Patient has brought in a copy of a Health Care Directive identifying his wife Hayde Salomon as his health care agent, and his children Ovi, Srinivasa and Latoya as alternate health care agents.        Exercising at [facility] three days weekly.        Recent Lab Results:  LIPID RESULTS:  Lab Results   Component Value Date    CHOL 159 04/04/2019    HDL 42 04/04/2019     (H) 04/04/2019    TRIG 77 04/04/2019    CHOLHDLRATIO 3.0 05/30/2013       LIVER ENZYME RESULTS:  Lab Results   Component Value Date    AST 22 04/04/2019    ALT 26 04/04/2019       CBC RESULTS:  Lab Results   Component Value Date    WBC 5.1 04/04/2019    RBC 3.89 (L) 04/04/2019    HGB 14.0 04/04/2019    HCT 39.4 (L) 04/04/2019     (H) 04/04/2019    MCH 36.0 (H) 04/04/2019    MCHC 35.5 04/04/2019    RDW 12.9 04/04/2019     04/04/2019       BMP RESULTS:  Lab Results   Component Value Date     04/04/2019    POTASSIUM 4.4 04/04/2019    CHLORIDE 105 04/04/2019    CO2 29 04/04/2019    ANIONGAP 4 04/04/2019     (H) 04/04/2019    BUN 12 04/04/2019    CR 0.99 04/04/2019    GFRESTIMATED 69 04/04/2019    GFRESTBLACK 79 04/04/2019    MARILYN 9.4 04/04/2019        A1C RESULTS:  No results found for: A1C    INR RESULTS:  No results found for: INR        Thank you for allowing me to participate in the care of your patient.    Sincerely,     SASHA Weiss [facility]

## 2020-06-02 NOTE — PROGRESS NOTES
"Jorge Salomon is a 87 year old male who is being evaluated via a billable telephone visit.      The patient has been notified of following:     \"This telephone visit will be conducted via a call between you and your physician/provider. We have found that certain health care needs can be provided without the need for a physical exam.  This service lets us provide the care you need with a short phone conversation.  If a prescription is necessary we can send it directly to your pharmacy.  If lab work is needed we can place an order for that and you can then stop by our lab to have the test done at a later time.    Telephone visits are billed at different rates depending on your insurance coverage. During this emergency period, for some insurers they may be billed the same as an in-person visit.  Please reach out to your insurance provider with any questions.    If during the course of the call the physician/provider feels a telephone visit is not appropriate, you will not be charged for this service.\"    Patient has given verbal consent for Telephone visit?  Yes    What phone number would you like to be contacted at? [phone]    How would you like to obtain your AVS? Mail a copy     Review Of Systems  Skin: Negative  Eyes: Positive for glasses  Ears/Nose/Throat: Negative  Respiratory: Positive for dyspnea with laying down, orthopnea, sleep apnea - wears a CPAP  Cardiovascular: Negative  Gastrointestinal: Negative  Genitourinary: Negative  Musculoskeletal: Positive for rheumatoid arthritis  Neurologic:  Negative  Psychiatric: Positive for sleep disturbances due to arthritis pain  Hematologic/Lymphatic/Immunologic: Negative  Endocrine: Negative    Patient reported vitals:  BP: 110/61  Heart rate: 72  Weight: 195 lbs    Page Mckeon CMA    HPI and Plan:   I had the pleasure of seeing Jorge Salomon today at [facility] for evaluation of orthopnea. He is a pleasant 87 year old patient of " Dr. Littlejohn.    Mr. Salomon has a past medical history significant for aortic root dilatation 4.7 cm, mildly dilated ascending aorta 4.3 cm, obstructive sleep apnea with CPAP, RBBB, Sjogren's syndrome, and asthma.    He was recently referred to cardiology for dyspnea on exertion and orthopnea. Chest x-ray was normal. Echocardiogram showed a normal LV function and no significant valvular heart disease. He has a known chronic right bundle branch block.     He was seen by Dr. Littlejohn on 5/13/2020 for the orthopnea and CHISHOLM. He was started on furosemide 10 mg daily. An event monitor was recommended in the future to rule out arrhythmia and conduction disease. Dr. Littlejohn also recommended if his symptoms worsen, an ischemic evaluation should be considered.    Today presents for virtual cardiology visit to assess his orthopnea and dyspnea on exertion.  He notes it is somewhat improved since the initiation of Lasix and his weight is slowly downtrending however, he does not feel he is near baseline.  He denies peripheral edema and PND.    ROS:  12-pt ROS is negative except for as noted above.    No Physical Exam due to Telephone Visit    Assessment and Plan  1.  Orthopnea and dyspnea on exertion.  The patient was started on furosemide 10 mg daily 2 weeks ago for symptoms of orthopnea and PND.  He notes mild improvement however he is not to baseline.  We will increase his furosemide to 20 mg daily.  I have asked him to have a basic metabolic panel in 1 week to assess his kidney function and monitor for dehydration.  We discussed a low-sodium diet.  If there is no improvement in his symptoms with the additional Lasix, we should consider an ischemic evaluation at that time.  I will call him with his lab results in 1 week.    Thank you for allowing me to care for Jorge Salomon today.    Phone duration: 9 minutes     This visit is being conducted as a virtual visit due to the emphasis on mitigation of the COVID-19 virus pandemic. The  clinician has decided that the risk of an in-office visit outweighs the benefit for this patient.     SASHA Weiss, CNP  Cardiology    Voice recognition software was used for this note, I have reviewed this note, but errors may have been missed.    Orders Placed This Encounter   Procedures     Basic metabolic panel     No orders of the defined types were placed in this encounter.    There are no discontinued medications.      CURRENT MEDICATIONS:  Current Outpatient Medications   Medication Sig Dispense Refill     acetaminophen (TYLENOL) 500 MG tablet Take 1-2 tablets (500-1,000 mg) by mouth every 6 hours as needed for mild pain       ASPIRIN 81 MG OR TABS 1 TABLET DAILY       betamethasone dipropionate (DIPROSONE) 0.05 % external cream Apply topically 2 times daily       Cholecalciferol (VITAMIN D PO) Take  by mouth.       dutasteride (AVODART) 0.5 MG capsule Take 1 capsule (0.5 mg) by mouth daily 90 capsule 3     fish oil-omega-3 fatty acids (FISH OIL) 1000 MG capsule Take 2 g by mouth daily.       FLAXSEED, LINSEED, PO        furosemide (LASIX) 20 MG tablet Take 0.5 tablets (10 mg) by mouth daily 45 tablet 3     GLUCOSAMINE PO Take by mouth 2 times daily        hydrocortisone (CORTAID) 1 % cream Apply sparingly to affected area twice daily as needed. 30 g 1     hydroxychloroquine (PLAQUENIL) 200 MG tablet Take 2 tablets (400 mg) by mouth daily 180 tablet 3     predniSONE (DELTASONE) 2.5 MG tablet Take 2.5 mg by mouth daily       tamsulosin (FLOMAX) 0.4 MG capsule Take 1 capsule (0.4 mg) by mouth daily Take 30 mins after a meal 90 capsule 3       ALLERGIES     Allergies   Allergen Reactions     No Known Allergies      Terazosin        PAST MEDICAL HISTORY:  Past Medical History:   Diagnosis Date     AAA (abdominal aortic aneurysm) (H) 5/11/2020    7/10/12 Focal ectasia of the distal abdominal aorta at the bifurcation is slightly larger measuring 2.7 x 2.4 cm, previously 2.2 x 2.1 cm when viewing prior exam.  "Focal outpouching posteriorly at this location previously (7/19/10).      Arthritis      Asthma 2020     Benign neoplasm of colon , 3/05    Hyperplastic polyp on both procedures.     Benign prostatic hyperplasia 2003     Problem list name updated by automated process. Provider to review and confirm     CHISHOLM (dyspnea on exertion) 2020     Dry eyes      Hernia, abdominal      Hyperlipidemia LDL goal <100 10/31/2010     Hypertrophy (benign) of prostate     hx of episodic prostatits     Internal hemorrhoids      Irritable bowel syndrome     IBS     Macular puckering of retina      Mitral valve disorder 2020     Mitral valve prolapse      Mumps      Orthopnea 2020     SHREYAS with use of CPAP 2020     Other disorders of vitreous     Vitreo-macular traction syndrome, left eye     Prostate cancer (H) 2009     Pure hypercholesterolemia      RBBB 2020     Sjogren's syndrome (H) 2018    Patient reports his ophthalmologist diagnosed him with this 30-40 years ago      Sleep apnea        PAST SURGICAL HISTORY:  Past Surgical History:   Procedure Laterality Date     COLONOSCOPY      one \"1/4 inch hyperplastic\" polyp.     COLONOSCOPY  3/2005    One hyperplastic polyp     CYSTOSCOPY       Full thickness macular hole, left eye       HERNIORRHAPHY INGUINAL  2014    Procedure: HERNIORRHAPHY INGUINAL;  Surgeon: Reji Engle MD;  Location: [facility] OR     Left eye cataract  2000     Left vitreoretinal surgery  08     REMOVAL OF SPERM DUCT(S)  1970     Right eye Cataract  2012     VASECTOMY         FAMILY HISTORY:  Family History   Problem Relation Age of Onset     Heart Disease Mother          age 70. Had Heart Failure / inactive thyroid treatment cause cardiac failure     Thyroid Disease Mother         inactive thyroid, also \"2-3\" siblings     Prostate Cancer Father         Fatal Prostate CA,  age 80     Diabetes Brother         Born 1934.     Heart " "Disease Brother         Fatal MI, had diabetes.  age 82     Heart Disease Brother         Born 1917 (Jarrod). Has had pacemaker.     Heart Disease Sister         Born 1924 (Kim), unspecified \"heart problems\"     Family History Negative Sister         Born 191 (Ayde).      Respiratory Brother          age 75, COPD/hip fracture/pneumonia       SOCIAL HISTORY:  Social History     Socioeconomic History     Marital status:      Spouse name: Hayde     Number of children: 3     Years of education: None     Highest education level: None   Occupational History     None   Social Needs     Financial resource strain: None     Food insecurity     Worry: None     Inability: None     Transportation needs     Medical: None     Non-medical: None   Tobacco Use     Smoking status: Never Smoker     Smokeless tobacco: Never Used   Substance and Sexual Activity     Alcohol use: No     Drug use: No     Sexual activity: Not Currently   Lifestyle     Physical activity     Days per week: None     Minutes per session: None     Stress: None   Relationships     Social connections     Talks on phone: None     Gets together: None     Attends Worship service: None     Active member of club or organization: None     Attends meetings of clubs or organizations: None     Relationship status: None     Intimate partner violence     Fear of current or ex partner: None     Emotionally abused: None     Physically abused: None     Forced sexual activity: None   Other Topics Concern     Parent/sibling w/ CABG, MI or angioplasty before 65F 55M? 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     History of sanded plaster exposure in the 's-'s finishing his house and cabin. He took up some tile in his home about  which he believes may have caused some asbestos exposure.         Patient has brought in a copy of a Health Care Directive identifying his wife Hayde Salomon as his health care " agent, and his children Ovi, Srinivasa and Latoya as alternate health care agents.        Exercising at [employer] three days weekly.        Recent Lab Results:  LIPID RESULTS:  Lab Results   Component Value Date    CHOL 159 04/04/2019    HDL 42 04/04/2019     (H) 04/04/2019    TRIG 77 04/04/2019    CHOLHDLRATIO 3.0 05/30/2013       LIVER ENZYME RESULTS:  Lab Results   Component Value Date    AST 22 04/04/2019    ALT 26 04/04/2019       CBC RESULTS:  Lab Results   Component Value Date    WBC 5.1 04/04/2019    RBC 3.89 (L) 04/04/2019    HGB 14.0 04/04/2019    HCT 39.4 (L) 04/04/2019     (H) 04/04/2019    MCH 36.0 (H) 04/04/2019    MCHC 35.5 04/04/2019    RDW 12.9 04/04/2019     04/04/2019       BMP RESULTS:  Lab Results   Component Value Date     04/04/2019    POTASSIUM 4.4 04/04/2019    CHLORIDE 105 04/04/2019    CO2 29 04/04/2019    ANIONGAP 4 04/04/2019     (H) 04/04/2019    BUN 12 04/04/2019    CR 0.99 04/04/2019    GFRESTIMATED 69 04/04/2019    GFRESTBLACK 79 04/04/2019    MARILYN 9.4 04/04/2019        A1C RESULTS:  No results found for: A1C    INR RESULTS:  No results found for: INR        CC  Hair Littlejohn MD  [id number]  [place] [id number]

## 2020-06-15 DIAGNOSIS — R06.01 ORTHOPNEA: ICD-10-CM

## 2020-06-15 DIAGNOSIS — R06.09 DOE (DYSPNEA ON EXERTION): ICD-10-CM

## 2020-06-15 PROCEDURE — 80048 BASIC METABOLIC PNL TOTAL CA: CPT | Performed by: NURSE PRACTITIONER

## 2020-06-15 PROCEDURE — 36415 COLL VENOUS BLD VENIPUNCTURE: CPT | Performed by: NURSE PRACTITIONER

## 2020-06-16 LAB
ANION GAP SERPL CALCULATED.3IONS-SCNC: 6 MMOL/L (ref 3–14)
BUN SERPL-MCNC: 18 MG/DL (ref 7–30)
CALCIUM SERPL-MCNC: 8.9 MG/DL (ref 8.5–10.1)
CHLORIDE SERPL-SCNC: 105 MMOL/L (ref 94–109)
CO2 SERPL-SCNC: 26 MMOL/L (ref 20–32)
CREAT SERPL-MCNC: 1.05 MG/DL (ref 0.66–1.25)
GFR SERPL CREATININE-BSD FRML MDRD: 63 ML/MIN/{1.73_M2}
GLUCOSE SERPL-MCNC: 109 MG/DL (ref 70–99)
POTASSIUM SERPL-SCNC: 4.2 MMOL/L (ref 3.4–5.3)
SODIUM SERPL-SCNC: 137 MMOL/L (ref 133–144)

## 2020-07-17 ENCOUNTER — TRANSFERRED RECORDS (OUTPATIENT)
Dept: HEALTH INFORMATION MANAGEMENT | Facility: CLINIC | Age: 85
End: 2020-07-17

## 2020-08-04 DIAGNOSIS — C61 PROSTATE CANCER (H): ICD-10-CM

## 2020-08-04 PROCEDURE — 84153 ASSAY OF PSA TOTAL: CPT | Performed by: UROLOGY

## 2020-08-04 PROCEDURE — 36415 COLL VENOUS BLD VENIPUNCTURE: CPT | Performed by: UROLOGY

## 2020-08-05 LAB — PSA SERPL-MCNC: 0.02 UG/L (ref 0–4)

## 2020-08-06 ENCOUNTER — TRANSFERRED RECORDS (OUTPATIENT)
Dept: HEALTH INFORMATION MANAGEMENT | Facility: CLINIC | Age: 85
End: 2020-08-06

## 2020-08-10 ENCOUNTER — TELEPHONE (OUTPATIENT)
Dept: UROLOGY | Facility: CLINIC | Age: 85
End: 2020-08-10

## 2020-08-10 NOTE — TELEPHONE ENCOUNTER
Called to screen for CODVID-19 symptoms prior to tomorrow's visit.  Patient does not have any symptoms at this time.    Melani Noguera, EMT

## 2020-08-11 ENCOUNTER — OFFICE VISIT (OUTPATIENT)
Dept: UROLOGY | Facility: CLINIC | Age: 85
End: 2020-08-11
Payer: COMMERCIAL

## 2020-08-11 VITALS
DIASTOLIC BLOOD PRESSURE: 70 MMHG | HEIGHT: 73 IN | BODY MASS INDEX: 25.18 KG/M2 | SYSTOLIC BLOOD PRESSURE: 118 MMHG | WEIGHT: 190 LBS

## 2020-08-11 DIAGNOSIS — C61 PROSTATE CANCER (H): Primary | ICD-10-CM

## 2020-08-11 LAB
ALBUMIN UR-MCNC: NEGATIVE MG/DL
APPEARANCE UR: CLEAR
BILIRUB UR QL STRIP: NEGATIVE
COLOR UR AUTO: YELLOW
GLUCOSE UR STRIP-MCNC: NEGATIVE MG/DL
HGB UR QL STRIP: NEGATIVE
KETONES UR STRIP-MCNC: NEGATIVE MG/DL
LEUKOCYTE ESTERASE UR QL STRIP: NEGATIVE
NITRATE UR QL: NEGATIVE
PH UR STRIP: 6.5 PH (ref 5–7)
SOURCE: NORMAL
SP GR UR STRIP: 1.02 (ref 1–1.03)
UROBILINOGEN UR STRIP-ACNC: 0.2 EU/DL (ref 0.2–1)

## 2020-08-11 PROCEDURE — 81003 URINALYSIS AUTO W/O SCOPE: CPT | Mod: QW | Performed by: UROLOGY

## 2020-08-11 PROCEDURE — 99212 OFFICE O/P EST SF 10 MIN: CPT | Performed by: UROLOGY

## 2020-08-11 ASSESSMENT — MIFFLIN-ST. JEOR: SCORE: 1582.77

## 2020-08-11 ASSESSMENT — PAIN SCALES - GENERAL: PAINLEVEL: NO PAIN (0)

## 2020-08-11 NOTE — LETTER
8/11/2020       RE: Jorge Salomon  [address] [facility] [address]     Dear Colleague,    Thank you for referring your patient, Jorge Salomon, to the [facility] [place] [facility]. Please see a copy of my visit note below.    Jorge is an 87-year-old male who had grade 3 adenocarcinoma the prostate and was treated with external radiation 3 years ago.  His PSA is now 0.02.  He is having no trouble voiding, dysuria or hematuria.  Other past medical history: AAA, arthritis, asthma, colonic polyps, orthopnea-has resolved, dry eyes, hernia, hyperlipidemia, hemorrhoids, IBS, retinal puckering, mitral valve prolapse, sleep apnea, right bundle branch block, Sjogren's syndrome, non-smoker.  Medications: No change in the last 6 months-no longer taking Lasix 10 mg daily  Allergies: None  Exam: Alert and oriented, normal appearance, normal respirations.  Normal vital signs, neuro grossly intact  Assessment: Grade 3, clinical stage T2 a adenocarcinoma the prostate- doing well after radiation  Plan: See yearly with PSA-he will see Dr. Plunkett.    Again, thank you for allowing me to participate in the care of your patient.      Sincerely,    Marek Miles MD

## 2020-08-11 NOTE — PROGRESS NOTES
Jorge is an 87-year-old male who had grade 3 adenocarcinoma the prostate and was treated with external radiation 3 years ago.  His PSA is now 0.02.  He is having no trouble voiding, dysuria or hematuria.  Other past medical history: AAA, arthritis, asthma, colonic polyps, orthopnea-has resolved, dry eyes, hernia, hyperlipidemia, hemorrhoids, IBS, retinal puckering, mitral valve prolapse, sleep apnea, right bundle branch block, Sjogren's syndrome, non-smoker.  Medications: No change in the last 6 months-no longer taking Lasix 10 mg daily  Allergies: None  Exam: Alert and oriented, normal appearance, normal respirations.  Normal vital signs, neuro grossly intact  Assessment: Grade 3, clinical stage T2 a adenocarcinoma the prostate- doing well after radiation  Plan: See yearly with PSA-he will see Dr. Plunkett.

## 2020-08-26 ENCOUNTER — TRANSFERRED RECORDS (OUTPATIENT)
Dept: HEALTH INFORMATION MANAGEMENT | Facility: CLINIC | Age: 85
End: 2020-08-26

## 2020-09-18 ENCOUNTER — TRANSFERRED RECORDS (OUTPATIENT)
Dept: HEALTH INFORMATION MANAGEMENT | Facility: CLINIC | Age: 85
End: 2020-09-18

## 2020-09-28 ENCOUNTER — TRANSFERRED RECORDS (OUTPATIENT)
Dept: HEALTH INFORMATION MANAGEMENT | Facility: CLINIC | Age: 85
End: 2020-09-28

## 2020-11-08 ENCOUNTER — HEALTH MAINTENANCE LETTER (OUTPATIENT)
Age: 85
End: 2020-11-08

## 2020-12-14 ENCOUNTER — TRANSFERRED RECORDS (OUTPATIENT)
Dept: HEALTH INFORMATION MANAGEMENT | Facility: CLINIC | Age: 85
End: 2020-12-14

## 2020-12-14 LAB
ALT SERPL-CCNC: 16 IU/L (ref 5–40)
AST SERPL-CCNC: 18 U/L (ref 5–34)
CREAT SERPL-MCNC: 0.95 MG/DL (ref 0.5–1.3)

## 2021-03-28 ENCOUNTER — HEALTH MAINTENANCE LETTER (OUTPATIENT)
Age: 86
End: 2021-03-28

## 2021-03-29 DIAGNOSIS — N40.1 BENIGN PROSTATIC HYPERPLASIA WITH WEAK URINARY STREAM: ICD-10-CM

## 2021-03-29 DIAGNOSIS — R39.12 BENIGN PROSTATIC HYPERPLASIA WITH WEAK URINARY STREAM: ICD-10-CM

## 2021-03-29 DIAGNOSIS — C61 PROSTATE CANCER (H): ICD-10-CM

## 2021-03-30 RX ORDER — DUTASTERIDE 0.5 MG/1
CAPSULE, LIQUID FILLED ORAL
Qty: 90 CAPSULE | Refills: 1 | Status: SHIPPED | OUTPATIENT
Start: 2021-03-30 | End: 2021-10-18

## 2021-03-30 RX ORDER — TAMSULOSIN HYDROCHLORIDE 0.4 MG/1
CAPSULE ORAL
Qty: 90 CAPSULE | Refills: 1 | Status: SHIPPED | OUTPATIENT
Start: 2021-03-30 | End: 2021-10-18

## 2021-05-19 ENCOUNTER — OFFICE VISIT (OUTPATIENT)
Dept: INTERNAL MEDICINE | Facility: CLINIC | Age: 86
End: 2021-05-19
Payer: COMMERCIAL

## 2021-05-19 VITALS
OXYGEN SATURATION: 96 % | RESPIRATION RATE: 18 BRPM | BODY MASS INDEX: 26.88 KG/M2 | TEMPERATURE: 98.7 F | DIASTOLIC BLOOD PRESSURE: 70 MMHG | SYSTOLIC BLOOD PRESSURE: 115 MMHG | HEIGHT: 73 IN | HEART RATE: 98 BPM | WEIGHT: 202.8 LBS

## 2021-05-19 DIAGNOSIS — I71.40 ABDOMINAL AORTIC ANEURYSM (AAA) WITHOUT RUPTURE (H): ICD-10-CM

## 2021-05-19 DIAGNOSIS — R53.83 FATIGUE, UNSPECIFIED TYPE: ICD-10-CM

## 2021-05-19 DIAGNOSIS — Z00.00 ENCOUNTER FOR MEDICARE ANNUAL WELLNESS EXAM: Primary | ICD-10-CM

## 2021-05-19 DIAGNOSIS — R06.09 DYSPNEA ON EXERTION: ICD-10-CM

## 2021-05-19 DIAGNOSIS — C61 PROSTATE CANCER (H): ICD-10-CM

## 2021-05-19 DIAGNOSIS — M06.09 RHEUMATOID ARTHRITIS, SERONEGATIVE, MULTIPLE SITES (H): ICD-10-CM

## 2021-05-19 DIAGNOSIS — Z13.6 CARDIOVASCULAR SCREENING; LDL GOAL LESS THAN 130: ICD-10-CM

## 2021-05-19 DIAGNOSIS — R23.3 EASY BRUISING: ICD-10-CM

## 2021-05-19 DIAGNOSIS — Z79.899 MEDICATION MANAGEMENT: ICD-10-CM

## 2021-05-19 DIAGNOSIS — D72.821 MONOCYTOSIS: ICD-10-CM

## 2021-05-19 LAB
ALBUMIN SERPL-MCNC: 3.7 G/DL (ref 3.4–5)
ALP SERPL-CCNC: 47 U/L (ref 40–150)
ALT SERPL W P-5'-P-CCNC: 20 U/L (ref 0–70)
ANION GAP SERPL CALCULATED.3IONS-SCNC: 1 MMOL/L (ref 3–14)
AST SERPL W P-5'-P-CCNC: 16 U/L (ref 0–45)
BILIRUB SERPL-MCNC: 0.7 MG/DL (ref 0.2–1.3)
BUN SERPL-MCNC: 16 MG/DL (ref 7–30)
CALCIUM SERPL-MCNC: 8.8 MG/DL (ref 8.5–10.1)
CHLORIDE SERPL-SCNC: 106 MMOL/L (ref 94–109)
CHOLEST SERPL-MCNC: 141 MG/DL
CO2 SERPL-SCNC: 33 MMOL/L (ref 20–32)
CREAT SERPL-MCNC: 1.03 MG/DL (ref 0.66–1.25)
GFR SERPL CREATININE-BSD FRML MDRD: 64 ML/MIN/{1.73_M2}
GLUCOSE SERPL-MCNC: 88 MG/DL (ref 70–99)
HDLC SERPL-MCNC: 51 MG/DL
LDLC SERPL CALC-MCNC: 79 MG/DL
NONHDLC SERPL-MCNC: 90 MG/DL
POTASSIUM SERPL-SCNC: 4 MMOL/L (ref 3.4–5.3)
PROT SERPL-MCNC: 7 G/DL (ref 6.8–8.8)
SODIUM SERPL-SCNC: 140 MMOL/L (ref 133–144)
TRIGL SERPL-MCNC: 56 MG/DL
TSH SERPL DL<=0.005 MIU/L-ACNC: 2.35 MU/L (ref 0.4–4)

## 2021-05-19 PROCEDURE — 80053 COMPREHEN METABOLIC PANEL: CPT | Performed by: INTERNAL MEDICINE

## 2021-05-19 PROCEDURE — 99214 OFFICE O/P EST MOD 30 MIN: CPT | Mod: 25 | Performed by: INTERNAL MEDICINE

## 2021-05-19 PROCEDURE — 80061 LIPID PANEL: CPT | Performed by: INTERNAL MEDICINE

## 2021-05-19 PROCEDURE — 84443 ASSAY THYROID STIM HORMONE: CPT | Performed by: INTERNAL MEDICINE

## 2021-05-19 PROCEDURE — 36415 COLL VENOUS BLD VENIPUNCTURE: CPT | Performed by: INTERNAL MEDICINE

## 2021-05-19 PROCEDURE — G0438 PPPS, INITIAL VISIT: HCPCS | Performed by: INTERNAL MEDICINE

## 2021-05-19 PROCEDURE — 85025 COMPLETE CBC W/AUTO DIFF WBC: CPT | Performed by: INTERNAL MEDICINE

## 2021-05-19 SDOH — ECONOMIC STABILITY: FOOD INSECURITY: WITHIN THE PAST 12 MONTHS, YOU WORRIED THAT YOUR FOOD WOULD RUN OUT BEFORE YOU GOT MONEY TO BUY MORE.: NEVER TRUE

## 2021-05-19 SDOH — SOCIAL STABILITY: SOCIAL NETWORK: IN A TYPICAL WEEK, HOW MANY TIMES DO YOU TALK ON THE PHONE WITH FAMILY, FRIENDS, OR NEIGHBORS?: NOT ASKED

## 2021-05-19 SDOH — ECONOMIC STABILITY: INCOME INSECURITY: HOW HARD IS IT FOR YOU TO PAY FOR THE VERY BASICS LIKE FOOD, HOUSING, MEDICAL CARE, AND HEATING?: NOT HARD AT ALL

## 2021-05-19 SDOH — SOCIAL STABILITY: SOCIAL NETWORK: HOW OFTEN DO YOU ATTENT MEETINGS OF THE CLUB OR ORGANIZATION YOU BELONG TO?: NOT ASKED

## 2021-05-19 SDOH — SOCIAL STABILITY: SOCIAL INSECURITY: WITHIN THE LAST YEAR, HAVE YOU BEEN AFRAID OF YOUR PARTNER OR EX-PARTNER?: NO

## 2021-05-19 SDOH — ECONOMIC STABILITY: TRANSPORTATION INSECURITY
IN THE PAST 12 MONTHS, HAS LACK OF TRANSPORTATION KEPT YOU FROM MEETINGS, WORK, OR FROM GETTING THINGS NEEDED FOR DAILY LIVING?: NO

## 2021-05-19 SDOH — SOCIAL STABILITY: SOCIAL NETWORK
DO YOU BELONG TO ANY CLUBS OR ORGANIZATIONS SUCH AS CHURCH GROUPS UNIONS, FRATERNAL OR ATHLETIC GROUPS, OR SCHOOL GROUPS?: NOT ASKED

## 2021-05-19 SDOH — SOCIAL STABILITY: SOCIAL INSECURITY
WITHIN THE LAST YEAR, HAVE YOU BEEN KICKED, HIT, SLAPPED, OR OTHERWISE PHYSICALLY HURT BY YOUR PARTNER OR EX-PARTNER?: NO

## 2021-05-19 SDOH — ECONOMIC STABILITY: TRANSPORTATION INSECURITY
IN THE PAST 12 MONTHS, HAS THE LACK OF TRANSPORTATION KEPT YOU FROM MEDICAL APPOINTMENTS OR FROM GETTING MEDICATIONS?: NO

## 2021-05-19 SDOH — SOCIAL STABILITY: SOCIAL NETWORK: HOW OFTEN DO YOU ATTEND CHURCH OR RELIGIOUS SERVICES?: NOT ASKED

## 2021-05-19 SDOH — SOCIAL STABILITY: SOCIAL NETWORK: ARE YOU MARRIED, WIDOWED, DIVORCED, SEPARATED, NEVER MARRIED, OR LIVING WITH A PARTNER?: MARRIED

## 2021-05-19 SDOH — SOCIAL STABILITY: SOCIAL INSECURITY: WITHIN THE LAST YEAR, HAVE YOU BEEN HUMILIATED OR EMOTIONALLY ABUSED IN OTHER WAYS BY YOUR PARTNER OR EX-PARTNER?: NO

## 2021-05-19 SDOH — SOCIAL STABILITY: SOCIAL INSECURITY
WITHIN THE LAST YEAR, HAVE TO BEEN RAPED OR FORCED TO HAVE ANY KIND OF SEXUAL ACTIVITY BY YOUR PARTNER OR EX-PARTNER?: NO

## 2021-05-19 SDOH — ECONOMIC STABILITY: FOOD INSECURITY: WITHIN THE PAST 12 MONTHS, THE FOOD YOU BOUGHT JUST DIDN'T LAST AND YOU DIDN'T HAVE MONEY TO GET MORE.: NEVER TRUE

## 2021-05-19 SDOH — SOCIAL STABILITY: SOCIAL NETWORK: HOW OFTEN DO YOU GET TOGETHER WITH FRIENDS OR RELATIVES?: NOT ASKED

## 2021-05-19 ASSESSMENT — ENCOUNTER SYMPTOMS
SHORTNESS OF BREATH: 1
ARTHRALGIAS: 1
MYALGIAS: 1

## 2021-05-19 ASSESSMENT — MIFFLIN-ST. JEOR: SCORE: 1635.83

## 2021-05-19 ASSESSMENT — ACTIVITIES OF DAILY LIVING (ADL): CURRENT_FUNCTION: NO ASSISTANCE NEEDED

## 2021-05-19 NOTE — PROGRESS NOTES
"SUBJECTIVE:   Jorge Salomon is a 88 year old male who presents for Preventive Visit.     Patient has been advised of split billing requirements and indicates understanding: Yes   Are you in the first 12 months of your Medicare coverage?  No    Healthy Habits:     In general, how would you rate your overall health?  Fair    Frequency of exercise:  4-5 days/week    Do you usually eat at least 4 servings of fruit and vegetables a day, include whole grains    & fiber and avoid regularly eating high fat or \"junk\" foods?  No    Taking medications regularly:  Yes    Medication side effects:  Muscle aches    Ability to successfully perform activities of daily living:  No assistance needed    Home Safety:  No safety concerns identified    Hearing Impairment:  No hearing concerns    In the past 6 months, have you been bothered by leaking of urine?  No    In general, how would you rate your overall mental or emotional health?  Good      PHQ-2 Total Score: 0    Additional concerns today:  Yes    Do you feel safe in your environment? Yes    Have you ever done Advance Care Planning? (For example, a Health Directive, POLST, or a discussion with a medical provider or your loved ones about your wishes): Yes, patient states has an Advance Care Planning document and will bring a copy to the clinic.     Fall risk  Fallen 2 or more times in the past year?: No  Any fall with injury in the past year?: No  click delete button to remove this line now  Cognitive Screening   1) Repeat 3 items (Leader, Season, Table)    2) Clock draw: NORMAL  3) 3 item recall: Recalls 3 objects  Results: 3 items recalled: COGNITIVE IMPAIRMENT LESS LIKELY    Mini-CogTM Copyright PRANAV Forbes. Licensed by the author for use in [facility]; reprinted with permission (kathy@.[facility]). All rights reserved.      Do you have sleep apnea, excessive snoring or daytime drowsiness?: yes    Reviewed and updated as needed this visit by clinical staff             " "    Reviewed and updated as needed this visit by Provider                Social History     Tobacco Use     Smoking status: Never Smoker     Smokeless tobacco: Never Used   Substance Use Topics     Alcohol use: No     If you drink alcohol do you typically have >3 drinks per day or >7 drinks per week? No    Alcohol Use 5/19/2021   Prescreen: >3 drinks/day or >7 drinks/week? Not Applicable   No flowsheet data found.        PROBLEMS TO ADD ON...In addition to an Annual Wellness Exam, we addressed dyspnea with exertion, easy bruising, previous monocytosis on prior WBC differential tests, h/o AAA, fatigue.     He feels short of breath with exertion which she has noted for over 15 years. He has also followed with cardiology, seeing Dr. Littlejohn in May of last year.  He was treated with Lasix 10 mg at a time which he felt caused little benefit. An echocardiogram showed a good left ventricular ejection fraction. He has noted a bit of fatigue.     He feels as though he bruises easily.  He has had somewhat disproportionately elevated monocyte counts on previous white blood cell differential tests.    The patient follows with rheumatology with Dr. Sahu for seronegative rheumatoid arthritis, currently treated with Plaquenil and prednisone.    The patient has longstanding prostate cancer diagnosed originally in 2008.  He has recently followed with Urology and with Dr. Whittington of oncology.  He completed radiation therapy in May 2017 and his most recent PSA was very low.  He takes Avodart and Flomax for this.    The patient has sleep apnea for which she is taking CPAP.  He has \"minor eczema\" followed by his dermatologist and treated with betamethasone.  He has also followed with cardiology, seeing Dr. Littlejohn in May of last year.  He was treated with Lasix 10 mg at a time which he felt caused little benefit.    Current providers sharing in care for this patient include:   Patient Care Team:  Srinivasa Tello MD as PCP - General  Elisha, " "Srinivasa PUENTES MD as Assigned PCP  Hair Littlejohn MD as Assigned Heart and Vascular Provider  Marek Miles MD as Assigned Surgical Provider    The following health maintenance items are reviewed in Epic and correct as of today:  Health Maintenance Due   Topic Date Due     ANNUAL REVIEW OF  ORDERS  Never done     ASTHMA ACTION PLAN  Never done     ASTHMA CONTROL TEST  Never done     Pneumococcal Vaccine: 65+ Years (1 of 2 - PPSV23) Never done     ZOSTER IMMUNIZATION (1 of 2) Never done     MEDICARE ANNUAL WELLNESS VISIT  04/04/2009     COVID-19 Vaccine (2 - Pfizer 2-dose series) 03/15/2021     FALL RISK ASSESSMENT  05/08/2021     Pneumonia Vaccine: deferred    Pertinent mammograms are reviewed under the imaging tab.    Review of Systems   Respiratory: Positive for shortness of breath.    Genitourinary: Negative for impotence.   Musculoskeletal: Positive for arthralgias and myalgias.      REVIEW OF SYSTEMS: The following systems have been completely reviewed and are negative except as noted above:   Constitutional, HEENT, respiratory, cardiovascular, gastrointestinal, genitourinary, musculoskeletal, dermatologic, hematologic, endocrine, psychiatric, and neurologic systems.      OBJECTIVE:   Vitals: /70 (BP Location: Left arm, Patient Position: Sitting, Cuff Size: Adult Large)   Pulse 98   Temp 98.7  F (37.1  C) (Oral)   Resp 18   Ht 1.842 m (6' 0.5\")   Wt 92 kg (202 lb 12.8 oz)   SpO2 96%   BMI 27.13 kg/m    BMI= Body mass index is 27.13 kg/m .    Physical Exam  GENERAL: healthy, alert and no distress  EYES: Eyes grossly normal to inspection, PERRL and conjunctivae and sclerae normal  HENT: ear canals and TM's normal, nose and mouth without ulcers or lesions  NECK: no adenopathy, no asymmetry, masses, or scars and thyroid normal to palpation  RESP: lungs clear to auscultation - no rales, rhonchi or wheezes  CV: regular rate and rhythm, normal S1 S2, no S3 or S4, no murmur, click or rub, no " peripheral edema and peripheral pulses strong  ABDOMEN: soft, nontender, no hepatosplenomegaly, no masses and bowel sounds normal  MS: no gross musculoskeletal defects noted, no edema  SKIN: no suspicious lesions or rashes  NEURO: Normal strength and tone, mentation intact and speech normal  PSYCH: mentation appears normal, affect normal/bright    Diagnostic Test Results:  Labs reviewed in Epic    ASSESSMENT / PLAN:   (Z00.00) Encounter for Medicare annual wellness exam  (primary encounter diagnosis)  Comment: Stable health. See epic orders.     (R06.00) Dyspnea on exertion  Comment: See pt instructions and epic orders.   Plan: OFFICE/OUTPT VISIT,EST,LEVL IV          (R23.8) Easy bruising  Comment: Will check CBC and differential. May discuss with Dr Whittington as this would be within her area of expertise.   Plan: OFFICE/OUTPT VISIT,EST,LEVL IV          (D72.821) Monocytosis  Comment: Check a differential along with CBC. See pt instructions and epic orders.   Plan: OFFICE/OUTPT VISIT,EST,LEVL IV, CBC with         platelets and differential          (I71.4) Abdominal aortic aneurysm (AAA) without rupture (H)  Comment: Follow with cardiology as they advise.   Plan: OFFICE/OUTPT VISIT,EST,LEVL IV          (R53.83) Fatigue, unspecified type  Comment: See pt instructions and epic orders.   Plan: OFFICE/OUTPT VISIT,EST,LEVL IV, Comprehensive         metabolic panel, TSH with free T4 reflex, CBC         with platelets          (M06.09) Rheumatoid arthritis, seronegative, multiple sites (H)  Comment: Continue current meds and f/u with Rheumatology as they advise.     (C61) Prostate cancer (H)  Comment: Continue current meds and specialty f/u as they advise.     (Z79.899) Medication management  Plan: Comprehensive metabolic panel          (Z13.6) CARDIOVASCULAR SCREENING; LDL GOAL LESS THAN 130  Plan: Comprehensive metabolic panel, Lipid panel         reflex to direct LDL Fasting            Patient has been advised of split  "billing requirements and indicates understanding: Yes  COUNSELING:  Reviewed preventive health counseling, as reflected in patient instructions    Estimated body mass index is 25.41 kg/m  as calculated from the following:    Height as of 8/11/20: 1.842 m (6' 0.5\").    Weight as of 8/11/20: 86.2 kg (190 lb).        He reports that he has never smoked. He has never used smokeless tobacco.      Appropriate preventive services were discussed with this patient, including applicable screening as appropriate for cardiovascular disease, diabetes, osteopenia/osteoporosis, and glaucoma.  As appropriate for age/gender, discussed screening for colorectal cancer, prostate cancer, breast cancer, and cervical cancer. Checklist reviewing preventive services available has been given to the patient.    Reviewed patients plan of care and provided an AVS. The Basic Care Plan (routine screening as documented in Health Maintenance) for Jorge meets the Care Plan requirement. This Care Plan has been established and reviewed with the Patient.    Counseling Resources:  ATP IV Guidelines  Pooled Cohorts Equation Calculator  Breast Cancer Risk Calculator  Breast Cancer: Medication to Reduce Risk  FRAX Risk Assessment  ICSI Preventive Guidelines  Dietary Guidelines for Americans, 2010  [employer] MyPlate  ASA Prophylaxis  Lung CA Screening    Srinivasa Tello MD,   [facility]    Patient Instructions     Shortness of breath may be related to gradual deconditioning over years. No obvious cardiac or lung cause noted. You might see if Dr Madison suggests anything when you see her next regarding sleep apnea. We could also consider rechecking an echocardiogram in another year, but heart function was excellent a year ago.     We will recheck your monocyte count with your complete blood count and differential today.   Bone marrow biopsy would be the last word if you were interested, but I would not myself feel as though it is " necessary.     Otherwise everything looks fine!    Keep seeing the specialists as they recommend.    Lab results will be available soon on MyChart.    See me in a year, sooner if problems.

## 2021-05-19 NOTE — PATIENT INSTRUCTIONS
Patient Education   Personalized Prevention Plan  You are due for the preventive services outlined below.  Your care team is available to assist you in scheduling these services.  If you have already completed any of these items, please share that information with your care team to update in your medical record.  Health Maintenance Due   Topic Date Due     ANNUAL REVIEW OF HM ORDERS  Never done     Asthma Action Plan - yearly  Never done     Asthma Control Test  Never done     Zoster (Shingles) Vaccine (1 of 2) Never done     Pneumococcal Vaccine (1 of 2 - PPSV23) 02/26/1999     COVID-19 Vaccine (2 - Pfizer 2-dose series) 03/15/2021     FALL RISK ASSESSMENT  05/08/2021           Shortness of breath may be related to gradual deconditioning over years. No obvious cardiac or lung cause noted. You might see if Dr Madison suggests anything when you see her next regarding sleep apnea. We could also consider rechecking an echocardiogram in another year, but heart function was excellent a year ago.     We will recheck your monocyte count with your complete blood count and differential today.   Bone marrow biopsy would be the last word if you were interested, but I would not myself feel as though it is necessary.     Otherwise everything looks fine!    Keep seeing the specialists as they recommend.    Lab results will be available soon on [facility].    See me in a year, sooner if problems.

## 2021-05-19 NOTE — NURSING NOTE
"/70 (BP Location: Left arm, Patient Position: Sitting, Cuff Size: Adult Large)   Pulse 98   Temp 98.7  F (37.1  C) (Oral)   Resp 18   Ht 1.842 m (6' 0.5\")   Wt 92 kg (202 lb 12.8 oz)   SpO2 96%   BMI 27.13 kg/m      "

## 2021-05-20 ENCOUNTER — MYC MEDICAL ADVICE (OUTPATIENT)
Dept: INTERNAL MEDICINE | Facility: CLINIC | Age: 86
End: 2021-05-20

## 2021-05-21 LAB
BASOPHILS # BLD AUTO: 0 10E9/L (ref 0–0.2)
BASOPHILS NFR BLD AUTO: 0 %
DIFFERENTIAL METHOD BLD: ABNORMAL
EOSINOPHIL # BLD AUTO: 0.1 10E9/L (ref 0–0.7)
EOSINOPHIL NFR BLD AUTO: 2.3 %
ERYTHROCYTE [DISTWIDTH] IN BLOOD BY AUTOMATED COUNT: 12.8 % (ref 10–15)
ERYTHROCYTE [DISTWIDTH] IN BLOOD BY AUTOMATED COUNT: NORMAL % (ref 10–15)
HCT VFR BLD AUTO: 39.8 % (ref 40–53)
HCT VFR BLD AUTO: NORMAL % (ref 40–53)
HGB BLD-MCNC: 13.5 G/DL (ref 13.3–17.7)
HGB BLD-MCNC: NORMAL G/DL (ref 13.3–17.7)
LYMPHOCYTES # BLD AUTO: 0.5 10E9/L (ref 0.8–5.3)
LYMPHOCYTES NFR BLD AUTO: 16.9 %
MCH RBC QN AUTO: 35.9 PG (ref 26.5–33)
MCH RBC QN AUTO: NORMAL PG (ref 26.5–33)
MCHC RBC AUTO-ENTMCNC: 33.9 G/DL (ref 31.5–36.5)
MCHC RBC AUTO-ENTMCNC: NORMAL G/DL (ref 31.5–36.5)
MCV RBC AUTO: 106 FL (ref 78–100)
MCV RBC AUTO: NORMAL FL (ref 78–100)
MONOCYTES # BLD AUTO: 0.6 10E9/L (ref 0–1.3)
MONOCYTES NFR BLD AUTO: 19.8 %
NEUTROPHILS # BLD AUTO: 1.9 10E9/L (ref 1.6–8.3)
NEUTROPHILS NFR BLD AUTO: 61 %
PLATELET # BLD AUTO: 138 10E9/L (ref 150–450)
PLATELET # BLD AUTO: NORMAL 10E9/L (ref 150–450)
RBC # BLD AUTO: 3.76 10E12/L (ref 4.4–5.9)
RBC # BLD AUTO: NORMAL 10E12/L (ref 4.4–5.9)
WBC # BLD AUTO: 3.1 10E9/L (ref 4–11)
WBC # BLD AUTO: NORMAL 10E9/L (ref 4–11)

## 2021-05-21 NOTE — PROGRESS NOTES
"Please see myc message from today regarding CBC with Diff.  Patient wanting to get the differential numbers.      Called lab and they have the blood still and can add on the test.    Test pended.  Please sign if appropriate.  Thanks    Answers for HPI/ROS submitted by the patient on 5/19/2021   Annual Exam:  In general, how would you rate your overall physical health?: fair  Frequency of exercise:: 4-5 days/week  Do you usually eat at least 4 servings of fruit and vegetables a day, include whole grains & fiber, and avoid regularly eating high fat or \"junk\" foods? : No  Taking medications regularly:: Yes  Medication side effects:: Muscle aches  Activities of Daily Living: no assistance needed  Home safety: no safety concerns identified  Hearing Impairment:: no hearing concerns  In the past 6 months, have you been bothered by leaking of urine?: No  arthralgias: Yes  myalgias: Yes  shortness of breath: Yes  impotence: No  In general, how would you rate your overall mental or emotional health?: good  Additional concerns today:: Yes    "

## 2021-05-21 NOTE — PROGRESS NOTES
"Called lab to see if they got the order.  They are going to process the lab.     Answers for HPI/ROS submitted by the patient on 5/19/2021   Annual Exam:  In general, how would you rate your overall physical health?: fair  Frequency of exercise:: 4-5 days/week  Do you usually eat at least 4 servings of fruit and vegetables a day, include whole grains & fiber, and avoid regularly eating high fat or \"junk\" foods? : No  Taking medications regularly:: Yes  Medication side effects:: Muscle aches  Activities of Daily Living: no assistance needed  Home safety: no safety concerns identified  Hearing Impairment:: no hearing concerns  In the past 6 months, have you been bothered by leaking of urine?: No  arthralgias: Yes  myalgias: Yes  shortness of breath: Yes  impotence: No  In general, how would you rate your overall mental or emotional health?: good  Additional concerns today:: Yes    "

## 2021-05-21 NOTE — TELEPHONE ENCOUNTER
Called and spoke with Megha in the lab and she stated that they still have the blood.  See office visit notes from 5/19/21 regarding message sent to primary care provider.

## 2021-06-14 ENCOUNTER — TRANSFERRED RECORDS (OUTPATIENT)
Dept: HEALTH INFORMATION MANAGEMENT | Facility: CLINIC | Age: 86
End: 2021-06-14

## 2021-07-02 ENCOUNTER — NURSE TRIAGE (OUTPATIENT)
Dept: INTERNAL MEDICINE | Facility: CLINIC | Age: 86
End: 2021-07-02

## 2021-07-02 NOTE — TELEPHONE ENCOUNTER
Called patient to discuss symptoms.   Pain does not interfere with his activities, but he does find himself resting more than before. Pain does improve some when he elevates his legs.     He does have rheumatoid arthritis and possible side effect of his medication is leg pain, but he has been on them for about 2 years and no recent does changes. Recommended he contact his Rheumatologist as well to update them on pain.     Patient has also read the a rare side effect of the Pfizer Covid-19 vaccine is muscle pain and didn't know if this could be contributing to symptoms.     Protocol recommends appointment within 2 weeks. Patient wants to see PCP, but next available appointment with Dr. Tello is end of July. Routed to provider to review for recommendations.     Reason for Disposition    MILD pain persists > 7 days    Additional Information    Negative: Looks like a broken bone or dislocated joint (e.g., crooked or deformed)    Negative: Sounds like a life-threatening emergency to the triager    Negative: Chest pain    Negative: Difficulty breathing    Negative: Entire foot is cool or blue in comparison to other side    Negative: Unable to walk    Negative: Fever and red area (or area very tender to touch)    Negative: Fever and swollen joint    Negative: Thigh or calf pain in only one leg and present > 1 hour    Negative: Thigh, calf, or ankle swelling in only one leg    Negative: Thigh, calf, or ankle swelling in both legs, but one side is definitely more swollen    Negative: History of prior 'blood clot' in leg or lungs (i.e., deep vein thrombosis, pulmonary embolism)    Negative: History of inherited increased risk of blood clots (e.g., factor 5 Leiden, antithrombin 3, protein C or protein S deficiency, prothrombin mutation)    Negative: Major surgery in the past month    Negative: Hip or leg fracture (broken bone) in past month (or had cast on leg or ankle in past month)    Negative: Illness requiring prolonged  "bedrest in past month (e.g., immobilization, long hospital stay)    Negative: Long-distance travel in past month (e.g., car, bus, train, plane; with trip lasting 6 or more hours)    Negative: Cancer treatment in the past two months (or has cancer now)    Negative: Patient sounds very sick or weak to the triager    Negative: SEVERE pain (e.g., excruciating, unable to do any normal activities)    Negative: Cast on leg or ankle and now has increasing pain    Negative: Red area or streak and large (> 2 in. or 5 cm)    Negative: Painful rash with multiple small blisters grouped together (i.e., dermatomal distribution or 'band' or 'stripe')    Negative: Looks like a boil, infected sore, deep ulcer, or other infected rash (spreading redness, pus)    Negative: Localized rash is very painful (no fever)    Negative: Numbness in a leg or foot (i.e., loss of sensation)    Negative: Localized pain, redness or hard lump along vein    Negative: Patient wants to be seen    Answer Assessment - Initial Assessment Questions  1. ONSET: \"When did the pain start?\"       2 weeks ago  2. LOCATION: \"Where is the pain located?\"       Bilateral calves  3. PAIN: \"How bad is the pain?\"    (Scale 1-10; or mild, moderate, severe)    -  MILD (1-3): doesn't interfere with normal activities     -  MODERATE (4-7): interferes with normal activities (e.g., work or school) or awakens from sleep, limping     -  SEVERE (8-10): excruciating pain, unable to do any normal activities, unable to walk      4-5, constant  4. WORK OR EXERCISE: \"Has there been any recent work or exercise that involved this part of the body?\"       no  5. CAUSE: \"What do you think is causing the leg pain?\"      Unsure, but he is taking medications for RA that can cause muscle pain, however, he has been on them for a few years and no recent dosing changes.   6. OTHER SYMPTOMS: \"Do you have any other symptoms?\" (e.g., chest pain, back pain, breathing difficulty, swelling, rash, " "fever, numbness, weakness)      Legs feel tired  7. PREGNANCY: \"Is there any chance you are pregnant?\" \"When was your last menstrual period?\"      n/a    Protocols used: LEG PAIN-A-[place]    "

## 2021-07-02 NOTE — TELEPHONE ENCOUNTER
May schedule him to see me in any open spot (include post-hospitalization or same day) within the next 1-2 weeks.

## 2021-07-02 NOTE — TELEPHONE ENCOUNTER
Contacted patient, future appointment scheduled.     Next 5 appointments (look out 90 days)    Jul 14, 2021  2:00 PM  SHORT with Srinivasa Tello MD  [facility] ([facility] ) [address] [place] [address]  [phone]   Aug 12, 2021 11:00 AM  Return Visit with Geremias Plunkett MD  [facility] ([facility] ) [address]  [address]  [facility] [id number] [phone]

## 2021-07-02 NOTE — TELEPHONE ENCOUNTER
Patient calls to report pain in both his legs. He states they have been sore and tired for the past 2 weeks. He is wanting to see his PCP, but the first available appointment is not until the end of July, he declined to see another provider. Call patient back to advise at [phone] (home)

## 2021-07-14 ENCOUNTER — TELEPHONE (OUTPATIENT)
Dept: OTHER | Facility: CLINIC | Age: 86
End: 2021-07-14

## 2021-07-14 ENCOUNTER — OFFICE VISIT (OUTPATIENT)
Dept: INTERNAL MEDICINE | Facility: CLINIC | Age: 86
End: 2021-07-14
Payer: COMMERCIAL

## 2021-07-14 VITALS
HEIGHT: 72 IN | OXYGEN SATURATION: 96 % | HEART RATE: 86 BPM | BODY MASS INDEX: 28.04 KG/M2 | TEMPERATURE: 98.1 F | RESPIRATION RATE: 16 BRPM | DIASTOLIC BLOOD PRESSURE: 62 MMHG | SYSTOLIC BLOOD PRESSURE: 116 MMHG | WEIGHT: 207 LBS

## 2021-07-14 DIAGNOSIS — M79.661 BILATERAL CALF PAIN: Primary | ICD-10-CM

## 2021-07-14 DIAGNOSIS — M79.662 BILATERAL CALF PAIN: Primary | ICD-10-CM

## 2021-07-14 DIAGNOSIS — C61 PROSTATE CANCER (H): ICD-10-CM

## 2021-07-14 DIAGNOSIS — R09.89 OTHER SPECIFIED SYMPTOMS AND SIGNS INVOLVING THE CIRCULATORY AND RESPIRATORY SYSTEMS: ICD-10-CM

## 2021-07-14 DIAGNOSIS — I73.9 CLAUDICATION (H): Primary | ICD-10-CM

## 2021-07-14 DIAGNOSIS — R23.3 EASY BRUISING: ICD-10-CM

## 2021-07-14 DIAGNOSIS — D72.819 LEUKOPENIA, UNSPECIFIED TYPE: ICD-10-CM

## 2021-07-14 DIAGNOSIS — D72.821 MONOCYTOSIS: ICD-10-CM

## 2021-07-14 LAB
BASOPHILS # BLD AUTO: 0 10E3/UL (ref 0–0.2)
BASOPHILS NFR BLD AUTO: 0 %
EOSINOPHIL # BLD AUTO: 0.1 10E3/UL (ref 0–0.7)
EOSINOPHIL NFR BLD AUTO: 1 %
ERYTHROCYTE [DISTWIDTH] IN BLOOD BY AUTOMATED COUNT: 13.2 % (ref 10–15)
ERYTHROCYTE [SEDIMENTATION RATE] IN BLOOD BY WESTERGREN METHOD: 8 MM/HR (ref 0–20)
HCT VFR BLD AUTO: 39.7 % (ref 40–53)
HGB BLD-MCNC: 12.8 G/DL (ref 13.3–17.7)
IMM GRANULOCYTES # BLD: 0 10E3/UL
IMM GRANULOCYTES NFR BLD: 1 %
LYMPHOCYTES # BLD AUTO: 0.5 10E3/UL (ref 0.8–5.3)
LYMPHOCYTES NFR BLD AUTO: 12 %
MCH RBC QN AUTO: 34.8 PG (ref 26.5–33)
MCHC RBC AUTO-ENTMCNC: 32.2 G/DL (ref 31.5–36.5)
MCV RBC AUTO: 108 FL (ref 78–100)
MONOCYTES # BLD AUTO: 0.7 10E3/UL (ref 0–1.3)
MONOCYTES NFR BLD AUTO: 16 %
NEUTROPHILS # BLD AUTO: 3 10E3/UL (ref 1.6–8.3)
NEUTROPHILS NFR BLD AUTO: 70 %
NRBC # BLD AUTO: 0 10E3/UL
NRBC BLD AUTO-RTO: 0 /100
PLATELET # BLD AUTO: 155 10E3/UL (ref 150–450)
RBC # BLD AUTO: 3.68 10E6/UL (ref 4.4–5.9)
WBC # BLD AUTO: 4.3 10E3/UL (ref 4–11)

## 2021-07-14 PROCEDURE — 99214 OFFICE O/P EST MOD 30 MIN: CPT | Performed by: INTERNAL MEDICINE

## 2021-07-14 PROCEDURE — 80053 COMPREHEN METABOLIC PANEL: CPT | Performed by: INTERNAL MEDICINE

## 2021-07-14 PROCEDURE — 84155 ASSAY OF PROTEIN SERUM: CPT | Performed by: INTERNAL MEDICINE

## 2021-07-14 PROCEDURE — 82550 ASSAY OF CK (CPK): CPT | Performed by: INTERNAL MEDICINE

## 2021-07-14 PROCEDURE — 85652 RBC SED RATE AUTOMATED: CPT | Performed by: INTERNAL MEDICINE

## 2021-07-14 PROCEDURE — 84153 ASSAY OF PSA TOTAL: CPT | Performed by: INTERNAL MEDICINE

## 2021-07-14 PROCEDURE — 85025 COMPLETE CBC W/AUTO DIFF WBC: CPT | Performed by: INTERNAL MEDICINE

## 2021-07-14 PROCEDURE — 86140 C-REACTIVE PROTEIN: CPT | Performed by: INTERNAL MEDICINE

## 2021-07-14 PROCEDURE — 36415 COLL VENOUS BLD VENIPUNCTURE: CPT | Performed by: INTERNAL MEDICINE

## 2021-07-14 ASSESSMENT — MIFFLIN-ST. JEOR: SCORE: 1646.95

## 2021-07-14 NOTE — TELEPHONE ENCOUNTER
Pt referred to [facility] by Dr. Tello for claudication.    Per brief review of chart, pt may have been established with Dr. Valle for AAA surveillance.    AAA last imaged on 12/24/16, showing 3.2cm AAA.    Patient reports claudication symptoms, relieved with rest.    Pt needs to be scheduled for DONELL/TBI, AAA US and in person consult with Vascular Surgery (pt lives in [place]).  Orders pended.  Will route to scheduling to coordinate an appointment at next available.    Appt note: Ref by Dr. Tello for claudication; previously followed by Dr. Valle for AAA, last imaged 2016; DONELL and AAA US to be scheduled.    Alexa Londono, BSN, RN-[facility]

## 2021-07-14 NOTE — PATIENT INSTRUCTIONS
For the bilateral calf pain, Dr Tello wonders about possible circulation problem (worsens with walking), or possibly and inflammation process.     Labs ordered today to investigate possible hidden inflammatory process.   Will also update CBC with differential. If WBC count remains low, recommend touching base again with Dr Whittington.     Next step is either:  See Dr Sahu for feedback, or  See the Vascular Surgeon (eg, Dr Brown or partner).     If otherwise well, see me in May 2022 for next Annual Wellness Visit.

## 2021-07-14 NOTE — PROGRESS NOTES
"Time spent face to face with patient: 30 minutes (1440---1510), over 50 percent dedicated to health counseling and education.     Assessment & Plan   (M79.661,  M79.662) Bilateral calf pain  (primary encounter diagnosis)  Comment: Symptoms worse with walking, suspect a vascular process. Labs ordered for possible inflammatory process. Referred to vascular medicine. He will be seeing Dr Brown of Vascular Surgery soon in follow up.   Plan: ESR: Erythrocyte sedimentation rate, CRP,         inflammation, CK total, Vascular Surgery         Referral          (D72.821) Monocytosis  (D72.819) Leukopenia, unspecified type  Comment: Advised f/u with Dr Whittington of [facility] soon.   Plan: CBC with platelets and differential          (R23.8) Easy bruising  Comment: Check platelet count. F/u with Heme/Onc as they advise.     (C61) Prostate cancer (H)  Comment: Follow up with Dr Plunkett as planned.      Patient Instructions   For the bilateral calf pain, Dr Tello wonders about possible circulation problem (worsens with walking), or possibly and inflammation process.     Labs ordered today to investigate possible hidden inflammatory process.   Will also update CBC with differential. If WBC count remains low, recommend touching base again with Dr Whittington.     Next step is either:  See Dr Sahu for feedback, or  See the Vascular Surgeon (eg, Dr Brown or partner).     If otherwise well, see me in May 2022 for next Annual Wellness Visit.       No follow-ups on file.    Srinivasa Tello MD,   [facility] ELYSSA Patel is a 88 year old who presents for the following health issues : bilateral calf pain, states his \"feet feel tired\".    HPI     The patient notes very chronic fatigue with walking for several years. For about the last couple of months he has noted that his calves get sore and tired with ambulation.  He has been following with Dr Brown of Vascular Surgery for a stable small " distal infrarenal AAA for years. He reports being due to see Dr Brown in the office soon.     He has chronic abnormalities on his CBC, including leukopenia and monocytosis/lymphocytosis. He was last seen by Dr Whittington of Heme/Onc on 9/20/2020 for these concerns, with no worrisome pathology being identified.     He does note easy bruising.     He follows with Dr Sahu of [facility]th[facility] Rhe[facility]ology for seronegative rheumatoid arthritis. Dr Sahu has commented that the patient's plaquinil may be playing a role on some of his CBC findings.     Past medical, family and social histories as well as medications reviewed and updated as needed.    Review of Systems   REVIEW OF SYSTEMS: The following systems have been completely reviewed and are negative except as noted above:   Constitutional, respiratory, cardiovascular, genitourinary, musculoskeletal, dermatologic, hematologic,and neurologic systems.        Objective    /62   Pulse 86   Temp 98.1  F (36.7  C) (Oral)   Resp 16   Ht 1.829 m (6')   Wt 93.9 kg (207 lb)   SpO2 96%   BMI 28.07 kg/m    Body mass index is 28.07 kg/m .     Physical Exam   GENERAL: healthy, alert and no distress  RESP: lungs clear to auscultation - no rales, rhonchi or wheezes  CV: regular rate and rhythm, normal S1 S2, no S3 or S4, no murmur, click or rub, no peripheral edema and pedal pulses strong  ABDOMEN: soft, nontender, no hepatosplenomegaly, no masses and bowel sounds normal  MS: no gross musculoskeletal defects noted, no edema  NEURO: Normal strength and tone, mentation intact and speech normal  PSYCH: mentation appears normal, affect normal/bright          Component      Latest Ref Rng & Units 7/14/2021   WBC      4.0 - 11.0 10e3/uL 4.3   RBC Count      4.40 - 5.90 10e6/uL 3.68 (L)   Hemoglobin      13.3 - 17.7 g/dL 12.8 (L)   Hematocrit      40.0 - 53.0 % 39.7 (L)   MCV      78 - 100 fL 108 (H)   MCH      26.5 - 33.0 pg 34.8 (H)   MCHC      31.5 - 36.5 g/dL 32.2   RDW       10.0 - 15.0 % 13.2   Platelet Count      150 - 450 10e3/uL 155   % Neutrophils      % 70   % Lymphocytes      % 12   % Monocytes      % 16   % Eosinophils      % 1   % Basophils      % 0   % Immature Granulocytes      % 1   NRBCs per 100 WBC      <1 /100 0   Absolute Neutrophils      1.6 - 8.3 10e3/uL 3.0   Absolute Lymphocytes      0.8 - 5.3 10e3/uL 0.5 (L)   Absolute Monocytes      0.0 - 1.3 10e3/uL 0.7   Absolute Eosinophils      0.0 - 0.7 10e3/uL 0.1   Absolute Basophils      0.0 - 0.2 10e3/uL 0.0   Absolute Immature Granulocytes      <=0.0 10e3/uL 0.0   Absolute NRBCs      10e3/uL 0.0   Sodium      133 - 144 mmol/L 138   Potassium      3.4 - 5.3 mmol/L 4.3   Chloride      94 - 109 mmol/L 106   Carbon Dioxide      20 - 32 mmol/L 26   Anion Gap      3 - 14 mmol/L 6   Urea Nitrogen      7 - 30 mg/dL 20   Creatinine      0.66 - 1.25 mg/dL 1.10   Calcium      8.5 - 10.1 mg/dL 9.3   Glucose      70 - 99 mg/dL 105 (H)   Alkaline Phosphatase      40 - 150 U/L 53   AST      0 - 45 U/L 18   ALT      0 - 70 U/L 26   Protein Total      6.8 - 8.8 g/dL 7.2   Albumin      3.4 - 5.0 g/dL 4.0   Bilirubin Total      0.2 - 1.3 mg/dL 0.4   GFR Estimate      >60 mL/min/1.73m2 60 (L)   Albumin Fraction      3.7 - 5.1 g/dL 4.3   Alpha 1 Fraction      0.2 - 0.4 g/dL 0.3   Alpha 2 Fraction      0.5 - 0.9 g/dL 0.6   Beta Fraction      0.6 - 1.0 g/dL 0.7   Gamma Fraction      0.7 - 1.6 g/dL 1.1   Monoclonal Peak      <=0.0 g/dL 0.0   ELP Interpretation:       Essentially normal electrophoretic pattern. No obvious monoclonal proteins seen. Pathologic significance requires clinical correlation. DARLEEN Cummings M.D., Ph.D., Pathologist.   Sed Rate      0 - 20 mm/hr 8   CRP Inflammation      0.0 - 8.0 mg/L <2.9   CK Total      U/L 86   PSA      0.00 - 4.00 ug/L 0.02   Total Protein Serum      6.8 - 8.8 g/dL 6.8

## 2021-07-15 LAB
ALBUMIN SERPL-MCNC: 4 G/DL (ref 3.4–5)
ALP SERPL-CCNC: 53 U/L (ref 40–150)
ALT SERPL W P-5'-P-CCNC: 26 U/L (ref 0–70)
ANION GAP SERPL CALCULATED.3IONS-SCNC: 6 MMOL/L (ref 3–14)
AST SERPL W P-5'-P-CCNC: 18 U/L (ref 0–45)
BILIRUB SERPL-MCNC: 0.4 MG/DL (ref 0.2–1.3)
BUN SERPL-MCNC: 20 MG/DL (ref 7–30)
CALCIUM SERPL-MCNC: 9.3 MG/DL (ref 8.5–10.1)
CHLORIDE BLD-SCNC: 106 MMOL/L (ref 94–109)
CK SERPL-CCNC: 86 U/L
CO2 SERPL-SCNC: 26 MMOL/L (ref 20–32)
CREAT SERPL-MCNC: 1.1 MG/DL (ref 0.66–1.25)
CRP SERPL-MCNC: <2.9 MG/L (ref 0–8)
GFR SERPL CREATININE-BSD FRML MDRD: 60 ML/MIN/1.73M2
GLUCOSE BLD-MCNC: 105 MG/DL (ref 70–99)
POTASSIUM BLD-SCNC: 4.3 MMOL/L (ref 3.4–5.3)
PROT SERPL-MCNC: 7.2 G/DL (ref 6.8–8.8)
SODIUM SERPL-SCNC: 138 MMOL/L (ref 133–144)

## 2021-07-15 ASSESSMENT — ASTHMA QUESTIONNAIRES: ACT_TOTALSCORE: 17

## 2021-07-15 NOTE — TELEPHONE ENCOUNTER
7/15/2021 LM with wife, patient to call back and schedule     Pt needs to be scheduled for DONELL/TBI, AAA US and in person consult with Vascular Surgery (pt lives in [place]).  Orders pended.  Will route to scheduling to coordinate an appointment at next available.     Appt note: Ref by Dr. Tello for claudication; previously followed by Dr. Valle for AAA, last imaged 2016; DONELL and AAA US to be scheduled.      Candy CEVALLOS

## 2021-07-16 LAB
ALBUMIN SERPL ELPH-MCNC: 4.3 G/DL (ref 3.7–5.1)
ALPHA1 GLOB SERPL ELPH-MCNC: 0.3 G/DL (ref 0.2–0.4)
ALPHA2 GLOB SERPL ELPH-MCNC: 0.6 G/DL (ref 0.5–0.9)
B-GLOBULIN SERPL ELPH-MCNC: 0.7 G/DL (ref 0.6–1)
GAMMA GLOB SERPL ELPH-MCNC: 1.1 G/DL (ref 0.7–1.6)
M PROTEIN SERPL ELPH-MCNC: 0 G/DL
PROT PATTERN SERPL ELPH-IMP: NORMAL
PSA SERPL-MCNC: 0.02 UG/L (ref 0–4)
TOTAL PROTEIN SERUM FOR ELP: 6.8 G/DL (ref 6.8–8.8)

## 2021-07-16 PROCEDURE — 84165 PROTEIN E-PHORESIS SERUM: CPT | Performed by: PATHOLOGY

## 2021-07-21 ENCOUNTER — OFFICE VISIT (OUTPATIENT)
Dept: SURGERY | Facility: CLINIC | Age: 86
End: 2021-07-21
Attending: SURGERY
Payer: COMMERCIAL

## 2021-07-21 ENCOUNTER — HOSPITAL ENCOUNTER (OUTPATIENT)
Dept: ULTRASOUND IMAGING | Facility: CLINIC | Age: 86
End: 2021-07-21
Attending: SURGERY
Payer: COMMERCIAL

## 2021-07-21 VITALS
HEIGHT: 72 IN | WEIGHT: 207 LBS | SYSTOLIC BLOOD PRESSURE: 136 MMHG | HEART RATE: 98 BPM | DIASTOLIC BLOOD PRESSURE: 64 MMHG | OXYGEN SATURATION: 95 % | RESPIRATION RATE: 16 BRPM | BODY MASS INDEX: 28.04 KG/M2

## 2021-07-21 DIAGNOSIS — R09.89 PROMINENT POPLITEAL PULSE: ICD-10-CM

## 2021-07-21 DIAGNOSIS — M79.662 BILATERAL CALF PAIN: ICD-10-CM

## 2021-07-21 DIAGNOSIS — I73.9 CLAUDICATION (H): ICD-10-CM

## 2021-07-21 DIAGNOSIS — R09.89 OTHER SPECIFIED SYMPTOMS AND SIGNS INVOLVING THE CIRCULATORY AND RESPIRATORY SYSTEMS: ICD-10-CM

## 2021-07-21 DIAGNOSIS — M79.661 BILATERAL CALF PAIN: ICD-10-CM

## 2021-07-21 DIAGNOSIS — I71.40 ABDOMINAL AORTIC ANEURYSM (AAA) 30 TO 34 MM IN DIAMETER (H): Primary | ICD-10-CM

## 2021-07-21 PROCEDURE — 93924 LWR XTR VASC STDY BILAT: CPT

## 2021-07-21 PROCEDURE — 93978 VASCULAR STUDY: CPT

## 2021-07-21 PROCEDURE — 99203 OFFICE O/P NEW LOW 30 MIN: CPT | Performed by: SURGERY

## 2021-07-21 ASSESSMENT — ENCOUNTER SYMPTOMS
PSYCHIATRIC NEGATIVE: 1
DYSPNEA ON EXERTION: 1
BRUISES/BLEEDS EASILY: 1
MUSCLE CRAMPS: 1
SLEEP DISTURBANCES DUE TO BREATHING: 1
ENDOCRINE NEGATIVE: 1
EYES NEGATIVE: 1
NEUROLOGICAL NEGATIVE: 1
GASTROINTESTINAL NEGATIVE: 1
ALLERGIC/IMMUNOLOGIC NEGATIVE: 1
SHORTNESS OF BREATH: 1

## 2021-07-21 ASSESSMENT — MIFFLIN-ST. JEOR: SCORE: 1646.95

## 2021-07-21 NOTE — PROGRESS NOTES
"[facility] INITIAL VASCULAR SURGERY CONSULT    Impression:   1.  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  Not yet in need of repair.    2.  Normal lower extremity arterial exam with palpable pedal pulses and normal ABIs.  His complaints of bilateral calf fatigue are not in any way related to peripheral arterial disease.    3.  Prominent bilateral popliteal artery pulsations.    Plan:   I had a nice discussion with Emerson and his wife reviewing all the above.  They understand that we would only consider AAA repair for an aneurysm approaching 5.5 cm.  His aneurysm has remained  3.2 cm in diameter for at least the past 5 years.  I plan to reimage it again in 3 years.    Relative to his complaints of bilateral calf fatigue they understand that he has palpable pedal pulses with normal ABIs.  There is no arterial etiology to explain his calf symptoms.  No further arterial work-up is required.    Lastly he does have prominent bilateral popliteal artery pulsations.  For completeness sake I will obtain bilateral popliteal artery ultrasounds to rule out aneurysmal involvement of those structures.  I will meet with him for a quick clinic visit to discuss those results in the near future.        HPI:   Jorge Salomon is an 88-year-old old retired  with steroid-dependent rheumatoid arthritis and chronic complaints of dyspnea on exertion.  He has never smoked.  He recently complained of bilateral calf \"fatigue\" to his primary care physician, Dr. Tello.  He is subsequently referred for vascular surgical consultation to rule out significant peripheral arterial disease as well as follow-up of a previously identified small AAA.    Emerson reports significant shortness of breath and/or dyspnea on exertion for the past 15 years.  The etiology is unclear.  His breathing difficulties limit his ambulation.  He feels he could " ambulate 1/4 to 1/2 mile without having to stop secondary to shortness of breath.  He has complained of some bilateral calf fatigue.  He does have history of hyperlipidemia but is not diabetic.      CURRENT MEDICATIONS  acetaminophen (TYLENOL) 500 MG tablet, Take 1-2 tablets (500-1,000 mg) by mouth every 6 hours as needed for mild pain  betamethasone dipropionate (DIPROSONE) 0.05 % external cream, Apply topically 2 times daily  Cholecalciferol (VITAMIN D PO), Take  by mouth.  dutasteride (AVODART) 0.5 MG capsule, TAKE 1 CAPSULE DAILY  fish oil-omega-3 fatty acids (FISH OIL) 1000 MG capsule, Take 2 g by mouth daily.  FLAXSEED, LINSEED, PO,   furosemide (LASIX) 20 MG tablet, Take 0.5 tablets (10 mg) by mouth daily  GLUCOSAMINE PO, Take by mouth 2 times daily   hydrocortisone (CORTAID) 1 % cream, Apply sparingly to affected area twice daily as needed.  hydroxychloroquine (PLAQUENIL) 200 MG tablet, Take 2 tablets (400 mg) by mouth daily  predniSONE (DELTASONE) 2.5 MG tablet, Take 2.5 mg by mouth daily  tamsulosin (FLOMAX) 0.4 MG capsule, TAKE 1 CAPSULE DAILY. TAKE 30 MINUTES AFTER A MEAL    No current facility-administered medications on file prior to visit.        PAST MEDICAL HISTORY  Past Medical History:   Diagnosis Date     AAA (abdominal aortic aneurysm) (H) 05/11/2020    7/10/12 Focal ectasia of the distal abdominal aorta at the bifurcation is slightly larger measuring 2.7 x 2.4 cm, previously 2.2 x 2.1 cm when viewing prior exam. Focal outpouching posteriorly at this location previously (7/19/10).      Arthritis      Asthma 05/11/2020     Benign neoplasm of colon 6/98, 3/05    Hyperplastic polyp on both procedures.     Benign prostatic hyperplasia 01/16/2003     Problem list name updated by automated process. Provider to review and confirm     CHISHOLM (dyspnea on exertion) 05/11/2020     Dry eyes      Hernia, abdominal      Hyperlipidemia LDL goal <100 10/31/2010     Hypertrophy (benign) of prostate     hx of  "episodic prostatits     Internal hemorrhoids      Irritable bowel syndrome     IBS     Macular puckering of retina      Mitral valve disorder 2020     Mitral valve prolapse      Mumps      Orthopnea 2020     SHREYAS with use of CPAP 2020     Other disorders of vitreous     Vitreo-macular traction syndrome, left eye     Prostate cancer (H) 2009     Pure hypercholesterolemia      RBBB 2020     Seronegative rheumatoid arthritis (H) 2018     Sjogren's syndrome (H) 2018    Patient reports his ophthalmologist diagnosed him with this 30-40 years ago      Sleep apnea          PAST SURGICAL HISTORY:  Past Surgical History:   Procedure Laterality Date     COLONOSCOPY      one \"1/4 inch hyperplastic\" polyp.     COLONOSCOPY  3/2005    One hyperplastic polyp     CYSTOSCOPY       Full thickness macular hole, left eye       HERNIORRHAPHY INGUINAL  2014    Procedure: HERNIORRHAPHY INGUINAL;  Surgeon: Reji Engle MD;  Location: [facility] OR     Left eye cataract  2000     Left vitreoretinal surgery  08     REMOVAL OF SPERM DUCT(S)  1970     Right eye Cataract  2012     VASECTOMY         ALLERGIES     Allergies   Allergen Reactions     No Known Allergies        FAMILY HISTORY  Family History   Problem Relation Age of Onset     Heart Disease Mother          age 70. Had Heart Failure / inactive thyroid treatment cause cardiac failure     Thyroid Disease Mother         inactive thyroid, also \"2-3\" siblings     Prostate Cancer Father         Fatal Prostate CA,  age 80     Heart Disease Brother         Fatal MI, had diabetes. (Giovanni)  age 82,      Heart Disease Brother          in his 90's after a fall (Jarrod). Has had pacemaker.     Heart Disease Sister         Born 1924 (Kim), unspecified \"heart problems\"     Family History Negative Sister          in her 90's (Ayde).      Respiratory Brother          age 75 (Surya) COPD/hip " fracture/pneumonia     Heart Disease Brother          age 82 of heart attack (Mukund)       SOCIAL HISTORY  Social History     Tobacco Use     Smoking status: Never Smoker     Smokeless tobacco: Never Used   Vaping Use     Vaping Use: Never used   Substance Use Topics     Alcohol use: No     Drug use: No       ROS:   Review of Systems   Constitutional: Positive for malaise/fatigue.   HENT: Negative.    Eyes: Negative.    Cardiovascular: Positive for dyspnea on exertion and leg swelling.   Respiratory: Positive for shortness of breath and sleep disturbances due to breathing.    Endocrine: Negative.    Hematologic/Lymphatic: Bruises/bleeds easily.   Skin: Negative.    Musculoskeletal: Positive for muscle cramps.   Gastrointestinal: Negative.    Genitourinary: Negative.    Neurological: Negative.    Psychiatric/Behavioral: Negative.    Allergic/Immunologic: Negative.          EXAM:  There were no vitals taken for this visit.  Physical Exam  Vitals reviewed.   Constitutional:       Appearance: Normal appearance.   HENT:      Head: Normocephalic and atraumatic.   Eyes:      General: No scleral icterus.     Extraocular Movements: Extraocular movements intact.      Pupils: Pupils are equal, round, and reactive to light.   Cardiovascular:      Pulses:           Radial pulses are 2+ on the right side and 2+ on the left side.        Femoral pulses are 2+ on the right side and 2+ on the left side.       Popliteal pulses are 3+ on the right side and 3+ on the left side.        Dorsalis pedis pulses are 1+ on the right side and 2+ on the left side.        Posterior tibial pulses are 2+ on the right side and 2+ on the left side.   Abdominal:      Tenderness: There is no abdominal tenderness.   Musculoskeletal:         General: Normal range of motion.      Cervical back: Normal range of motion.      Right lower le+ Edema present.      Left lower le+ Edema present.   Skin:     General: Skin is warm and dry.    Neurological:      Mental Status: He is alert and oriented to person, place, and time. Mental status is at baseline.   Psychiatric:         Mood and Affect: Mood normal.         Behavior: Behavior normal.         Thought Content: Thought content normal.         Judgment: Judgment normal.       Labs:  LIPID RESULTS:  Lab Results   Component Value Date    CHOL 141 05/19/2021    HDL 51 05/19/2021    LDL 79 05/19/2021    TRIG 56 05/19/2021    CHOLHDLRATIO 3.0 05/30/2013       CBC RESULTS:  Lab Results   Component Value Date    WBC 4.3 07/14/2021    WBC Canceled, Test credited 05/19/2021    WBC 3.1 (L) 05/19/2021    RBC 3.68 (L) 07/14/2021    RBC Canceled, Test credited 05/19/2021    RBC 3.76 (L) 05/19/2021    HGB 12.8 (L) 07/14/2021    HGB Canceled, Test credited 05/19/2021    HGB 13.5 05/19/2021    HCT 39.7 (L) 07/14/2021    HCT Canceled, Test credited 05/19/2021    HCT 39.8 (L) 05/19/2021     (H) 07/14/2021    MCV Canceled, Test credited 05/19/2021     (H) 05/19/2021    MCH 34.8 (H) 07/14/2021    MCH Canceled, Test credited 05/19/2021    MCH 35.9 (H) 05/19/2021    MCHC 32.2 07/14/2021    MCHC Canceled, Test credited 05/19/2021    MCHC 33.9 05/19/2021    RDW 13.2 07/14/2021    RDW Canceled, Test credited 05/19/2021    RDW 12.8 05/19/2021     07/14/2021    PLT Canceled, Test credited 05/19/2021     (L) 05/19/2021       BMP RESULTS:  Lab Results   Component Value Date     07/14/2021     05/19/2021    POTASSIUM 4.3 07/14/2021    POTASSIUM 4.0 05/19/2021    CHLORIDE 106 07/14/2021    CHLORIDE 106 05/19/2021    CO2 26 07/14/2021    CO2 33 (H) 05/19/2021    ANIONGAP 6 07/14/2021    ANIONGAP 1 (L) 05/19/2021     (H) 07/14/2021    GLC 88 05/19/2021    BUN 20 07/14/2021    BUN 16 05/19/2021    CR 1.10 07/14/2021    CR 1.03 05/19/2021    GFRESTIMATED 60 (L) 07/14/2021    GFRESTIMATED 64 05/19/2021    GFRESTBLACK 75 05/19/2021    MARILYN 9.3 07/14/2021    MARILYN 8.8 05/19/2021        A1C  RESULTS:  No results found for: A1C      Imaging:      US AORTA/IVC/ILIAC DUPLEX COMPLETE 7/21/2021 1:56 PM     HISTORY: 88-year-old patient with history of abdominal aortic  aneurysm.     COMPARISON: CT examination performed 12/24/2016.     FINDINGS: The proximal abdominal aorta is 2.1 cm AP x 2.67 cm  transverse. The mid abdominal aorta is 2.37 cm AP x 2 cm transverse.  The distal abdominal aorta measures up to 3.2 cm AP x 2.2 cm  transverse, unchanged from previous CT exam measurements with overall  length of 4.5 cm. The right common iliac artery is 1.1 x 1.2 cm and  the left common iliac artery is 1.2 x 1.1 cm. The aneurysm is somewhat  saccular with aneurysmal segment extending posteriorly immediately  proximal to the bifurcation based on previous CT exam.                                                                      IMPRESSION: Distal abdominal aorta is 3.2 cm AP x 2.27 cm transverse x  4.5 cm in length. These measurements are relatively unchanged compared  to previous CT exam in 2016. At that time, the aneurysm was somewhat  saccular, though overall shape of the aneurysm more difficult to  distinguish with ultrasound exam.       Resting and post exercise ABIs today were normal.    Total length of this encounter was 30 minutes.    Fahad Brown MD

## 2021-07-22 ENCOUNTER — TELEPHONE (OUTPATIENT)
Dept: OTHER | Facility: CLINIC | Age: 86
End: 2021-07-22

## 2021-07-22 DIAGNOSIS — R09.89 PROMINENT POPLITEAL PULSE: Primary | ICD-10-CM

## 2021-07-22 NOTE — TELEPHONE ENCOUNTER
7/22/2021 LMTCB and schedule    Per 7/21/21 visit with Dr. Brown, pt needs BLE arterial US and in clinic visit to discuss results at next available.     Appt note:  Follow up to 7/21/21 appt; prominent popliteal pulses; US to be scheduled.      Candy CEVALLOS

## 2021-07-22 NOTE — TELEPHONE ENCOUNTER
Per 7/21/21 visit with Dr. Brown, pt needs BLE arterial US and in clinic visit to discuss results at next available.    Appt note:  Follow up to 7/21/21 appt; prominent popliteal pulses; US to be scheduled.    Alexa Londono, RACHNAN, RN-[facility] [place]

## 2021-07-26 NOTE — TELEPHONE ENCOUNTER
Emerson is going to call insurance to verify this imaging is covered. WCB when ready to schedule.    Arleth HENDERSON

## 2021-08-03 ENCOUNTER — HOSPITAL ENCOUNTER (OUTPATIENT)
Dept: ULTRASOUND IMAGING | Facility: CLINIC | Age: 86
Discharge: HOME | End: 2021-08-03
Attending: SURGERY | Admitting: SURGERY
Payer: COMMERCIAL

## 2021-08-03 DIAGNOSIS — R09.89 PROMINENT POPLITEAL PULSE: ICD-10-CM

## 2021-08-03 PROCEDURE — 93925 LOWER EXTREMITY STUDY: CPT

## 2021-08-11 ENCOUNTER — OFFICE VISIT (OUTPATIENT)
Dept: SURGERY | Facility: CLINIC | Age: 86
End: 2021-08-11
Payer: COMMERCIAL

## 2021-08-11 VITALS
OXYGEN SATURATION: 96 % | HEART RATE: 84 BPM | BODY MASS INDEX: 24.44 KG/M2 | RESPIRATION RATE: 16 BRPM | SYSTOLIC BLOOD PRESSURE: 122 MMHG | HEIGHT: 77 IN | DIASTOLIC BLOOD PRESSURE: 64 MMHG | WEIGHT: 207 LBS

## 2021-08-11 DIAGNOSIS — I72.4 ANEURYSM OF LEFT POPLITEAL ARTERY (H): Primary | ICD-10-CM

## 2021-08-11 DIAGNOSIS — I71.40 ABDOMINAL AORTIC ANEURYSM (AAA) 30 TO 34 MM IN DIAMETER (H): ICD-10-CM

## 2021-08-11 PROCEDURE — 99213 OFFICE O/P EST LOW 20 MIN: CPT | Performed by: SURGERY

## 2021-08-11 ASSESSMENT — MIFFLIN-ST. JEOR: SCORE: 1726.33

## 2021-08-12 NOTE — PROGRESS NOTES
Jorge Salomon is an 88-year-old gentleman who I saw in consultation on 7/21/2021 for 3.2 cm AAA unchanged over the past 5 years and complaints of lower extremity fatigue rule out peripheral arterial disease.  He had easily palpable pedal pulses with normal ABIs.  I reassured him that there is no arterial etiology for his lower extremity complaints.  I did note prominent bilateral popliteal artery pulsations.  For completeness sake I arrange for bilateral popliteal artery ultrasounds.  Emerson returns today to discuss those results.    There has been no other change in his health history or his physical exam.  He had easily palpable pedal pulses bilaterally.    Imaging:  US LOWER EXTREMITY ARTERIAL DUPLEX BILATERAL  8/3/2021 1:58 PM      HISTORY:  Prominent popliteal artery pulses. History of abdominal  aortic aneurysm. Evaluate for possible popliteal artery aneurysms.     COMPARISON: None.     FINDINGS: Color Doppler and spectral waveform analysis performed.     The right popliteal artery is ectatic and has a maximum diameter of  0.9 x 1.0 cm.     The left above-knee popliteal artery is mildly aneurysmal and has a  maximum diameter of 0.8 x 1.3 cm.     IZABELLA FRENCH MD      IMPRESSION:  1.  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    2.  Ectatic 10 mm right popliteal artery.    3.  Stable 3.2 cm infrarenal AAA unchanged over the past 5 years.    4.  Complaints of lower extremity fatigue with easily palpable pulses and normal ABIs.  There is no arterial etiology for his SYMPTOMS.    RECOMMENDATION:  I reviewed all the above with Emerson.  He understands that we would consider repair of a popliteal artery aneurysm if diameter exceeded 2 cm or if there were symptoms.  He understands that I do not believe his complaints of leg fatigue are in any way related to his circulation.  He will continue his medical regimen.  Vascular surgical follow-up will be with me in 3 years for a AAA  ultrasound and bilateral popliteal artery ultrasound.    Total length of this encounter was 20 minutes.    Cory Brown MD

## 2021-08-17 ENCOUNTER — LAB (OUTPATIENT)
Dept: LAB | Facility: CLINIC | Age: 86
End: 2021-08-17
Payer: COMMERCIAL

## 2021-08-17 ENCOUNTER — TELEPHONE (OUTPATIENT)
Dept: UROLOGY | Facility: CLINIC | Age: 86
End: 2021-08-17

## 2021-08-17 ENCOUNTER — TELEPHONE (OUTPATIENT)
Facility: CLINIC | Age: 86
End: 2021-08-17

## 2021-08-17 DIAGNOSIS — R35.0 URINARY FREQUENCY: ICD-10-CM

## 2021-08-17 DIAGNOSIS — R82.90 CLOUDY URINE: Primary | ICD-10-CM

## 2021-08-17 DIAGNOSIS — N39.0 URINARY TRACT INFECTION: ICD-10-CM

## 2021-08-17 DIAGNOSIS — R82.90 CLOUDY URINE: ICD-10-CM

## 2021-08-17 LAB
ALBUMIN UR-MCNC: NEGATIVE MG/DL
APPEARANCE UR: CLEAR
BILIRUB UR QL STRIP: NEGATIVE
COLOR UR AUTO: YELLOW
GLUCOSE UR STRIP-MCNC: NEGATIVE MG/DL
HGB UR QL STRIP: NEGATIVE
KETONES UR STRIP-MCNC: NEGATIVE MG/DL
LEUKOCYTE ESTERASE UR QL STRIP: NEGATIVE
NITRATE UR QL: NEGATIVE
PH UR STRIP: 6 [PH] (ref 5–7)
SP GR UR STRIP: 1.02 (ref 1–1.03)
UROBILINOGEN UR STRIP-ACNC: 0.2 E.U./DL

## 2021-08-17 PROCEDURE — 81003 URINALYSIS AUTO W/O SCOPE: CPT | Mod: QW

## 2021-08-17 PROCEDURE — 87086 URINE CULTURE/COLONY COUNT: CPT

## 2021-08-17 NOTE — TELEPHONE ENCOUNTER
Urology pt formerly seen by Dr. Miles and scheduled to see Dr. Plunkett 10/18/21.   Returned call to [place]. He reports symptoms of cloudy urine and frequency. Temp and burning with urination denied. Orders placed for UA/UC. Pt will have this done today at a [facility]. He requests to get Rx for abx today. Will forward this request but did explain that provider may want to wait until UC results in. In that case we can send Rx to the pharmacy where he will be traveling. He requests to use his cell: [phone].  ALEIDA Harman RN      [facility]    Phone Message    May a detailed message be left on voicemail: no     Reason for Call: Symptoms or Concerns     If patient has red-flag symptoms, warm transfer to triage line    Current symptom or concern: UTI    Symptoms have been present for:  1 day(s)    Has patient previously been seen for this? No    By    Date:    Are there any new or worsening symptoms? NO, Pt. Is leaving town tomorrow for 5 days, and would appreciate a call back today.      Action Taken: Other: Urology    Travel Screening: Not Applicable

## 2021-08-18 DIAGNOSIS — R09.89 PROMINENT POPLITEAL PULSE: ICD-10-CM

## 2021-08-18 DIAGNOSIS — I71.40 ABDOMINAL AORTIC ANEURYSM (AAA) WITHOUT RUPTURE (H): Primary | ICD-10-CM

## 2021-08-18 LAB — BACTERIA UR CULT: NO GROWTH

## 2021-09-11 ENCOUNTER — HEALTH MAINTENANCE LETTER (OUTPATIENT)
Age: 86
End: 2021-09-11

## 2021-09-13 ENCOUNTER — TRANSFERRED RECORDS (OUTPATIENT)
Dept: HEALTH INFORMATION MANAGEMENT | Facility: CLINIC | Age: 86
End: 2021-09-13

## 2021-09-13 LAB
ALT SERPL-CCNC: 21 IU/L (ref 5–40)
CREATININE (EXTERNAL): 0.83 MG/DL (ref 0.5–1.3)

## 2021-10-18 ENCOUNTER — TRANSFERRED RECORDS (OUTPATIENT)
Dept: HEALTH INFORMATION MANAGEMENT | Facility: CLINIC | Age: 86
End: 2021-10-18

## 2021-10-18 ENCOUNTER — OFFICE VISIT (OUTPATIENT)
Dept: UROLOGY | Facility: CLINIC | Age: 86
End: 2021-10-18
Payer: COMMERCIAL

## 2021-10-18 VITALS
HEIGHT: 73 IN | SYSTOLIC BLOOD PRESSURE: 118 MMHG | DIASTOLIC BLOOD PRESSURE: 66 MMHG | WEIGHT: 202 LBS | BODY MASS INDEX: 26.77 KG/M2

## 2021-10-18 DIAGNOSIS — C61 PROSTATE CANCER (H): ICD-10-CM

## 2021-10-18 DIAGNOSIS — R39.12 BENIGN PROSTATIC HYPERPLASIA WITH WEAK URINARY STREAM: ICD-10-CM

## 2021-10-18 DIAGNOSIS — N40.1 BENIGN PROSTATIC HYPERPLASIA WITH WEAK URINARY STREAM: ICD-10-CM

## 2021-10-18 PROCEDURE — 99213 OFFICE O/P EST LOW 20 MIN: CPT | Performed by: STUDENT IN AN ORGANIZED HEALTH CARE EDUCATION/TRAINING PROGRAM

## 2021-10-18 RX ORDER — DUTASTERIDE 0.5 MG/1
1 CAPSULE, LIQUID FILLED ORAL DAILY
Qty: 90 CAPSULE | Refills: 3 | Status: SHIPPED | OUTPATIENT
Start: 2021-10-18 | End: 2021-10-18

## 2021-10-18 RX ORDER — DUTASTERIDE 0.5 MG/1
1 CAPSULE, LIQUID FILLED ORAL DAILY
Qty: 90 CAPSULE | Refills: 3 | Status: SHIPPED | OUTPATIENT
Start: 2021-10-18 | End: 2021-10-19

## 2021-10-18 RX ORDER — TAMSULOSIN HYDROCHLORIDE 0.4 MG/1
CAPSULE ORAL
Qty: 90 CAPSULE | Refills: 3 | Status: SHIPPED | OUTPATIENT
Start: 2021-10-18 | End: 2021-10-18

## 2021-10-18 RX ORDER — TAMSULOSIN HYDROCHLORIDE 0.4 MG/1
CAPSULE ORAL
Qty: 90 CAPSULE | Refills: 3 | Status: SHIPPED | OUTPATIENT
Start: 2021-10-18 | End: 2021-10-19

## 2021-10-18 ASSESSMENT — PAIN SCALES - GENERAL: PAINLEVEL: NO PAIN (0)

## 2021-10-18 ASSESSMENT — MIFFLIN-ST. JEOR: SCORE: 1632.21

## 2021-10-18 NOTE — NURSING NOTE
Chief Complaint   Patient presents with     Prostate Cancer     Pt. not experiencing any urinary symptoms.      Melani Noguera, EMT

## 2021-10-18 NOTE — PATIENT INSTRUCTIONS
Continue avodart and flomax  Follow up in 6 months with repeat PSA prior, for PVR, AUA symptom score

## 2021-10-18 NOTE — LETTER
"10/18/2021       RE: Jorge Salomon  [address] [place] [facility] [address]     Dear Colleague,    Thank you for referring your patient, Jorge Salomon, to the [facility] [place] [facility]. Please see a copy of my visit note below.    CHIEF COMPLAINT   Jorge Salomon who is a 88 year old male returns today for follow-up of [place] 3+6 prostate cancer s/p radiation 2017    HPI   Jorge Salomon is a 88 year old male who presents with a history of prostate cancer s/p radiation.  Former [facility] patient.    6/20/2008 [place] 3+3=6 in left apex  4/22/2009 [place] 3+3=6 in right apex  4/6/2010 benign    PIRADS 4 MRI 2017 with increase in size of lesion since 2013, with suspicion EPE    Radiation in 2017 w/o concurrent ADT    Most recent PSA 7/24/2021 was low at 0.02.    Continues on avodart and flomax, denies significant urinary symptoms.    In 8/2021 had symptoms including cloudy urine and frequency. Urinalysis was unremarkable and urine culture negative. Has not had issues since then. Denies gross hematuria    PHYSICAL EXAM  Patient is a 88 year old  male   Vitals: Blood pressure 118/66, height 1.842 m (6' 0.5\"), weight 91.6 kg (202 lb).  Body mass index is 27.02 kg/m .  General Appearance Adult:   Alert, no acute distress, oriented  HENT: throat/mouth:normal, good dentition  Lungs: no respiratory distress, or pursed lip breathing  Heart: No obvious jugular venous distension present  Abdomen: nondistended  Musculoskeltal: extremities normal, no peripheral edema  Skin: no suspicious lesions or rashes  Neuro: Alert, oriented, speech and mentation normal  Psych: affect and mood normal  Gait: Normal    Component      Latest Ref Rng & Units 8/17/2021   Color Urine      Colorless, Straw, Light Yellow, Yellow Yellow   Appearance Urine      Clear Clear   Glucose Urine      Negative mg/dL Negative   Bilirubin Urine      Negative Negative "   Ketones Urine      Negative mg/dL Negative   Specific Gravity Urine      1.003 - 1.035 1.020   Blood Urine      Negative Negative   pH Urine      5.0 - 7.0 6.0   Protein Albumin Urine      Negative mg/dL Negative   Urobilinogen Urine      0.2, 1.0 E.U./dL 0.2   Nitrite Urine      Negative Negative   Leukocyte Esterase Urine      Negative Negative     8/17/2021 urine culture aerobic bacterial  Culture No Growth             ASSESSMENT and PLAN  89 yo M with [place] 3+6 prostate cancer s/p radiation 2017. PSA remains low. Has BPH with weak stream well controlled on alpha blocker and 5ARI    Continue tamsulosin and dutasteride, refilled    Follow up in 6 months with repeat PSA prior, for PVR, AUA symptom score    Geremias Plunkett MD   [facility]  [facility] Phone: [phone]

## 2021-10-18 NOTE — PROGRESS NOTES
"CHIEF COMPLAINT   Jorge Salomon who is a 88 year old male returns today for follow-up of [place] 3+6 prostate cancer s/p radiation 2017    HPI   Jorge Salomon is a 88 year old male who presents with a history of prostate cancer s/p radiation.  Former [facility] patient.    6/20/2008 [place] 3+3=6 in left apex  4/22/2009 [place] 3+3=6 in right apex  4/6/2010 benign    PIRADS 4 MRI 2017 with increase in size of lesion since 2013, with suspicion EPE    Radiation in 2017 w/o concurrent ADT    Most recent PSA 7/24/2021 was low at 0.02.    Continues on avodart and flomax, denies significant urinary symptoms.    In 8/2021 had symptoms including cloudy urine and frequency. Urinalysis was unremarkable and urine culture negative. Has not had issues since then. Denies gross hematuria    PHYSICAL EXAM  Patient is a 88 year old  male   Vitals: Blood pressure 118/66, height 1.842 m (6' 0.5\"), weight 91.6 kg (202 lb).  Body mass index is 27.02 kg/m .  General Appearance Adult:   Alert, no acute distress, oriented  HENT: throat/mouth:normal, good dentition  Lungs: no respiratory distress, or pursed lip breathing  Heart: No obvious jugular venous distension present  Abdomen: nondistended  Musculoskeltal: extremities normal, no peripheral edema  Skin: no suspicious lesions or rashes  Neuro: Alert, oriented, speech and mentation normal  Psych: affect and mood normal  Gait: Normal    Component      Latest Ref Rng & Units 8/17/2021   Color Urine      Colorless, Straw, Light Yellow, Yellow Yellow   Appearance Urine      Clear Clear   Glucose Urine      Negative mg/dL Negative   Bilirubin Urine      Negative Negative   Ketones Urine      Negative mg/dL Negative   Specific Gravity Urine      1.003 - 1.035 1.020   Blood Urine      Negative Negative   pH Urine      5.0 - 7.0 6.0   Protein Albumin Urine      Negative mg/dL Negative   Urobilinogen Urine      0.2, 1.0 E.U./dL 0.2   Nitrite Urine      Negative Negative   Leukocyte Esterase " Urine      Negative Negative     8/17/2021 urine culture aerobic bacterial  Culture No Growth             ASSESSMENT and PLAN  89 yo M with [place] 3+6 prostate cancer s/p radiation 2017. PSA remains low. Has BPH with weak stream well controlled on alpha blocker and 5ARI    Continue tamsulosin and dutasteride, refilled    Follow up in 6 months with repeat PSA prior, for PVR, AUA symptom score    Geremias Plunkett MD   [facility]  [facility] Phone: [phone]

## 2021-10-19 RX ORDER — TAMSULOSIN HYDROCHLORIDE 0.4 MG/1
CAPSULE ORAL
Qty: 90 CAPSULE | Refills: 3 | Status: SHIPPED | OUTPATIENT
Start: 2021-10-19 | End: 2022-04-14

## 2021-10-19 RX ORDER — DUTASTERIDE 0.5 MG/1
CAPSULE, LIQUID FILLED ORAL
Qty: 90 CAPSULE | Refills: 3 | Status: SHIPPED | OUTPATIENT
Start: 2021-10-19 | End: 2022-04-14

## 2021-11-30 ENCOUNTER — ALLIED HEALTH/NURSE VISIT (OUTPATIENT)
Dept: INTERNAL MEDICINE | Facility: CLINIC | Age: 86
End: 2021-11-30
Payer: COMMERCIAL

## 2021-11-30 DIAGNOSIS — Z23 NEED FOR PROPHYLACTIC VACCINATION AND INOCULATION AGAINST INFLUENZA: Primary | ICD-10-CM

## 2021-11-30 PROCEDURE — G0008 ADMIN INFLUENZA VIRUS VAC: HCPCS

## 2021-11-30 PROCEDURE — 90662 IIV NO PRSV INCREASED AG IM: CPT

## 2021-11-30 PROCEDURE — 99207 PR NO CHARGE NURSE ONLY: CPT

## 2022-02-07 ENCOUNTER — TELEPHONE (OUTPATIENT)
Dept: INTERNAL MEDICINE | Facility: CLINIC | Age: 87
End: 2022-02-07
Payer: COMMERCIAL

## 2022-02-07 DIAGNOSIS — R06.09 DOE (DYSPNEA ON EXERTION): ICD-10-CM

## 2022-02-07 DIAGNOSIS — R06.01 ORTHOPNEA: ICD-10-CM

## 2022-02-07 NOTE — TELEPHONE ENCOUNTER
Patient is asking for this script to be filled by Dr Tello. He said he has been taking it it off and on. It is approved by Dr Annetta Nguyen but patient called IM.

## 2022-02-08 RX ORDER — FUROSEMIDE 20 MG
10 TABLET ORAL DAILY
Qty: 45 TABLET | Refills: 1 | Status: SHIPPED | OUTPATIENT
Start: 2022-02-08 | End: 2022-03-30

## 2022-02-08 NOTE — TELEPHONE ENCOUNTER
Call to patient. Patient informed of Dr. Tello's below response. Patient verbalized understanding and denies questions/concerns.     Katrin WRIGHT RN   [facility]

## 2022-02-08 NOTE — TELEPHONE ENCOUNTER
Furosemide (LASIX) 20 MG tablet (Take 0.5 tablets (10 mg) by mouth daily) was prescribed by cardiology, Dr. Littlejohn.     Routing to provider, do you want patient to reach out to cardiology for this refill or would you be able to do this?     Patient last saw Dr. Tello for office visit on 7/14/2021.    Patient last saw cardiology on 6/2/2020.     Pharmacy verified.     Katrin WRIGHT RN   [facility]

## 2022-03-15 ENCOUNTER — TRANSFERRED RECORDS (OUTPATIENT)
Dept: HEALTH INFORMATION MANAGEMENT | Facility: CLINIC | Age: 87
End: 2022-03-15
Payer: COMMERCIAL

## 2022-03-24 ENCOUNTER — TRANSFERRED RECORDS (OUTPATIENT)
Dept: HEALTH INFORMATION MANAGEMENT | Facility: CLINIC | Age: 87
End: 2022-03-24
Payer: COMMERCIAL

## 2022-03-24 LAB
ALT SERPL-CCNC: 19 IU/L (ref 5–40)
AST SERPL-CCNC: 24 U/L (ref 5–34)

## 2022-03-30 ENCOUNTER — OFFICE VISIT (OUTPATIENT)
Dept: INTERNAL MEDICINE | Facility: CLINIC | Age: 87
End: 2022-03-30
Payer: COMMERCIAL

## 2022-03-30 VITALS
WEIGHT: 208 LBS | DIASTOLIC BLOOD PRESSURE: 75 MMHG | SYSTOLIC BLOOD PRESSURE: 131 MMHG | TEMPERATURE: 98 F | OXYGEN SATURATION: 98 % | HEIGHT: 73 IN | RESPIRATION RATE: 18 BRPM | HEART RATE: 96 BPM | BODY MASS INDEX: 27.57 KG/M2

## 2022-03-30 DIAGNOSIS — C61 PROSTATE CANCER (H): ICD-10-CM

## 2022-03-30 DIAGNOSIS — R06.09 DOE (DYSPNEA ON EXERTION): ICD-10-CM

## 2022-03-30 DIAGNOSIS — M06.09 RHEUMATOID ARTHRITIS, SERONEGATIVE, MULTIPLE SITES (H): ICD-10-CM

## 2022-03-30 DIAGNOSIS — R06.01 ORTHOPNEA: ICD-10-CM

## 2022-03-30 DIAGNOSIS — I71.40 ABDOMINAL AORTIC ANEURYSM (AAA) 30 TO 34 MM IN DIAMETER (H): ICD-10-CM

## 2022-03-30 LAB
HBA1C MFR BLD: 5.2 % (ref 0–5.6)
NT-PROBNP SERPL-MCNC: 137 PG/ML (ref 0–450)

## 2022-03-30 PROCEDURE — 99214 OFFICE O/P EST MOD 30 MIN: CPT | Performed by: INTERNAL MEDICINE

## 2022-03-30 PROCEDURE — 83036 HEMOGLOBIN GLYCOSYLATED A1C: CPT | Performed by: INTERNAL MEDICINE

## 2022-03-30 PROCEDURE — 83880 ASSAY OF NATRIURETIC PEPTIDE: CPT | Performed by: INTERNAL MEDICINE

## 2022-03-30 PROCEDURE — 36415 COLL VENOUS BLD VENIPUNCTURE: CPT | Performed by: INTERNAL MEDICINE

## 2022-03-30 PROCEDURE — 80053 COMPREHEN METABOLIC PANEL: CPT | Performed by: INTERNAL MEDICINE

## 2022-03-30 PROCEDURE — 84443 ASSAY THYROID STIM HORMONE: CPT | Performed by: INTERNAL MEDICINE

## 2022-03-30 RX ORDER — FUROSEMIDE 20 MG
20 TABLET ORAL DAILY
Qty: 45 TABLET | Refills: 1 | COMMUNITY
Start: 2022-03-30 | End: 2024-02-13

## 2022-03-30 RX ORDER — HYDROXYCHLOROQUINE SULFATE 200 MG/1
400 TABLET, FILM COATED ORAL DAILY
Qty: 180 TABLET | Refills: 3 | COMMUNITY
Start: 2022-03-30

## 2022-03-30 ASSESSMENT — ENCOUNTER SYMPTOMS
UNEXPECTED WEIGHT CHANGE: 1
MUSCULOSKELETAL NEGATIVE: 1
NEUROLOGICAL NEGATIVE: 1
PALPITATIONS: 0
FATIGUE: 1
GASTROINTESTINAL NEGATIVE: 1
SHORTNESS OF BREATH: 1

## 2022-03-30 ASSESSMENT — ASTHMA QUESTIONNAIRES: ACT_TOTALSCORE: 25

## 2022-03-30 NOTE — PROGRESS NOTES
"  Assessment & Plan     Orthopnea and dyspnea on exertion and CHISHOLM (dyspnea on exertion)  At this time, patient does have a relatively unremarkable for his examination.  He does not have any signs suggestive of fluid overload noted on his physical examination.  Given his concerns, we did elect to proceed with evaluation for other potential causes of his symptoms.  He will have blood work collected today.  We will assess for any underlying glucose abnormalities given that he has been on chronic prednisone therapy.  Patient will also have his electrolytes evaluated to ensure that no electrolyte abnormalities could be contributing to symptoms.  His thyroid studies will be collected as well.  We will arrange for a follow-up echocardiogram to see if he has had any change in his ejection fraction since his last study approximately 2 years ago.  We will contact patient with results of his labs once they are available for review.  - furosemide (LASIX) 20 MG tablet  Dispense: 45 tablet; Refill: 1  - Hemoglobin A1c  - Comprehensive metabolic panel (BMP + Alb, Alk Phos, ALT, AST, Total. Bili, TP)  - TSH with free T4 reflex  - BNP-N terminal pro  - Echocardiogram Complete  - Hemoglobin A1c  - Comprehensive metabolic panel (BMP + Alb, Alk Phos, ALT, AST, Total. Bili, TP)  - TSH with free T4 reflex  - BNP-N terminal pro    Rheumatoid arthritis, seronegative, multiple sites (H)  Followed by his rheumatologist.    Abdominal aortic aneurysm (AAA) 30 to 34 mm in diameter (H)  Followed by vascular surgery.  Ultrasound has not shown any changes in the size of his aortic aneurysm.    Prostate cancer (H)  Followed by urology.      Ordering of each unique test  30 minutes spent on the date of the encounter doing chart review, history and exam, documentation and further activities per the note       BMI:   Estimated body mass index is 27.82 kg/m  as calculated from the following:    Height as of this encounter: 1.842 m (6' 0.5\").    Weight " as of this encounter: 94.3 kg (208 lb).       See Patient Instructions    No follow-ups on file.    Sheldon Nelson MD  [facility]HEL[facility]el is a 88 year old who presents for follow-up of his breathing issues.    Patient is an 88-year-old  male who presents to the clinic to follow-up on his breathing issues.  Patient reports that he has had a 15-year history of dyspnea and orthopnea.  Patient has had extensive evaluation for these issues in the past.  He did undergo an echocardiogram approximately 2 years ago that did not show any obvious cause of his symptoms.  His ejection fraction at that time was noted to be 60 to 65%.  Patient had not had any issues with lower extremity edema, but he had been put on furosemide 10 mg daily to see what effect this may have on his symptoms.  His dosage was increased to 20 mg recently.  Patient does feel that the medication has made some improvement in his symptoms, but he is concerned that he needs to have his heart reevaluated.  Patient does have a complicated past medical history that includes rheumatoid arthritis, and currently does take 2.5 mg of prednisone daily along with hydroxychloroquine.  Patient did recently see his rheumatologist who did repeat blood counts and other labs with no abnormalities noted.  Patient does report that he has gained approximately 5 pounds over the last 6 months, and he does feel that his abdomen is getting protuberant.  Patient is having regular bowel movements and urinating without issue.  He would like to have further labs to evaluate his symptoms.       Increased SOB & abdominal distention for 2 years.        Review of Systems   Constitutional: Positive for fatigue and unexpected weight change.   HENT: Negative.    Respiratory: Positive for shortness of breath.    Cardiovascular: Negative for palpitations and peripheral edema.   Gastrointestinal: Negative.    Genitourinary: Negative.   "  Musculoskeletal: Negative.    Neurological: Negative.             Objective    Blood pressure 131/75, pulse 96, temperature 98  F (36.7  C), temperature source Oral, resp. rate 18, height 1.842 m (6' 0.5\"), weight 94.3 kg (208 lb), SpO2 98 %.      Physical Exam  Vitals and nursing note reviewed.   Constitutional:       Appearance: Normal appearance.   HENT:      Head: Normocephalic and atraumatic.      Right Ear: Tympanic membrane, ear canal and external ear normal.      Left Ear: Tympanic membrane, ear canal and external ear normal.      Mouth/Throat:      Mouth: Mucous membranes are moist.      Pharynx: Oropharynx is clear.   Eyes:      Extraocular Movements: Extraocular movements intact.      Conjunctiva/sclera: Conjunctivae normal.      Pupils: Pupils are equal, round, and reactive to light.   Cardiovascular:      Rate and Rhythm: Normal rate and regular rhythm.      Pulses: Normal pulses.      Heart sounds: Normal heart sounds.   Pulmonary:      Effort: Pulmonary effort is normal.      Breath sounds: Normal breath sounds.   Abdominal:      General: Bowel sounds are normal.      Palpations: Abdomen is soft.   Musculoskeletal:         General: Normal range of motion.   Skin:     General: Skin is warm.      Capillary Refill: Capillary refill takes less than 2 seconds.   Neurological:      General: No focal deficit present.      Mental Status: He is alert and oriented to person, place, and time. Mental status is at baseline.        Diagnostic Test Results: CMP, hemoglobin A1c, TSH, and proBNP are pending.  Echocardiogram has been ordered.    "

## 2022-03-31 LAB
ALBUMIN SERPL-MCNC: 3.6 G/DL (ref 3.4–5)
ALP SERPL-CCNC: 51 U/L (ref 40–150)
ALT SERPL W P-5'-P-CCNC: 25 U/L (ref 0–70)
ANION GAP SERPL CALCULATED.3IONS-SCNC: 1 MMOL/L (ref 3–14)
AST SERPL W P-5'-P-CCNC: 16 U/L (ref 0–45)
BILIRUB SERPL-MCNC: 0.6 MG/DL (ref 0.2–1.3)
BUN SERPL-MCNC: 17 MG/DL (ref 7–30)
CALCIUM SERPL-MCNC: 9.1 MG/DL (ref 8.5–10.1)
CHLORIDE BLD-SCNC: 106 MMOL/L (ref 94–109)
CO2 SERPL-SCNC: 30 MMOL/L (ref 20–32)
CREAT SERPL-MCNC: 0.98 MG/DL (ref 0.66–1.25)
GFR SERPL CREATININE-BSD FRML MDRD: 74 ML/MIN/1.73M2
GLUCOSE BLD-MCNC: 136 MG/DL (ref 70–99)
POTASSIUM BLD-SCNC: 4.3 MMOL/L (ref 3.4–5.3)
PROT SERPL-MCNC: 7.2 G/DL (ref 6.8–8.8)
SODIUM SERPL-SCNC: 137 MMOL/L (ref 133–144)
TSH SERPL DL<=0.005 MIU/L-ACNC: 1.24 MU/L (ref 0.4–4)

## 2022-04-04 ENCOUNTER — DOCUMENTATION ONLY (OUTPATIENT)
Dept: LAB | Facility: CLINIC | Age: 87
End: 2022-04-04
Payer: COMMERCIAL

## 2022-04-04 DIAGNOSIS — C61 PROSTATE CANCER (H): Primary | ICD-10-CM

## 2022-04-04 NOTE — PROGRESS NOTES
Order placed for PSA. Lab notified.  ALEIDA Harman RN      Please place or confirm orders for upcoming lab appointment on 4/12/2022 Thank You.

## 2022-04-12 ENCOUNTER — LAB (OUTPATIENT)
Dept: LAB | Facility: CLINIC | Age: 87
End: 2022-04-12
Payer: COMMERCIAL

## 2022-04-12 DIAGNOSIS — C61 PROSTATE CANCER (H): ICD-10-CM

## 2022-04-12 PROCEDURE — 84153 ASSAY OF PSA TOTAL: CPT

## 2022-04-12 PROCEDURE — 36415 COLL VENOUS BLD VENIPUNCTURE: CPT

## 2022-04-13 DIAGNOSIS — C61 PROSTATE CANCER (H): Primary | ICD-10-CM

## 2022-04-13 LAB — PSA SERPL-MCNC: 0.02 UG/L (ref 0–4)

## 2022-04-14 ENCOUNTER — OFFICE VISIT (OUTPATIENT)
Dept: UROLOGY | Facility: CLINIC | Age: 87
End: 2022-04-14
Payer: COMMERCIAL

## 2022-04-14 VITALS
WEIGHT: 200 LBS | HEART RATE: 82 BPM | DIASTOLIC BLOOD PRESSURE: 74 MMHG | HEIGHT: 74 IN | BODY MASS INDEX: 25.67 KG/M2 | SYSTOLIC BLOOD PRESSURE: 102 MMHG | OXYGEN SATURATION: 98 %

## 2022-04-14 DIAGNOSIS — C61 PROSTATE CANCER (H): ICD-10-CM

## 2022-04-14 DIAGNOSIS — R39.12 BENIGN PROSTATIC HYPERPLASIA WITH WEAK URINARY STREAM: Primary | ICD-10-CM

## 2022-04-14 DIAGNOSIS — N40.1 BENIGN PROSTATIC HYPERPLASIA WITH WEAK URINARY STREAM: Primary | ICD-10-CM

## 2022-04-14 LAB
ALBUMIN UR-MCNC: NEGATIVE MG/DL
APPEARANCE UR: CLEAR
BILIRUB UR QL STRIP: NEGATIVE
COLOR UR AUTO: YELLOW
GLUCOSE UR STRIP-MCNC: NEGATIVE MG/DL
HGB UR QL STRIP: ABNORMAL
KETONES UR STRIP-MCNC: NEGATIVE MG/DL
LEUKOCYTE ESTERASE UR QL STRIP: NEGATIVE
NITRATE UR QL: NEGATIVE
PH UR STRIP: 5.5 [PH] (ref 5–7)
RESIDUAL VOLUME (RV) (EXTERNAL): 83
SP GR UR STRIP: 1.01 (ref 1–1.03)
UROBILINOGEN UR STRIP-ACNC: 0.2 E.U./DL

## 2022-04-14 PROCEDURE — 51798 US URINE CAPACITY MEASURE: CPT | Performed by: STUDENT IN AN ORGANIZED HEALTH CARE EDUCATION/TRAINING PROGRAM

## 2022-04-14 PROCEDURE — 99213 OFFICE O/P EST LOW 20 MIN: CPT | Mod: 25 | Performed by: STUDENT IN AN ORGANIZED HEALTH CARE EDUCATION/TRAINING PROGRAM

## 2022-04-14 PROCEDURE — 81003 URINALYSIS AUTO W/O SCOPE: CPT | Mod: QW | Performed by: STUDENT IN AN ORGANIZED HEALTH CARE EDUCATION/TRAINING PROGRAM

## 2022-04-14 RX ORDER — TAMSULOSIN HYDROCHLORIDE 0.4 MG/1
0.4 CAPSULE ORAL EVERY EVENING
Qty: 90 CAPSULE | Refills: 3 | Status: SHIPPED | OUTPATIENT
Start: 2022-04-14 | End: 2023-05-23

## 2022-04-14 RX ORDER — DUTASTERIDE 0.5 MG/1
1 CAPSULE, LIQUID FILLED ORAL DAILY
Qty: 90 CAPSULE | Refills: 3 | Status: SHIPPED | OUTPATIENT
Start: 2022-04-14 | End: 2023-05-23

## 2022-04-14 ASSESSMENT — PAIN SCALES - GENERAL: PAINLEVEL: NO PAIN (0)

## 2022-04-14 NOTE — NURSING NOTE
Chief Complaint   Patient presents with     Prostate Cancer     Here to review PSA AUA and PVR.   post void residual 83 ml  Yanira Hu LPN

## 2022-04-14 NOTE — PROGRESS NOTES
"CHIEF COMPLAINT   Jorge Salomon who is a 89 year old male returns today for follow-up of [place] 3+6 prostate cancer s/p radiation 2017       HPI   Jorge Salomon is a 89 year old male returns today for follow-up of [place] 3+6 prostate cancer s/p radiation 2017, BPH with LUTS on avodart and flomax    6/20/2008 [place] 3+3=6 in left apex  4/22/2009 [place] 3+3=6 in right apex  4/6/2010 benign     PIRADS 4 MRI 2017 with increase in size of lesion since 2013, with suspicion EPE     Radiation in 2017 w/o concurrent ADT    avodart and flomax, urinary symptoms well controlled  AUA symptom score 4-7-0-1-2-0-3 = 10 moderate    He was recently started on lasix for \"mild CHF\" which is increasing his urinary symptoms     Denies gross hematuria      PHYSICAL EXAM  Patient is a 89 year old  male   Vitals: Blood pressure 102/74, pulse 82, height 1.867 m (6' 1.5\"), weight 90.7 kg (200 lb), SpO2 98 %.  Body mass index is 26.03 kg/m .  General Appearance Adult:   Alert, no acute distress, oriented  HENT: throat/mouth:normal, good dentition  Lungs: no respiratory distress, or pursed lip breathing  Heart: No obvious jugular venous distension present  Abdomen: nondistended  Musculoskeltal: extremities normal, no peripheral edema  Skin: no suspicious lesions or rashes  Neuro: Alert, oriented, speech and mentation normal  Psych: affect and mood normal  Gait: Normal      Component PSA PSA Diag Urologic Phys   Latest Ref Rng & Units 0.00 - 4.00 ug/L 0.00 - 4.00 ng/mL   5/2/2003 2.2    11/12/2004 1.88    12/7/2005 2.44@    4/4/2008 3.34    5/1/2009 1.6@    5/2/2011 1.2    8/2/2016 1.12    1/6/2017  1.20   8/1/2017 0.66    1/30/2018 0.17    8/1/2018 0.18    1/28/2019 0.06    7/30/2019 0.04    2/4/2020 0.03    8/4/2020 0.02    7/14/2021 0.02    4/12/2022 0.02      PVR 83 ml    ASSESSMENT and PLAN  89 year old male returns today for follow-up of [place] 3+6 prostate cancer s/p radiation 2017, BPH with LUTS on avodart and flomax    PSA " remains low, no evidence of prostate cancer recurrence    Moderate LUTS on avodart and flomax. Emptying well based on PVR 83 ml    Continue avodart and flomax  Return 1 year with PSA, UA, PVR, AUA symptom score    Geremias Plunkett MD   [facility]rology  [facility] Phone: [phone]

## 2022-04-14 NOTE — LETTER
"4/14/2022       RE: Jorge Salomon  [address] [facility] [address]     Dear Colleague,    Thank you for referring your patient, Jorge Salomon, to the [facility] UROLOGY CLINIC [place] at [facility]. Please see a copy of my visit note below.    CHIEF COMPLAINT   Jorge Salomon who is a 89 year old male returns today for follow-up of [place] 3+6 prostate cancer s/p radiation 2017     HPI   Jorge Salomon is a 89 year old male returns today for follow-up of [place] 3+6 prostate cancer s/p radiation 2017, BPH with LUTS on avodart and flomax    6/20/2008 [place] 3+3=6 in left apex  4/22/2009 [place] 3+3=6 in right apex  4/6/2010 benign     PIRADS 4 MRI 2017 with increase in size of lesion since 2013, with suspicion EPE     Radiation in 2017 w/o concurrent ADT    avodart and flomax, urinary symptoms well controlled  AUA symptom score 8-8-8-1-2-0-3 = 10 moderate    He was recently started on lasix for \"mild CHF\" which is increasing his urinary symptoms     Denies gross hematuria    PHYSICAL EXAM  Patient is a 89 year old  male   Vitals: Blood pressure 102/74, pulse 82, height 1.867 m (6' 1.5\"), weight 90.7 kg (200 lb), SpO2 98 %.  Body mass index is 26.03 kg/m .  General Appearance Adult:   Alert, no acute distress, oriented  HENT: throat/mouth:normal, good dentition  Lungs: no respiratory distress, or pursed lip breathing  Heart: No obvious jugular venous distension present  Abdomen: nondistended  Musculoskeltal: extremities normal, no peripheral edema  Skin: no suspicious lesions or rashes  Neuro: Alert, oriented, speech and mentation normal  Psych: affect and mood normal  Gait: Normal      Component PSA PSA Diag Urologic Phys   Latest Ref Rng & Units 0.00 - 4.00 ug/L 0.00 - 4.00 ng/mL   5/2/2003 2.2    11/12/2004 1.88    12/7/2005 2.44@    4/4/2008 3.34    5/1/2009 1.6@    5/2/2011 1.2    8/2/2016 1.12    1/6/2017  1.20   8/1/2017 0.66  "   1/30/2018 0.17    8/1/2018 0.18    1/28/2019 0.06    7/30/2019 0.04    2/4/2020 0.03    8/4/2020 0.02    7/14/2021 0.02    4/12/2022 0.02      PVR 83 ml    ASSESSMENT and PLAN  89 year old male returns today for follow-up of [place] 3+6 prostate cancer s/p radiation 2017, BPH with LUTS on avodart and flomax    PSA remains low, no evidence of prostate cancer recurrence    Moderate LUTS on avodart and flomax. Emptying well based on PVR 83 ml    Continue avodart and flomax  Return 1 year with PSA, UA, PVR, AUA symptom score    Geremias Plunkett MD   [facility] Urology  [facility] Phone: [phone]

## 2022-04-27 ENCOUNTER — HOSPITAL ENCOUNTER (OUTPATIENT)
Dept: CARDIOLOGY | Facility: CLINIC | Age: 87
Discharge: HOME | End: 2022-04-27
Attending: INTERNAL MEDICINE | Admitting: INTERNAL MEDICINE
Payer: COMMERCIAL

## 2022-04-27 DIAGNOSIS — R06.09 DOE (DYSPNEA ON EXERTION): ICD-10-CM

## 2022-04-27 DIAGNOSIS — R06.01 ORTHOPNEA: ICD-10-CM

## 2022-04-27 LAB — LVEF ECHO: NORMAL

## 2022-04-27 PROCEDURE — 93306 TTE W/DOPPLER COMPLETE: CPT

## 2022-04-27 PROCEDURE — 93306 TTE W/DOPPLER COMPLETE: CPT | Mod: 26 | Performed by: INTERNAL MEDICINE

## 2022-05-18 ENCOUNTER — TRANSFERRED RECORDS (OUTPATIENT)
Dept: HEALTH INFORMATION MANAGEMENT | Facility: CLINIC | Age: 87
End: 2022-05-18
Payer: COMMERCIAL

## 2022-07-06 ENCOUNTER — TRANSFERRED RECORDS (OUTPATIENT)
Dept: INTERNAL MEDICINE | Facility: CLINIC | Age: 87
End: 2022-07-06

## 2022-07-14 ENCOUNTER — TRANSFERRED RECORDS (OUTPATIENT)
Dept: INTERNAL MEDICINE | Facility: CLINIC | Age: 87
End: 2022-07-14

## 2022-08-13 ENCOUNTER — HEALTH MAINTENANCE LETTER (OUTPATIENT)
Age: 87
End: 2022-08-13

## 2022-08-31 ENCOUNTER — VIRTUAL VISIT (OUTPATIENT)
Dept: URGENT CARE | Facility: CLINIC | Age: 87
End: 2022-08-31
Payer: COMMERCIAL

## 2022-08-31 DIAGNOSIS — U07.1 INFECTION DUE TO 2019 NOVEL CORONAVIRUS: Primary | ICD-10-CM

## 2022-08-31 PROCEDURE — 99213 OFFICE O/P EST LOW 20 MIN: CPT | Mod: CS

## 2022-10-30 ENCOUNTER — HEALTH MAINTENANCE LETTER (OUTPATIENT)
Age: 87
End: 2022-10-30

## 2022-11-11 ENCOUNTER — TRANSFERRED RECORDS (OUTPATIENT)
Dept: HEALTH INFORMATION MANAGEMENT | Facility: CLINIC | Age: 87
End: 2022-11-11

## 2022-12-27 ENCOUNTER — TRANSFERRED RECORDS (OUTPATIENT)
Dept: HEALTH INFORMATION MANAGEMENT | Facility: CLINIC | Age: 87
End: 2022-12-27

## 2022-12-27 LAB
ALT SERPL-CCNC: 14 IU/L (ref 5–40)
AST SERPL-CCNC: 15 U/L (ref 5–34)
CREATININE (EXTERNAL): 0.81 MG/DL (ref 0.5–1.3)

## 2023-01-13 DIAGNOSIS — R39.12 BENIGN PROSTATIC HYPERPLASIA WITH WEAK URINARY STREAM: Primary | ICD-10-CM

## 2023-01-13 DIAGNOSIS — N40.1 BENIGN PROSTATIC HYPERPLASIA WITH WEAK URINARY STREAM: Primary | ICD-10-CM

## 2023-04-13 DIAGNOSIS — R39.12 BENIGN PROSTATIC HYPERPLASIA WITH WEAK URINARY STREAM: Primary | ICD-10-CM

## 2023-04-13 DIAGNOSIS — N40.1 BENIGN PROSTATIC HYPERPLASIA WITH WEAK URINARY STREAM: Primary | ICD-10-CM

## 2023-04-13 DIAGNOSIS — C61 PROSTATE CANCER (H): ICD-10-CM

## 2023-04-28 ENCOUNTER — LAB (OUTPATIENT)
Dept: LAB | Facility: CLINIC | Age: 88
End: 2023-04-28
Payer: COMMERCIAL

## 2023-04-28 DIAGNOSIS — R39.12 BENIGN PROSTATIC HYPERPLASIA WITH WEAK URINARY STREAM: ICD-10-CM

## 2023-04-28 DIAGNOSIS — N40.1 BENIGN PROSTATIC HYPERPLASIA WITH WEAK URINARY STREAM: ICD-10-CM

## 2023-04-28 DIAGNOSIS — C61 PROSTATE CANCER (H): ICD-10-CM

## 2023-04-28 PROCEDURE — 36415 COLL VENOUS BLD VENIPUNCTURE: CPT

## 2023-04-28 PROCEDURE — 84153 ASSAY OF PSA TOTAL: CPT

## 2023-04-29 LAB — PSA SERPL DL<=0.01 NG/ML-MCNC: <0.01 NG/ML

## 2023-05-23 ENCOUNTER — OFFICE VISIT (OUTPATIENT)
Dept: UROLOGY | Facility: CLINIC | Age: 88
End: 2023-05-23
Payer: COMMERCIAL

## 2023-05-23 VITALS
DIASTOLIC BLOOD PRESSURE: 76 MMHG | BODY MASS INDEX: 25.84 KG/M2 | WEIGHT: 195 LBS | HEIGHT: 73 IN | SYSTOLIC BLOOD PRESSURE: 129 MMHG | HEART RATE: 79 BPM

## 2023-05-23 DIAGNOSIS — R39.12 BENIGN PROSTATIC HYPERPLASIA WITH WEAK URINARY STREAM: Primary | ICD-10-CM

## 2023-05-23 DIAGNOSIS — C61 PROSTATE CANCER (H): ICD-10-CM

## 2023-05-23 DIAGNOSIS — N40.1 BENIGN PROSTATIC HYPERPLASIA WITH WEAK URINARY STREAM: Primary | ICD-10-CM

## 2023-05-23 LAB
ALBUMIN UR-MCNC: NEGATIVE MG/DL
APPEARANCE UR: CLEAR
BILIRUB UR QL STRIP: NEGATIVE
COLOR UR AUTO: YELLOW
GLUCOSE UR STRIP-MCNC: NEGATIVE MG/DL
HGB UR QL STRIP: NEGATIVE
KETONES UR STRIP-MCNC: NEGATIVE MG/DL
LEUKOCYTE ESTERASE UR QL STRIP: NEGATIVE
NITRATE UR QL: NEGATIVE
PH UR STRIP: 6.5 [PH] (ref 5–7)
RESIDUAL VOLUME (RV) (EXTERNAL): 113
SP GR UR STRIP: 1.01 (ref 1–1.03)
UROBILINOGEN UR STRIP-ACNC: 0.2 E.U./DL

## 2023-05-23 PROCEDURE — 51798 US URINE CAPACITY MEASURE: CPT | Performed by: STUDENT IN AN ORGANIZED HEALTH CARE EDUCATION/TRAINING PROGRAM

## 2023-05-23 PROCEDURE — 99213 OFFICE O/P EST LOW 20 MIN: CPT | Mod: 25 | Performed by: STUDENT IN AN ORGANIZED HEALTH CARE EDUCATION/TRAINING PROGRAM

## 2023-05-23 PROCEDURE — 81003 URINALYSIS AUTO W/O SCOPE: CPT | Mod: QW | Performed by: STUDENT IN AN ORGANIZED HEALTH CARE EDUCATION/TRAINING PROGRAM

## 2023-05-23 RX ORDER — TAMSULOSIN HYDROCHLORIDE 0.4 MG/1
0.4 CAPSULE ORAL EVERY EVENING
Qty: 90 CAPSULE | Refills: 3 | Status: SHIPPED | OUTPATIENT
Start: 2023-05-23 | End: 2024-04-26

## 2023-05-23 RX ORDER — DUTASTERIDE 0.5 MG/1
1 CAPSULE, LIQUID FILLED ORAL DAILY
Qty: 90 CAPSULE | Refills: 3 | Status: SHIPPED | OUTPATIENT
Start: 2023-05-23 | End: 2024-04-26

## 2023-05-23 ASSESSMENT — PAIN SCALES - GENERAL: PAINLEVEL: NO PAIN (0)

## 2023-05-23 NOTE — NURSING NOTE
Chief Complaint   Patient presents with     Benign Prostatic Hypertrophy     Pt here for one year follow up     Prostate Cancer     PVR: 113 mL    Jackelyn Mejia CMA

## 2023-05-23 NOTE — PROGRESS NOTES
"CHIEF COMPLAINT   Jorge Salomon who is a 89 year old male returns today for follow-up of [place] 3+3 = 6 prostate cancer s/p radiation 2017, BPH with LUTS on avodart and flomax.      HPI   Jorge Salomon is a 89 year old male returns today for follow-up of [place] 3+3 = 6 prostate cancer s/p radiation 2017, BPH with LUTS on avodart and flomax.      AUA symptom score 3-1-2-1-1-0-3 = 10 mod qol mostly satisfied. Most bothersome issue is nocturia but combination of Sjogren's causing dry mouth and CPAP machine. He goes right back to sleep after urinating    Urinary symptoms are stable. Denies any gross hematuria    PHYSICAL EXAM  Patient is a 89 year old  male   Vitals: Blood pressure 129/76, pulse 79, height 1.842 m (6' 0.5\"), weight 88.5 kg (195 lb).  Body mass index is 26.08 kg/m .  General Appearance Adult:   Alert, no acute distress, oriented  HENT: throat/mouth:normal, good dentition  Lungs: no respiratory distress, or pursed lip breathing  Heart: No obvious jugular venous distension present  Abdomen: nondistended  Musculoskeltal: extremities normal, no peripheral edema  Skin: no suspicious lesions or rashes  Neuro: Alert, oriented, speech and mentation normal  Psych: affect and mood normal  Gait: Normal  : deferred        Component      Latest Ref Rng 4/28/2023  9:31 AM   PSA Tumor Marker      ng/mL <0.01         Latest Reference Range & Units 05/23/23 11:16   Color Urine Colorless, Straw, Light Yellow, Yellow  Yellow   Appearance Urine Clear  Clear   Glucose Urine Negative mg/dL Negative   Bilirubin Urine Negative  Negative   Ketones Urine Negative mg/dL Negative   Specific Gravity Urine 1.003 - 1.035  1.015   pH Urine 5.0 - 7.0  6.5   Protein Albumin Urine Negative mg/dL Negative   Urobilinogen Urine 0.2, 1.0 E.U./dL 0.2   Nitrite Urine Negative  Negative   Blood Urine Negative  Negative   Leukocyte Esterase Urine Negative  Negative        ml      ASSESSMENT and PLAN  89 year old male returns " today for follow-up of [place] 3+3 = 6 prostate cancer s/p radiation 2017, BPH with LUTS on avodart and flomax.      Stable urinary symptoms, moderately elevated PVR, normal UA today  PSA remains undetectable    - refill dutasteride and tamsulosin  - return 1 year with PSA prior for UA, PVR, AUA symptom score      Geremias Plunkett MD   [facility] Urology  [facility] Phone: [phone]

## 2023-05-23 NOTE — LETTER
"5/23/2023       RE: Jorge Salomon  [address] [facility] [address]     Dear Colleague,    Thank you for referring your patient, Jorge Salomon, to the [facility] UROLOGY CLINIC [place] at [facility]. Please see a copy of my visit note below.    CHIEF COMPLAINT   Jorge Salomon who is a 89 year old male returns today for follow-up of [place] 3+3 = 6 prostate cancer s/p radiation 2017, BPH with LUTS on avodart and flomax.      HPI   Jorge Salomon is a 89 year old male returns today for follow-up of [place] 3+3 = 6 prostate cancer s/p radiation 2017, BPH with LUTS on avodart and flomax.      AUA symptom score 8-6-5-1-1-0-3 = 10 mod qol mostly satisfied. Most bothersome issue is nocturia but combination of Sjogren's causing dry mouth and CPAP machine. He goes right back to sleep after urinating    Urinary symptoms are stable. Denies any gross hematuria    PHYSICAL EXAM  Patient is a 89 year old  male   Vitals: Blood pressure 129/76, pulse 79, height 1.842 m (6' 0.5\"), weight 88.5 kg (195 lb).  Body mass index is 26.08 kg/m .  General Appearance Adult:   Alert, no acute distress, oriented  HENT: throat/mouth:normal, good dentition  Lungs: no respiratory distress, or pursed lip breathing  Heart: No obvious jugular venous distension present  Abdomen: nondistended  Musculoskeltal: extremities normal, no peripheral edema  Skin: no suspicious lesions or rashes  Neuro: Alert, oriented, speech and mentation normal  Psych: affect and mood normal  Gait: Normal  : deferred        Component      Latest Ref Rng 4/28/2023  9:31 AM   PSA Tumor Marker      ng/mL <0.01         Latest Reference Range & Units 05/23/23 11:16   Color Urine Colorless, Straw, Light Yellow, Yellow  Yellow   Appearance Urine Clear  Clear   Glucose Urine Negative mg/dL Negative   Bilirubin Urine Negative  Negative   Ketones Urine Negative mg/dL Negative   Specific Gravity Urine " 1.003 - 1.035  1.015   pH Urine 5.0 - 7.0  6.5   Protein Albumin Urine Negative mg/dL Negative   Urobilinogen Urine 0.2, 1.0 E.U./dL 0.2   Nitrite Urine Negative  Negative   Blood Urine Negative  Negative   Leukocyte Esterase Urine Negative  Negative        ml      ASSESSMENT and PLAN  89 year old male returns today for follow-up of [place] 3+3 = 6 prostate cancer s/p radiation 2017, BPH with LUTS on avodart and flomax.      Stable urinary symptoms, moderately elevated PVR, normal UA today  PSA remains undetectable    - refill dutasteride and tamsulosin  - return 1 year with PSA prior for UA, PVR, AUA symptom score      Geremias Plunkett MD   [facility] Urology  [facility] Phone: [phone]

## 2023-06-21 ENCOUNTER — VIRTUAL VISIT (OUTPATIENT)
Dept: INTERNAL MEDICINE | Facility: CLINIC | Age: 88
End: 2023-06-21
Payer: COMMERCIAL

## 2023-06-21 DIAGNOSIS — J32.9 SINUSITIS, UNSPECIFIED CHRONICITY, UNSPECIFIED LOCATION: Primary | ICD-10-CM

## 2023-06-21 DIAGNOSIS — R05.1 ACUTE COUGH: ICD-10-CM

## 2023-06-21 PROCEDURE — 99213 OFFICE O/P EST LOW 20 MIN: CPT | Mod: 93

## 2023-06-21 RX ORDER — AMOXICILLIN 875 MG
875 TABLET ORAL 2 TIMES DAILY
Qty: 14 TABLET | Refills: 0 | Status: SHIPPED | OUTPATIENT
Start: 2023-06-21 | End: 2024-01-16

## 2023-06-21 RX ORDER — CODEINE PHOSPHATE AND GUAIFENESIN 10; 100 MG/5ML; MG/5ML
1-2 SOLUTION ORAL
Qty: 118 ML | Refills: 0 | Status: SHIPPED | OUTPATIENT
Start: 2023-06-21 | End: 2024-05-14

## 2023-06-21 RX ORDER — CODEINE PHOSPHATE AND GUAIFENESIN 10; 100 MG/5ML; MG/5ML
1-2 SOLUTION ORAL EVERY 4 HOURS PRN
Qty: 118 ML | Refills: 0 | Status: SHIPPED | OUTPATIENT
Start: 2023-06-21 | End: 2023-06-21

## 2023-06-21 NOTE — PROGRESS NOTES
"Jorge is a 89 year old who is being evaluated via a billable telephone visit.      What phone number would you like to be contacted at? [phone]  How would you like to obtain your AVS? Mail a copy  Distant Location (provider location):  On-site    Assessment & Plan     Sinusitis, unspecified chronicity, unspecified location  Symptoms ongoing for 10 days. Will treat with amoxicillin  - amoxicillin (AMOXIL) 875 MG tablet; Take 1 tablet (875 mg) by mouth 2 times daily  - guaiFENesin-codeine (ROBITUSSIN AC) 100-10 MG/5ML solution; Take 5-10 mLs by mouth every 4 hours as needed for cough    Acute cough  Patient has cough that is preventing him from sleeping at night- robitussin AC provided as it has worked for cough in the past. Use at bedtime  - amoxicillin (AMOXIL) 875 MG tablet; Take 1 tablet (875 mg) by mouth 2 times daily  - guaiFENesin-codeine (ROBITUSSIN AC) 100-10 MG/5ML solution; Take 5-10 mLs by mouth every 4 hours as needed for cough       BMI:   Estimated body mass index is 26.08 kg/m  as calculated from the following:    Height as of 5/23/23: 1.842 m (6' 0.5\").    Weight as of 5/23/23: 88.5 kg (195 lb).     Eliel Hanna NP  [facility]    Subjective   Jorge is a 89 year old, presenting for the following health issues:  Sinus Problem (He has a sinus for 10 days now. He has a minor sore throat, cough, and headaches. Covid tests were negative.)         No data to display              HPI     Stuff up head for 8-10 days. Yellow green mucous coming out. Feels fatigued. When he coughs he has some yellow/greenish    Was having some pain in sinuses a couple days ago    4 negative covid  No fever    Review of Systems   Constitutional, HEENT, cardiovascular, pulmonary, gi and gu systems are negative, except as otherwise noted.      Objective           Vitals:  No vitals were obtained today due to virtual visit.    Physical Exam   alert and no distress  PSYCH: Alert and oriented " times 3; coherent speech, normal   rate and volume, able to articulate logical thoughts, able   to abstract reason, no tangential thoughts, no hallucinations   or delusions  His affect is normal  RESP: No cough, no audible wheezing, able to talk in full sentences  Remainder of exam unable to be completed due to telephone visits      Phone call duration: 10 minutes

## 2023-06-26 ASSESSMENT — ASTHMA QUESTIONNAIRES
ACT_TOTALSCORE: 21
ACT_TOTALSCORE: 21
QUESTION_3 LAST FOUR WEEKS HOW OFTEN DID YOUR ASTHMA SYMPTOMS (WHEEZING, COUGHING, SHORTNESS OF BREATH, CHEST TIGHTNESS OR PAIN) WAKE YOU UP AT NIGHT OR EARLIER THAN USUAL IN THE MORNING: NOT AT ALL
QUESTION_2 LAST FOUR WEEKS HOW OFTEN HAVE YOU HAD SHORTNESS OF BREATH: MORE THAN ONCE A DAY
QUESTION_4 LAST FOUR WEEKS HOW OFTEN HAVE YOU USED YOUR RESCUE INHALER OR NEBULIZER MEDICATION (SUCH AS ALBUTEROL): NOT AT ALL
QUESTION_5 LAST FOUR WEEKS HOW WOULD YOU RATE YOUR ASTHMA CONTROL: COMPLETELY CONTROLLED
QUESTION_1 LAST FOUR WEEKS HOW MUCH OF THE TIME DID YOUR ASTHMA KEEP YOU FROM GETTING AS MUCH DONE AT WORK, SCHOOL OR AT HOME: NONE OF THE TIME

## 2023-06-28 ENCOUNTER — ANCILLARY PROCEDURE (OUTPATIENT)
Dept: GENERAL RADIOLOGY | Facility: CLINIC | Age: 88
End: 2023-06-28
Attending: INTERNAL MEDICINE
Payer: COMMERCIAL

## 2023-06-28 ENCOUNTER — OFFICE VISIT (OUTPATIENT)
Dept: INTERNAL MEDICINE | Facility: CLINIC | Age: 88
End: 2023-06-28
Payer: COMMERCIAL

## 2023-06-28 VITALS
HEART RATE: 98 BPM | TEMPERATURE: 98.5 F | OXYGEN SATURATION: 95 % | WEIGHT: 199 LBS | SYSTOLIC BLOOD PRESSURE: 128 MMHG | BODY MASS INDEX: 25.54 KG/M2 | HEIGHT: 74 IN | DIASTOLIC BLOOD PRESSURE: 64 MMHG | RESPIRATION RATE: 18 BRPM

## 2023-06-28 DIAGNOSIS — I71.40 ABDOMINAL AORTIC ANEURYSM (AAA) 30 TO 34 MM IN DIAMETER (H): ICD-10-CM

## 2023-06-28 DIAGNOSIS — D64.9 ANEMIA, UNSPECIFIED TYPE: ICD-10-CM

## 2023-06-28 DIAGNOSIS — M06.09 RHEUMATOID ARTHRITIS, SERONEGATIVE, MULTIPLE SITES (H): ICD-10-CM

## 2023-06-28 DIAGNOSIS — R06.09 DOE (DYSPNEA ON EXERTION): Primary | ICD-10-CM

## 2023-06-28 DIAGNOSIS — R53.83 FATIGUE, UNSPECIFIED TYPE: ICD-10-CM

## 2023-06-28 DIAGNOSIS — R05.1 ACUTE COUGH: ICD-10-CM

## 2023-06-28 LAB
ALBUMIN SERPL BCG-MCNC: 4.1 G/DL (ref 3.5–5.2)
ALP SERPL-CCNC: 63 U/L (ref 40–129)
ALT SERPL W P-5'-P-CCNC: 29 U/L (ref 0–70)
ANION GAP SERPL CALCULATED.3IONS-SCNC: 7 MMOL/L (ref 7–15)
AST SERPL W P-5'-P-CCNC: 20 U/L (ref 0–45)
BASOPHILS # BLD AUTO: 0 10E3/UL (ref 0–0.2)
BASOPHILS NFR BLD AUTO: 0 %
BILIRUB SERPL-MCNC: 0.3 MG/DL
BUN SERPL-MCNC: 17 MG/DL (ref 8–23)
CALCIUM SERPL-MCNC: 9.4 MG/DL (ref 8.2–9.6)
CHLORIDE SERPL-SCNC: 99 MMOL/L (ref 98–107)
CREAT SERPL-MCNC: 0.94 MG/DL (ref 0.67–1.17)
DEPRECATED HCO3 PLAS-SCNC: 28 MMOL/L (ref 22–29)
EOSINOPHIL # BLD AUTO: 0.1 10E3/UL (ref 0–0.7)
EOSINOPHIL NFR BLD AUTO: 1 %
ERYTHROCYTE [DISTWIDTH] IN BLOOD BY AUTOMATED COUNT: 12.2 % (ref 10–15)
GFR SERPL CREATININE-BSD FRML MDRD: 77 ML/MIN/1.73M2
GLUCOSE SERPL-MCNC: 93 MG/DL (ref 70–99)
HCT VFR BLD AUTO: 37.5 % (ref 40–53)
HGB BLD-MCNC: 12.8 G/DL (ref 13.3–17.7)
IMM GRANULOCYTES # BLD: 0.1 10E3/UL
IMM GRANULOCYTES NFR BLD: 1 %
LYMPHOCYTES # BLD AUTO: 0.8 10E3/UL (ref 0.8–5.3)
LYMPHOCYTES NFR BLD AUTO: 14 %
MCH RBC QN AUTO: 34.9 PG (ref 26.5–33)
MCHC RBC AUTO-ENTMCNC: 34.1 G/DL (ref 31.5–36.5)
MCV RBC AUTO: 102 FL (ref 78–100)
MONOCYTES # BLD AUTO: 1.1 10E3/UL (ref 0–1.3)
MONOCYTES NFR BLD AUTO: 19 %
NEUTROPHILS # BLD AUTO: 3.7 10E3/UL (ref 1.6–8.3)
NEUTROPHILS NFR BLD AUTO: 65 %
PLATELET # BLD AUTO: 223 10E3/UL (ref 150–450)
POTASSIUM SERPL-SCNC: 4.3 MMOL/L (ref 3.4–5.3)
PROT SERPL-MCNC: 7.5 G/DL (ref 6.4–8.3)
RBC # BLD AUTO: 3.67 10E6/UL (ref 4.4–5.9)
SODIUM SERPL-SCNC: 134 MMOL/L (ref 136–145)
TSH SERPL DL<=0.005 MIU/L-ACNC: 1.72 UIU/ML (ref 0.3–4.2)
WBC # BLD AUTO: 5.7 10E3/UL (ref 4–11)

## 2023-06-28 PROCEDURE — 85025 COMPLETE CBC W/AUTO DIFF WBC: CPT | Performed by: INTERNAL MEDICINE

## 2023-06-28 PROCEDURE — 83540 ASSAY OF IRON: CPT | Performed by: INTERNAL MEDICINE

## 2023-06-28 PROCEDURE — 36415 COLL VENOUS BLD VENIPUNCTURE: CPT | Performed by: INTERNAL MEDICINE

## 2023-06-28 PROCEDURE — 84443 ASSAY THYROID STIM HORMONE: CPT | Performed by: INTERNAL MEDICINE

## 2023-06-28 PROCEDURE — 83550 IRON BINDING TEST: CPT | Performed by: INTERNAL MEDICINE

## 2023-06-28 PROCEDURE — 82607 VITAMIN B-12: CPT | Performed by: INTERNAL MEDICINE

## 2023-06-28 PROCEDURE — 99214 OFFICE O/P EST MOD 30 MIN: CPT | Performed by: INTERNAL MEDICINE

## 2023-06-28 PROCEDURE — 80053 COMPREHEN METABOLIC PANEL: CPT | Performed by: INTERNAL MEDICINE

## 2023-06-28 PROCEDURE — 71046 X-RAY EXAM CHEST 2 VIEWS: CPT | Mod: TC | Performed by: RADIOLOGY

## 2023-06-28 ASSESSMENT — ENCOUNTER SYMPTOMS: COUGH: 1

## 2023-06-28 NOTE — PROGRESS NOTES
"Face to face time with patient: 34 minutes (1548---1622)     Assessment & Plan     (R06.09) CHISHOLM (dyspnea on exertion)  (primary encounter diagnosis)  Comment: Patient has a hx of chronic CHISHOLM. CXR shows no acute infiltrate.Labs show mild anemia as previous, no acute abnormalities. No pneumonia of CHF on chest x-ray today.   Recommended to patient that he follow up with pulmonology for evaluation of CHISHOLM specifically, not for SHREYAS.  Plan: **CBC with platelets differential FUTURE 2mo,         XR Chest 2 Views, Adult Pulmonary Medicine         Referral          (R53.83) Fatigue, unspecified type  Comment: Mild anemia as previous. Normal thyroid status. SHREYAS could possibly be suboptimally treated.   Plan: **Comprehensive metabolic panel FUTURE 14d,         **TSH with free T4 reflex FUTURE 2mo, **CBC         with platelets differential FUTURE 2mo          (M06.09) Rheumatoid arthritis, seronegative, multiple sites (H)  Comment: Stable. Continue current measures.   Plan:     (I71.40) Abdominal aortic aneurysm (AAA) 30 to 34 mm in diameter (H)  Comment: Stable.     (R05.1) Acute cough  No clear indication for extending anitbiotics.     (D64.9) Anemia, unspecified type  Comment: Will add B12 and iron studies to today's labs.   Plan: Iron and iron binding capacity, Vitamin B12           BMI:   Estimated body mass index is 25.9 kg/m  as calculated from the following:    Height as of this encounter: 1.867 m (6' 1.5\").    Weight as of this encounter: 90.3 kg (199 lb).     Patient Instructions   We will assure no pneumonia or rib fracture on chest-ray.     We will also update some lab tests.     If no answers on these tests for chronic shortness of breath with activity, recommend seeing Pulmonary in their office, emphasizing that the visit is for SHORTNESS OF BREATH WITH ACTIVITY, and NOT for sleep apnea follow up.     Recommend scheduling an Annual Wellness Visit in the next few months.           The information in this document, " created by the physician assistant student for me, accurately reflects the services I personally performed and the decisions made by me. I have reviewed and approved this document for accuracy prior to leaving the patient care area.  June 28, 2023 4:21 PM    Srinivasa Tello MD,   [facility] ELYSSA Patel is a 89 year old, presenting for the following health issues:  Cough (Fatigue/Shortness of breath) and Fall        6/28/2023     3:19 PM   Additional Questions   Roomed by Leah MG CMA     Cough    Fall  Associated symptoms include coughing.   History of Present Illness       Reason for visit:  General malaise, haven't seen him for over a year    He eats 2-3 servings of fruits and vegetables daily.He consumes 2 sweetened beverage(s) daily.He exercises with enough effort to increase his heart rate 20 to 29 minutes per day.  He exercises with enough effort to increase his heart rate 3 or less days per week.   He is taking medications regularly.     Chest soreness  Notes bilateral chest soreness. Notes that he fell 1 week ago in the garage and fell on his front chest and hands. His ribs have been sore since, left more so than right. He is interested in a chest x-ray today.     CHISHOLM  Patient reports fatigue worse the past 2 months. Fatigue and SOB worse with walking. Discussed and reviewed that patient has been seen by cardiology in the past (5/2020) for CHISHOLM, orthopnea, and fatigue. Echo then showed no obvious cause for symptoms.    He was recommended a small dose of diuretics (furosemide 10 mg daily), which he is not currently on. He is unsure if it was beneficial. Notes that weight has been stable. He checks his weight frequently. Denies any chest pain, palpitations, or PAULETTE. He has also been seen previously by pulmonology and they attributed his fatigue to SHREYAS. He has not been evaluated for SOB by pulmonology. Patient denies any smoking hx.     Of note, a more recent echocardiogram  "ordered by Dr Nelson in April 2022 for orthopnea and dyspnea on exertion showed an LVEF of 55-60 percent, with trace AI, moderate dilation of the aortic root and ascending aorta.     Patient does report having  Recent cough. Notes that he is currently taking amoxicillin for 1 week for sinusitis. Sinusitis symptoms have been improving with the abx. Cough is also improving. Denies any fevers/chills.     Past medical, family and social histories as well as medications reviewed and updated as needed.    Review of Systems   Respiratory: Positive for cough.      No chest discomfort, dizziness or palpitations. No diarrhea, abdominal pain or rectal bleeding.   No acute problems with vision or speech, lateralizing weakness or paresthesias.    ROS: as above or negative for Respiratory, CV, GI, endocrine, neuro systems.    This document serves as a record of the services and decisions personally performed and made by Srinivasa Tello MD. It was created on his behalf by Carrie Mckee, a physician assistant student. The creation of this document is based on the provider's statements to the student.  June 28, 2023 3:48 PM        Objective    /64 (BP Location: Left arm, Patient Position: Sitting, Cuff Size: Adult Large)   Pulse 98   Temp 98.5  F (36.9  C) (Tympanic)   Resp 18   Ht 1.867 m (6' 1.5\")   Wt 90.3 kg (199 lb)   SpO2 95%   BMI 25.90 kg/m    Body mass index is 25.9 kg/m .     Physical Exam   GENERAL: healthy, alert and no distress  EYES: Eyes grossly normal to inspection, PERRL and conjunctivae and sclerae normal  HENT: ear canals and TM's normal, nose and mouth without ulcers or lesions  NECK: no adenopathy, no asymmetry, masses, or scars and thyroid normal to palpation  RESP: occasional fine crackles noted in posterior bases, left worse than right otherwise lungs clear to auscultation - no rhonchi or wheezes.  CV: Occasional irregular beats, otherwise regular rate and rhythm, normal S1 S2, no S3 or S4, no " murmur, click or rub, no peripheral edema and peripheral pulses strong  ABDOMEN: soft, nontender, no hepatosplenomegaly, no masses and bowel sounds normal  MS: no gross musculoskeletal defects noted, no edema  SKIN: no suspicious lesions or rashes  NEURO: Normal strength and tone, mentation intact and speech normal  PSYCH: mentation appears normal, affect normal/bright    Labs reviewed in Epic.   Component      Latest Ref [facility] 6/28/2023  4:40 PM   WBC      4.0 - 11.0 10e3/uL 5.7    RBC Count      4.40 - 5.90 10e6/uL 3.67 (L)    Hemoglobin      13.3 - 17.7 g/dL 12.8 (L)    Hematocrit      40.0 - 53.0 % 37.5 (L)    MCV      78 - 100 fL 102 (H)    MCH      26.5 - 33.0 pg 34.9 (H)    MCHC      31.5 - 36.5 g/dL 34.1    RDW      10.0 - 15.0 % 12.2    Platelet Count      150 - 450 10e3/uL 223    % Neutrophils      % 65    % Lymphocytes      % 14    % Monocytes      % 19    % Eosinophils      % 1    % Basophils      % 0    % Immature Granulocytes      % 1    Absolute Neutrophils      1.6 - 8.3 10e3/uL 3.7    Absolute Lymphocytes      0.8 - 5.3 10e3/uL 0.8    Absolute Monocytes      0.0 - 1.3 10e3/uL 1.1    Absolute Eosinophils      0.0 - 0.7 10e3/uL 0.1    Absolute Basophils      0.0 - 0.2 10e3/uL 0.0    Absolute Immature Granulocytes      <=0.4 10e3/uL 0.1    Sodium      136 - 145 mmol/L 134 (L)    Potassium      3.4 - 5.3 mmol/L 4.3    Chloride      98 - 107 mmol/L 99    Carbon Dioxide (CO2)      22 - 29 mmol/L 28    Anion Gap      7 - 15 mmol/L 7    Urea Nitrogen      8.0 - 23.0 mg/dL 17.0    Creatinine      0.67 - 1.17 mg/dL 0.94    Calcium      8.2 - 9.6 mg/dL 9.4    Glucose      70 - 99 mg/dL 93    Alkaline Phosphatase      40 - 129 U/L 63    AST      0 - 45 U/L 20    ALT      0 - 70 U/L 29    Protein Total      6.4 - 8.3 g/dL 7.5    Albumin      3.5 - 5.2 g/dL 4.1    Bilirubin Total      <=1.2 mg/dL 0.3    GFR Estimate      >60 mL/min/1.73m2 77    Iron      61 - 157 ug/dL 58 (L)    Iron Binding Capacity      240 - 430  ug/dL 196 (L)    Iron Sat Index      15 - 46 % 30    TSH      0.30 - 4.20 uIU/mL 1.72    Vitamin B12      232 - 1,245 pg/mL 806       Legend:  (L) Low  (H) High

## 2023-06-28 NOTE — PATIENT INSTRUCTIONS
We will assure no pneumonia or rib fracture on chest-ray.     We will also update some lab tests.     If no answers on these tests for chronic shortness of breath with activity, recommend seeing Pulmonary in their office, emphasizing that the visit is for SHORTNESS OF BREATH WITH ACTIVITY, and NOT for sleep apnea follow up.     Recommend scheduling an Annual Wellness Visit in the next few months.

## 2023-06-29 LAB
IRON BINDING CAPACITY (ROCHE): 196 UG/DL (ref 240–430)
IRON SATN MFR SERPL: 30 % (ref 15–46)
IRON SERPL-MCNC: 58 UG/DL (ref 61–157)
VIT B12 SERPL-MCNC: 806 PG/ML (ref 232–1245)

## 2023-08-15 ENCOUNTER — TRANSFERRED RECORDS (OUTPATIENT)
Dept: HEALTH INFORMATION MANAGEMENT | Facility: CLINIC | Age: 88
End: 2023-08-15
Payer: COMMERCIAL

## 2023-08-24 ENCOUNTER — TELEPHONE (OUTPATIENT)
Dept: INTERNAL MEDICINE | Facility: CLINIC | Age: 88
End: 2023-08-24
Payer: COMMERCIAL

## 2023-08-24 NOTE — TELEPHONE ENCOUNTER
Reason for Call:  Appointment Request    Patient requesting this type of appt: Chronic Diease Management/Medication/Follow-Up    Requested provider: Srinivasa Tello    Reason patient unable to be scheduled: Not within requested timeframe    When does patient want to be seen/preferred time:  After October 2nd    Comments: Patient called to have a follow up appt with Elisha for test results. He would like for the appt to be after October 2nd with Elisha, who is scheduled out until December. Patient would like to be seen sooner than the scheduled appt he has on 12/14/23. Please call to reschedule with something sooner. Thank you.    Could we send this information to you in [employer] or would you prefer to receive a phone call?:   Patient would prefer a phone call   Okay to leave a detailed message?: Yes at Home number on file [phone] (home)    Call taken on 8/24/2023 at 2:45 PM by Saba Vazquez

## 2023-09-01 ENCOUNTER — HOSPITAL ENCOUNTER (OUTPATIENT)
Dept: CT IMAGING | Facility: CLINIC | Age: 88
Discharge: HOME | End: 2023-09-01
Attending: INTERNAL MEDICINE | Admitting: INTERNAL MEDICINE
Payer: COMMERCIAL

## 2023-09-01 DIAGNOSIS — R06.00 DYSPNEA: ICD-10-CM

## 2023-09-01 DIAGNOSIS — M35.00 SICCA SYNDROME (H): ICD-10-CM

## 2023-09-01 DIAGNOSIS — M06.9 RHEUMATOID ARTHRITIS, UNSPECIFIED (H): ICD-10-CM

## 2023-09-01 PROCEDURE — 71250 CT THORAX DX C-: CPT

## 2023-09-03 ENCOUNTER — HEALTH MAINTENANCE LETTER (OUTPATIENT)
Age: 88
End: 2023-09-03

## 2023-10-02 ENCOUNTER — TRANSFERRED RECORDS (OUTPATIENT)
Dept: HEALTH INFORMATION MANAGEMENT | Facility: CLINIC | Age: 88
End: 2023-10-02
Payer: COMMERCIAL

## 2023-10-27 ENCOUNTER — TRANSFERRED RECORDS (OUTPATIENT)
Dept: HEALTH INFORMATION MANAGEMENT | Facility: CLINIC | Age: 88
End: 2023-10-27

## 2023-10-27 ENCOUNTER — OFFICE VISIT (OUTPATIENT)
Dept: INTERNAL MEDICINE | Facility: CLINIC | Age: 88
End: 2023-10-27
Payer: COMMERCIAL

## 2023-10-27 VITALS
HEART RATE: 68 BPM | BODY MASS INDEX: 25.41 KG/M2 | SYSTOLIC BLOOD PRESSURE: 124 MMHG | RESPIRATION RATE: 16 BRPM | TEMPERATURE: 98.4 F | OXYGEN SATURATION: 96 % | WEIGHT: 198 LBS | HEIGHT: 74 IN | DIASTOLIC BLOOD PRESSURE: 64 MMHG

## 2023-10-27 DIAGNOSIS — R06.09 DOE (DYSPNEA ON EXERTION): Primary | ICD-10-CM

## 2023-10-27 DIAGNOSIS — I50.32 CHRONIC DIASTOLIC HEART FAILURE (H): ICD-10-CM

## 2023-10-27 DIAGNOSIS — M06.09 RHEUMATOID ARTHRITIS, SERONEGATIVE, MULTIPLE SITES (H): ICD-10-CM

## 2023-10-27 DIAGNOSIS — I25.10 CORONARY ARTERY CALCIFICATION SEEN ON CAT SCAN: ICD-10-CM

## 2023-10-27 LAB
ALT SERPL-CCNC: 15 IU/L (ref 5–40)
AST SERPL-CCNC: 17 U/L (ref 5–34)
CREATININE (EXTERNAL): 0.92 MG/DL (ref 0.5–1.3)
GFR ESTIMATED (IF AFRICAN AMERICAN) (EXTERNAL): NORMAL

## 2023-10-27 PROCEDURE — G0008 ADMIN INFLUENZA VIRUS VAC: HCPCS | Performed by: INTERNAL MEDICINE

## 2023-10-27 PROCEDURE — 99215 OFFICE O/P EST HI 40 MIN: CPT | Mod: 25 | Performed by: INTERNAL MEDICINE

## 2023-10-27 PROCEDURE — 90662 IIV NO PRSV INCREASED AG IM: CPT | Performed by: INTERNAL MEDICINE

## 2023-10-27 RX ORDER — RESPIRATORY SYNCYTIAL VIRUS VACCINE 120MCG/0.5
0.5 KIT INTRAMUSCULAR ONCE
Qty: 1 EACH | Refills: 0 | Status: CANCELLED | OUTPATIENT
Start: 2023-10-27 | End: 2023-10-27

## 2023-10-27 NOTE — PROGRESS NOTES
Face to face time with patient: 40 minutes (1125---1205)     Assessment & Plan   (R06.09) CHISHOLM (dyspnea on exertion)  (primary encounter diagnosis)  Comment: Concern for late effects of rheumatoid arthritis invoked by pulmonary in consultation. Agreed to cardiology consultation to address concerns noted below.   Plan: Adult Cardiology Eval  Referral          (M06.09) Rheumatoid arthritis, seronegative, multiple sites (H)  Comment: Continue to follow with rheumatology as planned.     (I50.32) Chronic diastolic heart failure (H)  Comment: Discussed potential contribution to dyspnea. Cardiology consultation.   Plan: Adult Cardiology Eval  Referral          (I25.10) Coronary artery calcification seen on CAT scan  Comment: See detailed discussion above. Cardiology consultation.   Plan: Adult Cardiology Eval  Referral             Patient Instructions   For the shortness of breath with activity:    Let's get cardiology input. I've placed the order for someone to call you to schedule an appointment with Dr Littlejohn.   Other factors already addressed--possible rheumatoid arthritis effect upon the lungs, possible diastolic heart failure. Probable component of deconditioning over time.       Srinivasa Tello MD,   [facility] is a 89 year old, presenting for the following health issues:  Follow Up (Discuss recent pulmonary tests)      10/27/2023    10:29 AM   Additional Questions   Roomed by Leah MG CMA       History of Present Illness       Reason for visit:  Review last test results+ review recent CT scan of chest showing mild pullmonary fibrosis + severe coronary calcification    He eats 2-3 servings of fruits and vegetables daily.He consumes 0 sweetened beverage(s) daily.He exercises with enough effort to increase his heart rate 20 to 29 minutes per day.  He exercises with enough effort to increase his heart rate 3 or less days per week.   He is taking  "medications regularly.     At the patient's last appointment with me on 6/28/2023 he was referred to pulmonary medicine for chronic dyspnea on exertion.     We reviewed results of his Pulmonary consultation, PFT's, and high resolution chest CT. We also reviewed results of his cardiac echo from 4/27/2023.    I have records available from Dr Madison's pulmonary consultation on 8/15. He reports seeing her in more recent follow up.  From her 8/15 consultation, PFT's showed moderate restrictive physiology. She suspected that his dyspnea may be related to effects from rheumatoid arthritis. A high-resolution chest CT was ordered.     He reports from his more recent pulmonary follow up that Dr Madison did feel as though late or chronic effects of rheumatoid arthritis was contributing to dyspnea on exertion, and that treatment is directed toward treating his rheumatoid arthritis with his rheumatologist.   He will be seeing Dr Sahu this afternoon.     The patient asks how rheumatoid arthritis might be contributing to his symptoms when his inflammatory markers and musculoskeletal symptoms are controlled. We discussed that he may have late effects from rheumatoid arthritis causing pulmonary symptoms.     He asks about a cardiac contributor, noting specifically a reference by Dr Madison to \"diastolic heart failure\". We reviewed that he may have an element of diastolic dysfunction on the basis of previous echocardiogram. His systolic function has been fine.     He also asks about the incidental finding of coronary artery calcification on his chest CT. We discussed that this may be a footprint of coronary artery disease, and that when a dedicated CT coronary artery calcium scan is ordered for a quantitative Agatston score, that treatment is generally aimed at prevention and optimizing treatable risk factors.     We have done this over the years--his LDL-Cholesterol has been controlled, he is normotensive, a nonsmoker. The fact " "that he is 89 years of age with no history of obvious angina or coronary artery disease requiring stent or CABG would appear to be evidence that these measures have been successful.     However, we agreed that cardiology could better address these questions of \"diastolic heart failure\" and incidental coronary artery calcification, and a cardiology consultation is ordered.     Past medical, family and social histories as well as medications reviewed and updated as needed.    Review of Systems   REVIEW OF SYSTEMS: The following systems have been completely reviewed and are negative except as noted above:   Constitutional, HEENT, respiratory, cardiovascular, musculoskeletal, and neurologic systems.        Objective    /64 (BP Location: Left arm, Patient Position: Sitting, Cuff Size: Adult Large)   Pulse 96   Temp 98.4  F (36.9  C) (Oral)   Resp 16   Ht 1.867 m (6' 1.5\")   Wt 89.8 kg (198 lb)   SpO2 96%   BMI 25.77 kg/m    Body mass index is 25.77 kg/m .    Physical Exam   GENERAL: healthy, alert and no distress  RESP: lungs clear to auscultation - no rales, rhonchi or wheezes  CV: regular rate and rhythm, normal S1 S2, no S3 or S4, no murmur, click or rub, no peripheral edema and peripheral pulses strong  MS: no gross musculoskeletal defects noted, no edema        "

## 2023-10-27 NOTE — PATIENT INSTRUCTIONS
For the shortness of breath with activity:    Let's get cardiology input. I've placed the order for someone to call you to schedule an appointment with Dr Littlejohn.   Other factors already addressed--possible rheumatoid arthritis effect upon the lungs, possible diastolic heart failure. Probable component of deconditioning over time.

## 2023-11-13 ENCOUNTER — TRANSFERRED RECORDS (OUTPATIENT)
Dept: HEALTH INFORMATION MANAGEMENT | Facility: CLINIC | Age: 88
End: 2023-11-13
Payer: COMMERCIAL

## 2024-01-16 ENCOUNTER — OFFICE VISIT (OUTPATIENT)
Dept: CARDIOLOGY | Facility: CLINIC | Age: 89
End: 2024-01-16
Payer: COMMERCIAL

## 2024-01-16 VITALS
WEIGHT: 204 LBS | OXYGEN SATURATION: 97 % | HEIGHT: 74 IN | SYSTOLIC BLOOD PRESSURE: 138 MMHG | HEART RATE: 81 BPM | BODY MASS INDEX: 26.18 KG/M2 | DIASTOLIC BLOOD PRESSURE: 72 MMHG

## 2024-01-16 DIAGNOSIS — I25.10 CORONARY ARTERY CALCIFICATION SEEN ON CAT SCAN: ICD-10-CM

## 2024-01-16 DIAGNOSIS — I50.32 CHRONIC DIASTOLIC HEART FAILURE (H): ICD-10-CM

## 2024-01-16 DIAGNOSIS — R06.09 DOE (DYSPNEA ON EXERTION): ICD-10-CM

## 2024-01-16 PROCEDURE — 99215 OFFICE O/P EST HI 40 MIN: CPT | Performed by: INTERNAL MEDICINE

## 2024-01-16 PROCEDURE — 93000 ELECTROCARDIOGRAM COMPLETE: CPT | Performed by: INTERNAL MEDICINE

## 2024-01-16 RX ORDER — FUROSEMIDE 20 MG
20 TABLET ORAL DAILY
Qty: 90 TABLET | Refills: 3 | Status: SHIPPED | OUTPATIENT
Start: 2024-01-16 | End: 2024-07-12

## 2024-01-16 NOTE — PROGRESS NOTES
"Cardiology Progress Note          Assessment and Plan:       Coronary calcification and dyspnea on exertion  Plan stress echocardiogram.  This will also help us evaluate if he has progressive block or symptomatic bradycardia on ECG tracings.  Follow-up in 1 month with me per patient request to review results.      40 minutes was spent with the patient, precharting and reviewing tests as well as post visit charting all done today..    This note was transcribed using electronic voice recognition software and there may be typographical errors present.                    Interval History:     The patient is a very pleasant 89 year old retired  who is very mentally sharp.  We discussed a number of concerns he had regarding his past diagnoses and echocardiogram.  He feels like his dyspnea may have improved a little bit when he was taking the furosemide 20 mg daily.    Has had slight worsening of dyspnea on exertion over the years but nothing abrupt.  He still works out at the [facility] lifting weights and on treadmill.    He has coronary calcifications on recent CT.  He has 4.4 cm ascending aorta that has been relatively stable.  He denies any chest discomfort.    ECG in the office has sinus rhythm with possible blocked PACs.  He does not feel these.  I can auscultate them on physical exam.  He denies any presyncope or syncope.                     Review of Systems:     Review of Systems:  Skin:        Eyes:       ENT:       Respiratory:  Positive for dyspnea on exertion;sleep apnea;CPAP  Cardiovascular:  Negative    Gastroenterology:      Genitourinary:       Musculoskeletal:       Neurologic:       Psychiatric:       Heme/Lymph/Imm:       Endocrine:                   Physical Exam:     Vitals: /72 (BP Location: Right arm, Patient Position: Sitting, Cuff Size: Adult Regular)   Pulse 81   Ht 1.867 m (6' 1.5\")   Wt 92.5 kg (204 lb)   SpO2 97%   BMI 26.55 kg/m    Constitutional:  cooperative, alert and " oriented, well developed, well nourished, in no acute distress        Skin:  warm and dry to the touch, no apparent skin lesions or masses noted        Head:  normocephalic, no masses or lesions        Eyes:  pupils equal and round, conjunctivae and lids unremarkable, sclera white, no xanthalasma, EOMS intact, no nystagmus        Chest:  normal symmetry        Cardiac: regular rhythm occasional premature beats                Extremities and Back:  no deformities, clubbing, cyanosis, erythema observed        Neurological:  no gross motor deficits;affect appropriate                 Medications:     Current Outpatient Medications   Medication Sig Dispense Refill    acetaminophen (TYLENOL) 500 MG tablet Take 1-2 tablets (500-1,000 mg) by mouth every 6 hours as needed for mild pain      Calcium Carbonate-Vit D-Min (CALCIUM 1200 PO)       Cholecalciferol (VITAMIN D PO) Take  by mouth.      dutasteride (AVODART) 0.5 MG capsule Take 1 capsule (0.5 mg) by mouth daily 90 capsule 3    fish oil-omega-3 fatty acids 1000 MG capsule Take 2 g by mouth daily.      FLAXSEED, LINSEED, PO       furosemide (LASIX) 20 MG tablet Take 1 tablet (20 mg) by mouth daily 90 tablet 3    GLUCOSAMINE PO Take by mouth 2 times daily       guaiFENesin-codeine (ROBITUSSIN AC) 100-10 MG/5ML solution Take 5-10 mLs by mouth nightly as needed for cough 118 mL 0    hydrocortisone (CORTAID) 1 % cream Apply sparingly to affected area twice daily as needed. 30 g 1    hydroxychloroquine (PLAQUENIL) 200 MG tablet Take 400 mg by mouth daily 180 tablet 3    predniSONE (DELTASONE) 2.5 MG tablet Take 2.5 mg by mouth daily      tamsulosin (FLOMAX) 0.4 MG capsule Take 1 capsule (0.4 mg) by mouth every evening 90 capsule 3    betamethasone dipropionate (DIPROSONE) 0.05 % external cream Apply topically 2 times daily (Patient not taking: Reported on 1/16/2024)      furosemide (LASIX) 20 MG tablet Take 1 tablet (20 mg) by mouth daily (Patient not taking: Reported on  6/28/2023) 45 tablet 1                Data:   All laboratory data reviewed  No results found for this or any previous visit (from the past 24 hour(s)).    All laboratory data reviewed  Lab Results   Component Value Date    CHOL 141 05/19/2021     Lab Results   Component Value Date    HDL 51 05/19/2021     Lab Results   Component Value Date    LDL 79 05/19/2021     Lab Results   Component Value Date    TRIG 56 05/19/2021     Lab Results   Component Value Date    CHOLHDLRATIO 3.0 05/30/2013     TSH   Date Value Ref Range Status   06/28/2023 1.72 0.30 - 4.20 uIU/mL Final   03/30/2022 1.24 0.40 - 4.00 mU/L Final   05/19/2021 2.35 0.40 - 4.00 mU/L Final     Last Basic Metabolic Panel:  Lab Results   Component Value Date     06/28/2023     05/19/2021      Lab Results   Component Value Date    POTASSIUM 4.3 06/28/2023    POTASSIUM 4.3 03/30/2022    POTASSIUM 4.0 05/19/2021     Lab Results   Component Value Date    CHLORIDE 99 06/28/2023    CHLORIDE 106 03/30/2022    CHLORIDE 106 05/19/2021     Lab Results   Component Value Date    MARILYN 9.4 06/28/2023    MARILYN 8.8 05/19/2021     Lab Results   Component Value Date    CO2 28 06/28/2023    CO2 30 03/30/2022    CO2 33 05/19/2021     Lab Results   Component Value Date    BUN 17.0 06/28/2023    BUN 17 03/30/2022    BUN 16 05/19/2021     Lab Results   Component Value Date    CR 0.94 06/28/2023    CR 1.03 05/19/2021     Lab Results   Component Value Date    GLC 93 06/28/2023     03/30/2022    GLC 88 05/19/2021     Lab Results   Component Value Date    WBC 5.7 06/28/2023    WBC Canceled, Test credited 05/19/2021    WBC 3.1 05/19/2021     Lab Results   Component Value Date    RBC 3.67 06/28/2023    RBC Canceled, Test credited 05/19/2021    RBC 3.76 05/19/2021     Lab Results   Component Value Date    HGB 12.8 06/28/2023    HGB Canceled, Test credited 05/19/2021    HGB 13.5 05/19/2021     Lab Results   Component Value Date    HCT 37.5 06/28/2023    HCT Canceled, Test  credited 05/19/2021    HCT 39.8 05/19/2021     Lab Results   Component Value Date     06/28/2023    MCV Canceled, Test credited 05/19/2021     05/19/2021     Lab Results   Component Value Date    MCH 34.9 06/28/2023    MCH Canceled, Test credited 05/19/2021    MCH 35.9 05/19/2021     Lab Results   Component Value Date    MCHC 34.1 06/28/2023    MCHC Canceled, Test credited 05/19/2021    MCHC 33.9 05/19/2021     Lab Results   Component Value Date    RDW 12.2 06/28/2023    RDW Canceled, Test credited 05/19/2021    RDW 12.8 05/19/2021     Lab Results   Component Value Date     06/28/2023    PLT Canceled, Test credited 05/19/2021     05/19/2021

## 2024-01-16 NOTE — LETTER
1/16/2024    Srinivasa Tello MD, MD  [address] [facility] at   [facility] [address]    RE: Jorge Salomon       Dear Colleague,     I had the pleasure of seeing Jorge Salomon in the [facility].  Cardiology Progress Note          Assessment and Plan:       Coronary calcification and dyspnea on exertion  Plan stress echocardiogram.  This will also help us evaluate if he has progressive block or symptomatic bradycardia on ECG tracings.  Follow-up in 1 month with me per patient request to review results.      40 minutes was spent with the patient, precharting and reviewing tests as well as post visit charting all done today..    This note was transcribed using electronic voice recognition software and there may be typographical errors present.                    Interval History:     The patient is a very pleasant 89 year old retired  who is very mentally sharp.  We discussed a number of concerns he had regarding his past diagnoses and echocardiogram.  He feels like his dyspnea may have improved a little bit when he was taking the furosemide 20 mg daily.    Has had slight worsening of dyspnea on exertion over the years but nothing abrupt.  He still works out at the [facility] lifting weights and on treadmill.    He has coronary calcifications on recent CT.  He has 4.4 cm ascending aorta that has been relatively stable.  He denies any chest discomfort.    ECG in the office has sinus rhythm with possible blocked PACs.  He does not feel these.  I can auscultate them on physical exam.  He denies any presyncope or syncope.                     Review of Systems:     Review of Systems:  Skin:        Eyes:       ENT:       Respiratory:  Positive for dyspnea on exertion;sleep apnea;CPAP  Cardiovascular:  Negative    Gastroenterology:      Genitourinary:       Musculoskeletal:       Neurologic:       Psychiatric:       Heme/Lymph/Imm:       Endocrine:                   Physical Exam:     Vitals: BP  "138/72 (BP Location: Right arm, Patient Position: Sitting, Cuff Size: Adult Regular)   Pulse 81   Ht 1.867 m (6' 1.5\")   Wt 92.5 kg (204 lb)   SpO2 97%   BMI 26.55 kg/m    Constitutional:  cooperative, alert and oriented, well developed, well nourished, in no acute distress        Skin:  warm and dry to the touch, no apparent skin lesions or masses noted        Head:  normocephalic, no masses or lesions        Eyes:  pupils equal and round, conjunctivae and lids unremarkable, sclera white, no xanthalasma, EOMS intact, no nystagmus        Chest:  normal symmetry        Cardiac: regular rhythm occasional premature beats                Extremities and Back:  no deformities, clubbing, cyanosis, erythema observed        Neurological:  no gross motor deficits;affect appropriate                 Medications:     Current Outpatient Medications   Medication Sig Dispense Refill    acetaminophen (TYLENOL) 500 MG tablet Take 1-2 tablets (500-1,000 mg) by mouth every 6 hours as needed for mild pain      Calcium Carbonate-Vit D-Min (CALCIUM 1200 PO)       Cholecalciferol (VITAMIN D PO) Take  by mouth.      dutasteride (AVODART) 0.5 MG capsule Take 1 capsule (0.5 mg) by mouth daily 90 capsule 3    fish oil-omega-3 fatty acids 1000 MG capsule Take 2 g by mouth daily.      FLAXSEED, LINSEED, PO       furosemide (LASIX) 20 MG tablet Take 1 tablet (20 mg) by mouth daily 90 tablet 3    GLUCOSAMINE PO Take by mouth 2 times daily       guaiFENesin-codeine (ROBITUSSIN AC) 100-10 MG/5ML solution Take 5-10 mLs by mouth nightly as needed for cough 118 mL 0    hydrocortisone (CORTAID) 1 % cream Apply sparingly to affected area twice daily as needed. 30 g 1    hydroxychloroquine (PLAQUENIL) 200 MG tablet Take 400 mg by mouth daily 180 tablet 3    predniSONE (DELTASONE) 2.5 MG tablet Take 2.5 mg by mouth daily      tamsulosin (FLOMAX) 0.4 MG capsule Take 1 capsule (0.4 mg) by mouth every evening 90 capsule 3    betamethasone dipropionate " (DIPROSONE) 0.05 % external cream Apply topically 2 times daily (Patient not taking: Reported on 1/16/2024)      furosemide (LASIX) 20 MG tablet Take 1 tablet (20 mg) by mouth daily (Patient not taking: Reported on 6/28/2023) 45 tablet 1                Data:   All laboratory data reviewed  No results found for this or any previous visit (from the past 24 hour(s)).    All laboratory data reviewed  Lab Results   Component Value Date    CHOL 141 05/19/2021     Lab Results   Component Value Date    HDL 51 05/19/2021     Lab Results   Component Value Date    LDL 79 05/19/2021     Lab Results   Component Value Date    TRIG 56 05/19/2021     Lab Results   Component Value Date    CHOLHDLRATIO 3.0 05/30/2013     TSH   Date Value Ref Range Status   06/28/2023 1.72 0.30 - 4.20 uIU/mL Final   03/30/2022 1.24 0.40 - 4.00 mU/L Final   05/19/2021 2.35 0.40 - 4.00 mU/L Final     Last Basic Metabolic Panel:  Lab Results   Component Value Date     06/28/2023     05/19/2021      Lab Results   Component Value Date    POTASSIUM 4.3 06/28/2023    POTASSIUM 4.3 03/30/2022    POTASSIUM 4.0 05/19/2021     Lab Results   Component Value Date    CHLORIDE 99 06/28/2023    CHLORIDE 106 03/30/2022    CHLORIDE 106 05/19/2021     Lab Results   Component Value Date    MARILYN 9.4 06/28/2023    MARILYN 8.8 05/19/2021     Lab Results   Component Value Date    CO2 28 06/28/2023    CO2 30 03/30/2022    CO2 33 05/19/2021     Lab Results   Component Value Date    BUN 17.0 06/28/2023    BUN 17 03/30/2022    BUN 16 05/19/2021     Lab Results   Component Value Date    CR 0.94 06/28/2023    CR 1.03 05/19/2021     Lab Results   Component Value Date    GLC 93 06/28/2023     03/30/2022    GLC 88 05/19/2021     Lab Results   Component Value Date    WBC 5.7 06/28/2023    WBC Canceled, Test credited 05/19/2021    WBC 3.1 05/19/2021     Lab Results   Component Value Date    RBC 3.67 06/28/2023    RBC Canceled, Test credited 05/19/2021    RBC 3.76  05/19/2021     Lab Results   Component Value Date    HGB 12.8 06/28/2023    HGB Canceled, Test credited 05/19/2021    HGB 13.5 05/19/2021     Lab Results   Component Value Date    HCT 37.5 06/28/2023    HCT Canceled, Test credited 05/19/2021    HCT 39.8 05/19/2021     Lab Results   Component Value Date     06/28/2023    MCV Canceled, Test credited 05/19/2021     05/19/2021     Lab Results   Component Value Date    MCH 34.9 06/28/2023    MCH Canceled, Test credited 05/19/2021    MCH 35.9 05/19/2021     Lab Results   Component Value Date    MCHC 34.1 06/28/2023    MCHC Canceled, Test credited 05/19/2021    MCHC 33.9 05/19/2021     Lab Results   Component Value Date    RDW 12.2 06/28/2023    RDW Canceled, Test credited 05/19/2021    RDW 12.8 05/19/2021     Lab Results   Component Value Date     06/28/2023    PLT Canceled, Test credited 05/19/2021     05/19/2021               Thank you for allowing me to participate in the care of your patient.      Sincerely,     Hair Littlejohn MD     [facility]  cc:   Srinivasa Tello MD  [address] [place] [address]

## 2024-01-23 ENCOUNTER — TRANSFERRED RECORDS (OUTPATIENT)
Dept: HEALTH INFORMATION MANAGEMENT | Facility: CLINIC | Age: 89
End: 2024-01-23
Payer: COMMERCIAL

## 2024-01-30 ENCOUNTER — HOSPITAL ENCOUNTER (OUTPATIENT)
Dept: CARDIOLOGY | Facility: CLINIC | Age: 89
Discharge: HOME | End: 2024-01-30
Attending: INTERNAL MEDICINE | Admitting: INTERNAL MEDICINE
Payer: COMMERCIAL

## 2024-01-30 DIAGNOSIS — I50.32 CHRONIC DIASTOLIC HEART FAILURE (H): ICD-10-CM

## 2024-01-30 DIAGNOSIS — I25.10 CORONARY ARTERY CALCIFICATION SEEN ON CAT SCAN: ICD-10-CM

## 2024-01-30 DIAGNOSIS — R06.09 DOE (DYSPNEA ON EXERTION): ICD-10-CM

## 2024-01-30 PROCEDURE — 93325 DOPPLER ECHO COLOR FLOW MAPG: CPT | Mod: 26 | Performed by: INTERNAL MEDICINE

## 2024-01-30 PROCEDURE — 93016 CV STRESS TEST SUPVJ ONLY: CPT | Performed by: INTERNAL MEDICINE

## 2024-01-30 PROCEDURE — 93325 DOPPLER ECHO COLOR FLOW MAPG: CPT | Mod: TC

## 2024-01-30 PROCEDURE — 93018 CV STRESS TEST I&R ONLY: CPT | Performed by: INTERNAL MEDICINE

## 2024-01-30 PROCEDURE — 93350 STRESS TTE ONLY: CPT | Mod: 26 | Performed by: INTERNAL MEDICINE

## 2024-01-30 PROCEDURE — 255N000002 HC RX 255 OP 636: Performed by: INTERNAL MEDICINE

## 2024-01-30 PROCEDURE — 93321 DOPPLER ECHO F-UP/LMTD STD: CPT | Mod: 26 | Performed by: INTERNAL MEDICINE

## 2024-01-30 RX ADMIN — HUMAN ALBUMIN MICROSPHERES AND PERFLUTREN 3 ML: 10; .22 INJECTION, SOLUTION INTRAVENOUS at 14:58

## 2024-02-13 ENCOUNTER — OFFICE VISIT (OUTPATIENT)
Dept: CARDIOLOGY | Facility: CLINIC | Age: 89
End: 2024-02-13
Attending: INTERNAL MEDICINE
Payer: COMMERCIAL

## 2024-02-13 VITALS
WEIGHT: 202.9 LBS | DIASTOLIC BLOOD PRESSURE: 68 MMHG | HEART RATE: 84 BPM | BODY MASS INDEX: 26.04 KG/M2 | HEIGHT: 74 IN | SYSTOLIC BLOOD PRESSURE: 128 MMHG

## 2024-02-13 DIAGNOSIS — I25.10 CORONARY ARTERY CALCIFICATION SEEN ON CAT SCAN: ICD-10-CM

## 2024-02-13 DIAGNOSIS — I50.32 CHRONIC DIASTOLIC HEART FAILURE (H): ICD-10-CM

## 2024-02-13 DIAGNOSIS — R06.09 DOE (DYSPNEA ON EXERTION): ICD-10-CM

## 2024-02-13 PROCEDURE — 99214 OFFICE O/P EST MOD 30 MIN: CPT | Performed by: INTERNAL MEDICINE

## 2024-02-13 RX ORDER — PRAVASTATIN SODIUM 20 MG
20 TABLET ORAL DAILY
Qty: 90 TABLET | Refills: 3 | Status: SHIPPED | OUTPATIENT
Start: 2024-02-13 | End: 2024-02-13

## 2024-02-13 RX ORDER — PRAVASTATIN SODIUM 20 MG
20 TABLET ORAL DAILY
Qty: 90 TABLET | Refills: 3 | Status: SHIPPED | OUTPATIENT
Start: 2024-02-13 | End: 2024-07-12

## 2024-02-13 NOTE — PROGRESS NOTES
"Cardiology Progress Note          Assessment and Plan:       Stable dyspnea on exertion, coronary calcification but negative stress echocardiogram  Continue small dose of furosemide for diastolic congestion  Continue limiting salt  Trial of adding small dose of pravastatin, did not tolerate simvastatin in the past due to myalgias.    Routine follow-up in 1 year        30 minutes was spent with the patient, precharting and reviewing tests as well as post visit charting all done today..    This note was transcribed using electronic voice recognition software and there may be typographical errors present.                    Interval History:     The patient is a very pleasant 89 year old inquisitive retired  with coronary calcification but negative stress echocardiogram.  We discussed palpitations, caffeine, his interstitial lung disease and statins.  He previously had myalgias on simvastatin.  He is willing to try pravastatin.  We also discussed the mechanism of diastolic dysfunction and the rationale for furosemide for him.                     Review of Systems:     Review of Systems:  Skin:  Positive for bruising   Eyes:  Positive for glasses  ENT:  Negative    Respiratory:  Positive for dyspnea on exertion;sleep apnea;CPAP  Cardiovascular:  Negative    Gastroenterology: Negative    Genitourinary:  Negative    Musculoskeletal:  Positive for arthritis  Neurologic:  Positive for numbness or tingling of hands  Psychiatric:  Negative    Heme/Lymph/Imm:  Negative    Endocrine:  Negative                Physical Exam:     Vitals: /68   Pulse 84   Ht 1.867 m (6' 1.5\")   Wt 92 kg (202 lb 14.4 oz)   BMI 26.41 kg/m    Constitutional:  cooperative, alert and oriented, well developed, well nourished, in no acute distress        Skin:  warm and dry to the touch, no apparent skin lesions or masses noted        Head:  normocephalic, no masses or lesions        Eyes:  pupils equal and round, conjunctivae and lids " unremarkable, sclera white, no xanthalasma, EOMS intact, no nystagmus        Chest:  normal symmetry        Cardiac: regular rhythm                  Extremities and Back:  no deformities, clubbing, cyanosis, erythema observed        Neurological:  no gross motor deficits;affect appropriate                 Medications:     Current Outpatient Medications   Medication Sig Dispense Refill    acetaminophen (TYLENOL) 500 MG tablet Take 1-2 tablets (500-1,000 mg) by mouth every 6 hours as needed for mild pain      Cholecalciferol (VITAMIN D PO) Take  by mouth.      dutasteride (AVODART) 0.5 MG capsule Take 1 capsule (0.5 mg) by mouth daily 90 capsule 3    fish oil-omega-3 fatty acids 1000 MG capsule Take 2 g by mouth daily.      FLAXSEED, LINSEED, PO       furosemide (LASIX) 20 MG tablet Take 1 tablet (20 mg) by mouth daily 90 tablet 3    GLUCOSAMINE PO Take by mouth 2 times daily       guaiFENesin-codeine (ROBITUSSIN AC) 100-10 MG/5ML solution Take 5-10 mLs by mouth nightly as needed for cough 118 mL 0    hydrocortisone (CORTAID) 1 % cream Apply sparingly to affected area twice daily as needed. 30 g 1    hydroxychloroquine (PLAQUENIL) 200 MG tablet Take 400 mg by mouth daily 180 tablet 3    pravastatin (PRAVACHOL) 20 MG tablet Take 1 tablet (20 mg) by mouth daily 90 tablet 3    predniSONE (DELTASONE) 2.5 MG tablet Take 2.5 mg by mouth daily      tamsulosin (FLOMAX) 0.4 MG capsule Take 1 capsule (0.4 mg) by mouth every evening 90 capsule 3    betamethasone dipropionate (DIPROSONE) 0.05 % external cream Apply topically 2 times daily (Patient not taking: Reported on 1/16/2024)      Calcium Carbonate-Vit D-Min (CALCIUM 1200 PO)  (Patient not taking: Reported on 2/13/2024)                  Data:   All laboratory data reviewed  No results found for this or any previous visit (from the past 24 hour(s)).    All laboratory data reviewed  Lab Results   Component Value Date    CHOL 141 05/19/2021     Lab Results   Component Value  Date    HDL 51 05/19/2021     Lab Results   Component Value Date    LDL 79 05/19/2021     Lab Results   Component Value Date    TRIG 56 05/19/2021     Lab Results   Component Value Date    CHOLHDLRATIO 3.0 05/30/2013     TSH   Date Value Ref Range Status   06/28/2023 1.72 0.30 - 4.20 uIU/mL Final   03/30/2022 1.24 0.40 - 4.00 mU/L Final   05/19/2021 2.35 0.40 - 4.00 mU/L Final     Last Basic Metabolic Panel:  Lab Results   Component Value Date     06/28/2023     05/19/2021      Lab Results   Component Value Date    POTASSIUM 4.3 06/28/2023    POTASSIUM 4.3 03/30/2022    POTASSIUM 4.0 05/19/2021     Lab Results   Component Value Date    CHLORIDE 99 06/28/2023    CHLORIDE 106 03/30/2022    CHLORIDE 106 05/19/2021     Lab Results   Component Value Date    MARILYN 9.4 06/28/2023    MARILYN 8.8 05/19/2021     Lab Results   Component Value Date    CO2 28 06/28/2023    CO2 30 03/30/2022    CO2 33 05/19/2021     Lab Results   Component Value Date    BUN 17.0 06/28/2023    BUN 17 03/30/2022    BUN 16 05/19/2021     Lab Results   Component Value Date    CR 0.94 06/28/2023    CR 1.03 05/19/2021     Lab Results   Component Value Date    GLC 93 06/28/2023     03/30/2022    GLC 88 05/19/2021     Lab Results   Component Value Date    WBC 5.7 06/28/2023    WBC Canceled, Test credited 05/19/2021    WBC 3.1 05/19/2021     Lab Results   Component Value Date    RBC 3.67 06/28/2023    RBC Canceled, Test credited 05/19/2021    RBC 3.76 05/19/2021     Lab Results   Component Value Date    HGB 12.8 06/28/2023    HGB Canceled, Test credited 05/19/2021    HGB 13.5 05/19/2021     Lab Results   Component Value Date    HCT 37.5 06/28/2023    HCT Canceled, Test credited 05/19/2021    HCT 39.8 05/19/2021     Lab Results   Component Value Date     06/28/2023    MCV Canceled, Test credited 05/19/2021     05/19/2021     Lab Results   Component Value Date    MCH 34.9 06/28/2023    MCH Canceled, Test credited 05/19/2021    MCH  35.9 05/19/2021     Lab Results   Component Value Date    MCHC 34.1 06/28/2023    MCHC Canceled, Test credited 05/19/2021    MCHC 33.9 05/19/2021     Lab Results   Component Value Date    RDW 12.2 06/28/2023    RDW Canceled, Test credited 05/19/2021    RDW 12.8 05/19/2021     Lab Results   Component Value Date     06/28/2023    PLT Canceled, Test credited 05/19/2021     05/19/2021

## 2024-02-13 NOTE — LETTER
"2/13/2024    Srinivasa Tello MD, MD  [address] [facility] [address]  [facility] [address]    RE: Jorge Segalfarhat       Dear Colleague,     I had the pleasure of seeing Jorge BACA Umm in the [facility].  Cardiology Progress Note          Assessment and Plan:       Stable dyspnea on exertion, coronary calcification but negative stress echocardiogram  Continue small dose of furosemide for diastolic congestion  Continue limiting salt  Trial of adding small dose of pravastatin, did not tolerate simvastatin in the past due to myalgias.    Routine follow-up in 1 year        30 minutes was spent with the patient, precharting and reviewing tests as well as post visit charting all done today..    This note was transcribed using electronic voice recognition software and there may be typographical errors present.                    Interval History:     The patient is a very pleasant 89 year old inquisitive retired  with coronary calcification but negative stress echocardiogram.  We discussed palpitations, caffeine, his interstitial lung disease and statins.  He previously had myalgias on simvastatin.  He is willing to try pravastatin.  We also discussed the mechanism of diastolic dysfunction and the rationale for furosemide for him.                     Review of Systems:     Review of Systems:  Skin:  Positive for bruising   Eyes:  Positive for glasses  ENT:  Negative    Respiratory:  Positive for dyspnea on exertion;sleep apnea;CPAP  Cardiovascular:  Negative    Gastroenterology: Negative    Genitourinary:  Negative    Musculoskeletal:  Positive for arthritis  Neurologic:  Positive for numbness or tingling of hands  Psychiatric:  Negative    Heme/Lymph/Imm:  Negative    Endocrine:  Negative                Physical Exam:     Vitals: /68   Pulse 84   Ht 1.867 m (6' 1.5\")   Wt 92 kg (202 lb 14.4 oz)   BMI 26.41 kg/m    Constitutional:  cooperative, alert and oriented, well developed, well " nourished, in no acute distress        Skin:  warm and dry to the touch, no apparent skin lesions or masses noted        Head:  normocephalic, no masses or lesions        Eyes:  pupils equal and round, conjunctivae and lids unremarkable, sclera white, no xanthalasma, EOMS intact, no nystagmus        Chest:  normal symmetry        Cardiac: regular rhythm                  Extremities and Back:  no deformities, clubbing, cyanosis, erythema observed        Neurological:  no gross motor deficits;affect appropriate                 Medications:     Current Outpatient Medications   Medication Sig Dispense Refill    acetaminophen (TYLENOL) 500 MG tablet Take 1-2 tablets (500-1,000 mg) by mouth every 6 hours as needed for mild pain      Cholecalciferol (VITAMIN D PO) Take  by mouth.      dutasteride (AVODART) 0.5 MG capsule Take 1 capsule (0.5 mg) by mouth daily 90 capsule 3    fish oil-omega-3 fatty acids 1000 MG capsule Take 2 g by mouth daily.      FLAXSEED, LINSEED, PO       furosemide (LASIX) 20 MG tablet Take 1 tablet (20 mg) by mouth daily 90 tablet 3    GLUCOSAMINE PO Take by mouth 2 times daily       guaiFENesin-codeine (ROBITUSSIN AC) 100-10 MG/5ML solution Take 5-10 mLs by mouth nightly as needed for cough 118 mL 0    hydrocortisone (CORTAID) 1 % cream Apply sparingly to affected area twice daily as needed. 30 g 1    hydroxychloroquine (PLAQUENIL) 200 MG tablet Take 400 mg by mouth daily 180 tablet 3    pravastatin (PRAVACHOL) 20 MG tablet Take 1 tablet (20 mg) by mouth daily 90 tablet 3    predniSONE (DELTASONE) 2.5 MG tablet Take 2.5 mg by mouth daily      tamsulosin (FLOMAX) 0.4 MG capsule Take 1 capsule (0.4 mg) by mouth every evening 90 capsule 3    betamethasone dipropionate (DIPROSONE) 0.05 % external cream Apply topically 2 times daily (Patient not taking: Reported on 1/16/2024)      Calcium Carbonate-Vit D-Min (CALCIUM 1200 PO)  (Patient not taking: Reported on 2/13/2024)                  Data:   All  laboratory data reviewed  No results found for this or any previous visit (from the past 24 hour(s)).    All laboratory data reviewed  Lab Results   Component Value Date    CHOL 141 05/19/2021     Lab Results   Component Value Date    HDL 51 05/19/2021     Lab Results   Component Value Date    LDL 79 05/19/2021     Lab Results   Component Value Date    TRIG 56 05/19/2021     Lab Results   Component Value Date    CHOLHDLRATIO 3.0 05/30/2013     TSH   Date Value Ref Range Status   06/28/2023 1.72 0.30 - 4.20 uIU/mL Final   03/30/2022 1.24 0.40 - 4.00 mU/L Final   05/19/2021 2.35 0.40 - 4.00 mU/L Final     Last Basic Metabolic Panel:  Lab Results   Component Value Date     06/28/2023     05/19/2021      Lab Results   Component Value Date    POTASSIUM 4.3 06/28/2023    POTASSIUM 4.3 03/30/2022    POTASSIUM 4.0 05/19/2021     Lab Results   Component Value Date    CHLORIDE 99 06/28/2023    CHLORIDE 106 03/30/2022    CHLORIDE 106 05/19/2021     Lab Results   Component Value Date    MARILYN 9.4 06/28/2023    MARILYN 8.8 05/19/2021     Lab Results   Component Value Date    CO2 28 06/28/2023    CO2 30 03/30/2022    CO2 33 05/19/2021     Lab Results   Component Value Date    BUN 17.0 06/28/2023    BUN 17 03/30/2022    BUN 16 05/19/2021     Lab Results   Component Value Date    CR 0.94 06/28/2023    CR 1.03 05/19/2021     Lab Results   Component Value Date    GLC 93 06/28/2023     03/30/2022    GLC 88 05/19/2021     Lab Results   Component Value Date    WBC 5.7 06/28/2023    WBC Canceled, Test credited 05/19/2021    WBC 3.1 05/19/2021     Lab Results   Component Value Date    RBC 3.67 06/28/2023    RBC Canceled, Test credited 05/19/2021    RBC 3.76 05/19/2021     Lab Results   Component Value Date    HGB 12.8 06/28/2023    HGB Canceled, Test credited 05/19/2021    HGB 13.5 05/19/2021     Lab Results   Component Value Date    HCT 37.5 06/28/2023    HCT Canceled, Test credited 05/19/2021    HCT 39.8 05/19/2021     Lab  Results   Component Value Date     06/28/2023    MCV Canceled, Test credited 05/19/2021     05/19/2021     Lab Results   Component Value Date    MCH 34.9 06/28/2023    MCH Canceled, Test credited 05/19/2021    MCH 35.9 05/19/2021     Lab Results   Component Value Date    MCHC 34.1 06/28/2023    MCHC Canceled, Test credited 05/19/2021    MCHC 33.9 05/19/2021     Lab Results   Component Value Date    RDW 12.2 06/28/2023    RDW Canceled, Test credited 05/19/2021    RDW 12.8 05/19/2021     Lab Results   Component Value Date     06/28/2023    PLT Canceled, Test credited 05/19/2021     05/19/2021               Thank you for allowing me to participate in the care of your patient.      Sincerely,     Hair Littlejohn MD     [facility]  cc:   Hair Littlejohn MD  [address] [facility] [address]  [place]  [place] [address]

## 2024-04-15 ENCOUNTER — NURSE TRIAGE (OUTPATIENT)
Dept: INTERNAL MEDICINE | Facility: CLINIC | Age: 89
End: 2024-04-15
Payer: COMMERCIAL

## 2024-04-15 NOTE — TELEPHONE ENCOUNTER
Patient calling with concern for infected shingles rash in groin.  Patient would like to be seen ASAP.  Sent to urgent care to review symptoms.  Marylin CAUSEY RN   Reason for Disposition   Patient sounds very sick or weak to the triager    Additional Information   Negative: Difficult to awaken or acting confused (e.g., disoriented, slurred speech)   Negative: Sounds like a life-threatening emergency to the triager   Negative: Localized rash and doesn't match the SYMPTOMS of shingles   Negative: Back pain and doesn't match the SYMPTOMS of shingles   Negative: Shingles Vaccine (Recombinant Zoster Vaccine; RZV; Shingrix), questions about   Negative: Shingles rash of face and eye pain or blurred vision   Negative: Shingles rash on the eyelid or tip of the nose   Negative: Shingles rash and spots start appearing other places on body    Protocols used: Shingles (Zoster)-A-OH

## 2024-04-23 ENCOUNTER — ALLIED HEALTH/NURSE VISIT (OUTPATIENT)
Dept: UROLOGY | Facility: CLINIC | Age: 89
End: 2024-04-23
Payer: COMMERCIAL

## 2024-04-23 ENCOUNTER — TELEPHONE (OUTPATIENT)
Facility: CLINIC | Age: 89
End: 2024-04-23

## 2024-04-23 DIAGNOSIS — R35.0 URINARY FREQUENCY: Primary | ICD-10-CM

## 2024-04-23 LAB
ALBUMIN UR-MCNC: NEGATIVE MG/DL
APPEARANCE UR: CLEAR
BILIRUB UR QL STRIP: NEGATIVE
COLOR UR AUTO: YELLOW
GLUCOSE UR STRIP-MCNC: NEGATIVE MG/DL
HGB UR QL STRIP: NEGATIVE
KETONES UR STRIP-MCNC: NEGATIVE MG/DL
LEUKOCYTE ESTERASE UR QL STRIP: NEGATIVE
NITRATE UR QL: NEGATIVE
PH UR STRIP: 7 [PH] (ref 5–7)
RESIDUAL VOLUME (RV) (EXTERNAL): 378
SP GR UR STRIP: 1.01 (ref 1–1.03)
UROBILINOGEN UR STRIP-ACNC: 0.2 E.U./DL

## 2024-04-23 PROCEDURE — 81003 URINALYSIS AUTO W/O SCOPE: CPT | Mod: QW

## 2024-04-23 PROCEDURE — 51798 US URINE CAPACITY MEASURE: CPT

## 2024-04-23 NOTE — TELEPHONE ENCOUNTER
[facility] Call Center    Phone Message    May a detailed message be left on voicemail: yes     Reason for Call: Other: Pt called in because he has been having a hard time emptying his bladder since yesterday. Pt is requesting a call back as he believes he has an UTI.      Action Taken: Message routed to:  Other: Urology    Travel Screening: Not Applicable

## 2024-04-23 NOTE — PROGRESS NOTES
Jorge Salomon comes into clinic today at the request of Dr. Plunkett Ordering Provider for UA/PVR.    Pt presents to clinic today for UA/PVR due to increase in urinary frequency.   Pt's urine today did not look suspicious for infection.  Pt's post void residual today was 378 mL by bladder scan.  Pt states he stopped his lasix a couple days prior which helped with frequency, pt is concerned his recent shingles are contributing to changes in urination.   Per [place], clancy catheter placement is not necessary at this time. Pt is to increase ambulation and practice double voiding.   Pt was instructed to avoid bladder irritants and call or return to clinic for pain or if unable to urinate. Pt expressed understanding. Pt was instructed to go to ER if problems arise after hours.   Pt is to keep follow up with Dr. Plunkett in June.      This service provided today was under the supervising provider of the deborah Stoll CNP, who was available if needed.    Jackelyn Mejia, CMA

## 2024-04-23 NOTE — TELEPHONE ENCOUNTER
Returned phone call to patient and he is having a hard time starting urination since yesterday and he voids frequently. He reports that he only voids small amounts. Patient was advised to come into BV clinic this afternoon for a PVR and urine sample. He is concerned for UTI. Patient confirmed time in BV and called BV office to set appt.     Carol Aiken LPN

## 2024-04-26 ENCOUNTER — OFFICE VISIT (OUTPATIENT)
Dept: UROLOGY | Facility: CLINIC | Age: 89
End: 2024-04-26
Payer: COMMERCIAL

## 2024-04-26 DIAGNOSIS — N40.1 BENIGN PROSTATIC HYPERPLASIA WITH WEAK URINARY STREAM: Primary | ICD-10-CM

## 2024-04-26 DIAGNOSIS — R39.89 SUSPECTED UTI: ICD-10-CM

## 2024-04-26 DIAGNOSIS — R39.12 BENIGN PROSTATIC HYPERPLASIA WITH WEAK URINARY STREAM: Primary | ICD-10-CM

## 2024-04-26 DIAGNOSIS — C61 PROSTATE CANCER (H): ICD-10-CM

## 2024-04-26 LAB — RESIDUAL VOLUME (RV) (EXTERNAL): 411

## 2024-04-26 PROCEDURE — 99213 OFFICE O/P EST LOW 20 MIN: CPT | Performed by: STUDENT IN AN ORGANIZED HEALTH CARE EDUCATION/TRAINING PROGRAM

## 2024-04-26 PROCEDURE — 51798 US URINE CAPACITY MEASURE: CPT | Performed by: STUDENT IN AN ORGANIZED HEALTH CARE EDUCATION/TRAINING PROGRAM

## 2024-04-26 RX ORDER — DUTASTERIDE 0.5 MG/1
1 CAPSULE, LIQUID FILLED ORAL DAILY
Qty: 90 CAPSULE | Refills: 3 | Status: SHIPPED | OUTPATIENT
Start: 2024-04-26 | End: 2024-07-01

## 2024-04-26 RX ORDER — TAMSULOSIN HYDROCHLORIDE 0.4 MG/1
0.8 CAPSULE ORAL EVERY EVENING
Qty: 180 CAPSULE | Refills: 3 | Status: SHIPPED | OUTPATIENT
Start: 2024-04-26

## 2024-04-26 NOTE — PROGRESS NOTES
CHIEF COMPLAINT   Jorge Salomon who is a 89 year old male returns today for follow-up of Ricardo 3+3 = 6 prostate cancer s/p radiation 2017, BPH with LUTS on avodart and flomax.      HPI   Jorge Salomon is a 89 year old male returns today for follow-up of Ruby 3+3 = 6 prostate cancer s/p radiation 2017, BPH with LUTS on avodart and flomax.      Patient is recovering from shingles on the right upper thigh    He is taking tamuslosin 0.4 mg daily and avodart daily    Today patient has only been able to empty a few ml's and feels uncomfortable but doesn't like idea of catheter    Patient is a 89 year old  male   Vitals: There were no vitals taken for this visit.  There is no height or weight on file to calculate BMI.  General Appearance Adult:   Alert, no acute distress, oriented  HENT: throat/mouth:normal, good dentition  Lungs: no respiratory distress, or pursed lip breathing  Heart: No obvious jugular venous distension present  Abdomen: nondistended  Musculoskeltal: extremities normal, no peripheral edema  Skin: no suspicious lesions or rashes  Neuro: Alert, oriented, speech and mentation normal  Psych: affect and mood normal  Gait: Normal  : deferred    Component      Latest Ref Rng 4/23/2024  2:57 PM   Color Urine      Colorless, Straw, Light Yellow, Yellow  Yellow    Appearance Urine      Clear  Clear    Glucose Urine      Negative mg/dL Negative    Bilirubin Urine      Negative  Negative    Ketones Urine      Negative mg/dL Negative    Specific Gravity Urine      1.003 - 1.035  1.015    Blood Urine      Negative  Negative    pH Urine      5.0 - 7.0  7.0    Protein Albumin Urine      Negative mg/dL Negative    Urobilinogen Urine      0.2, 1.0 E.U./dL 0.2    Nitrite Urine      Negative  Negative    Leukocyte Esterase Urine      Negative  Negative          PVR 411ml today (378 ml a few days ago)    ASSESSMENT and PLAN  89 year old male returns today for follow-up of Ricardo 3+3 = 6 prostate cancer s/p  radiation 2017, BPH with LUTS on avodart and flomax.      Worsening urinary symptoms with incomplete bladder emptying, will try to increase alpha blocker. Possible he may be developing radiation-related stricture (but no blood on UA). He says he is recovering from shingles in his lower extremity, not sure if this is contributing    - increase tamsulosin to 0.8 mg daily (rx drug management)  - intermittent catheter teaching, four times a day  - follow up in June as scheduled with PSA prior, hopefully will eventually be able to urinate spontaneously on increased tamsulosin dose (as well as recovery from shingles)    Geremias Plunkett MD   [facility]  [facility] Phone: [phone]      *addendum 5/3/2024* suspected UTI, will prescribe macrobid while culture results

## 2024-04-26 NOTE — NURSING NOTE
Chief Complaint   Patient presents with    Benign Prostatic Hypertrophy     Pt having difficulty with urination      PVR: 411 mL    Pt was instructed today on clean intermittent catheterization. Using proper hygiene, pt was able to successfully pass a 14 fr coude Jeovany VaPro coude catheter. Pt has permanent urinary retention. Pt is unable to pass a straight tip catheter due to enlarged prostate, and requires a coude tip catheter. Per Dr. Plunkett, pt is to do CIC 4 X daily. Pt states he prefers to use [employer] for supplier. Pt was sent home with 4 days worth of catheter samples    Jackelyn Mejia CMA

## 2024-04-26 NOTE — LETTER
4/26/2024       RE: Jorge Salomon  [address] [facility] [address]     Dear Colleague,    Thank you for referring your patient, Jorge Salomon, to the [facility] [place] [facility]. Please see a copy of my visit note below.    CHIEF COMPLAINT   Jorge Salomon who is a 89 year old male returns today for follow-up of [place] 3+3 = 6 prostate cancer s/p radiation 2017, BPH with LUTS on avodart and flomax.      HPI   Jorge Salomon is a 89 year old male returns today for follow-up of Ricardo 3+3 = 6 prostate cancer s/p radiation 2017, BPH with LUTS on avodart and flomax.      Patient is recovering from shingles on the right upper thigh    He is taking tamuslosin 0.4 mg daily and avodart daily    Today patient has only been able to empty a few ml's and feels uncomfortable but doesn't like idea of catheter    Patient is a 89 year old  male   Vitals: There were no vitals taken for this visit.  There is no height or weight on file to calculate BMI.  General Appearance Adult:   Alert, no acute distress, oriented  HENT: throat/mouth:normal, good dentition  Lungs: no respiratory distress, or pursed lip breathing  Heart: No obvious jugular venous distension present  Abdomen: nondistended  Musculoskeltal: extremities normal, no peripheral edema  Skin: no suspicious lesions or rashes  Neuro: Alert, oriented, speech and mentation normal  Psych: affect and mood normal  Gait: Normal  : deferred    Component      Latest Ref Rng 4/23/2024  2:57 PM   Color Urine      Colorless, Straw, Light Yellow, Yellow  Yellow    Appearance Urine      Clear  Clear    Glucose Urine      Negative mg/dL Negative    Bilirubin Urine      Negative  Negative    Ketones Urine      Negative mg/dL Negative    Specific Gravity Urine      1.003 - 1.035  1.015    Blood Urine      Negative  Negative    pH Urine      5.0 - 7.0  7.0    Protein Albumin Urine       Negative mg/dL Negative    Urobilinogen Urine      0.2, 1.0 E.U./dL 0.2    Nitrite Urine      Negative  Negative    Leukocyte Esterase Urine      Negative  Negative      PVR 411ml today (378 ml a few days ago)    ASSESSMENT and PLAN  89 year old male returns today for follow-up of [place] 3+3 = 6 prostate cancer s/p radiation 2017, BPH with LUTS on avodart and flomax.      Worsening urinary symptoms with incomplete bladder emptying, will try to increase alpha blocker. Possible he may be developing radiation-related stricture (but no blood on UA). He says he is recovering from shingles in his lower extremity, not sure if this is contributing    - increase tamsulosin to 0.8 mg daily (rx drug management)  - intermittent catheter teaching, four times a day  - follow up in June as scheduled with PSA prior, hopefully will eventually be able to urinate spontaneously on increased tamsulosin dose (as well as recovery from shingles)    Geremias Plunkett MD   [facility] Urology  [facility] Phone: [phone]

## 2024-05-02 ENCOUNTER — TELEPHONE (OUTPATIENT)
Dept: UROLOGY | Facility: CLINIC | Age: 89
End: 2024-05-02
Payer: COMMERCIAL

## 2024-05-02 NOTE — TELEPHONE ENCOUNTER
[facility] Call Center    Phone Message    May a detailed message be left on voicemail: no     Reason for Call: Other: Caleb from [facility] called to let the clinic know that the catheter orders have been released but the medical records have to be signed by the doctor. Please call back if there is any follow up needed.     Action Taken: Other: [facility]    Travel Screening: Not Applicable

## 2024-05-03 ENCOUNTER — ALLIED HEALTH/NURSE VISIT (OUTPATIENT)
Dept: UROLOGY | Facility: CLINIC | Age: 89
End: 2024-05-03
Payer: COMMERCIAL

## 2024-05-03 DIAGNOSIS — R39.89 SUSPECTED UTI: Primary | ICD-10-CM

## 2024-05-03 LAB
ALBUMIN UR-MCNC: 100 MG/DL
APPEARANCE UR: ABNORMAL
BILIRUB UR QL STRIP: NEGATIVE
COLOR UR AUTO: ABNORMAL
GLUCOSE UR STRIP-MCNC: NEGATIVE MG/DL
HGB UR QL STRIP: ABNORMAL
KETONES UR STRIP-MCNC: NEGATIVE MG/DL
LEUKOCYTE ESTERASE UR QL STRIP: ABNORMAL
NITRATE UR QL: POSITIVE
PH UR STRIP: 6 [PH] (ref 5–7)
SP GR UR STRIP: >=1.03 (ref 1–1.03)
UROBILINOGEN UR STRIP-ACNC: 0.2 E.U./DL

## 2024-05-03 PROCEDURE — 99207 PR NO CHARGE NURSE ONLY: CPT

## 2024-05-03 PROCEDURE — 87186 SC STD MICRODIL/AGAR DIL: CPT

## 2024-05-03 PROCEDURE — 87086 URINE CULTURE/COLONY COUNT: CPT

## 2024-05-03 PROCEDURE — 87088 URINE BACTERIA CULTURE: CPT

## 2024-05-03 PROCEDURE — 81003 URINALYSIS AUTO W/O SCOPE: CPT | Mod: QW

## 2024-05-03 RX ORDER — NITROFURANTOIN 25; 75 MG/1; MG/1
100 CAPSULE ORAL 2 TIMES DAILY
Qty: 14 CAPSULE | Refills: 0 | Status: SHIPPED | OUTPATIENT
Start: 2024-05-03 | End: 2024-05-10

## 2024-05-03 NOTE — PROGRESS NOTES
Jorge Salomon comes into clinic today at the request of Dr. Plunkett Ordering Provider for [facility].    Pt presents to clinic today for suspected UTI. Pt states that he feels discomfort in his bladder area. Pt's urine was collected and this was sent for culture.    This service provided today was under the supervising provider of the day Dr. West, who was available if needed.    Jackelyn Mejia, CMA

## 2024-05-05 LAB — BACTERIA UR CULT: ABNORMAL

## 2024-05-06 ENCOUNTER — TELEPHONE (OUTPATIENT)
Facility: CLINIC | Age: 89
End: 2024-05-06
Payer: COMMERCIAL

## 2024-05-06 DIAGNOSIS — N39.0 URINARY TRACT INFECTION: Primary | ICD-10-CM

## 2024-05-06 RX ORDER — SULFAMETHOXAZOLE/TRIMETHOPRIM 800-160 MG
1 TABLET ORAL 2 TIMES DAILY
Qty: 14 TABLET | Refills: 0 | Status: SHIPPED | OUTPATIENT
Start: 2024-05-06 | End: 2024-05-13

## 2024-05-06 NOTE — TELEPHONE ENCOUNTER
Patient was treated with Macrobid. Culture shows that it is intermediate.  Please advise.  Yanira Hu LPN

## 2024-05-13 ASSESSMENT — ASTHMA QUESTIONNAIRES
QUESTION_5 LAST FOUR WEEKS HOW WOULD YOU RATE YOUR ASTHMA CONTROL: COMPLETELY CONTROLLED
QUESTION_4 LAST FOUR WEEKS HOW OFTEN HAVE YOU USED YOUR RESCUE INHALER OR NEBULIZER MEDICATION (SUCH AS ALBUTEROL): NOT AT ALL
QUESTION_2 LAST FOUR WEEKS HOW OFTEN HAVE YOU HAD SHORTNESS OF BREATH: ONCE A DAY
QUESTION_3 LAST FOUR WEEKS HOW OFTEN DID YOUR ASTHMA SYMPTOMS (WHEEZING, COUGHING, SHORTNESS OF BREATH, CHEST TIGHTNESS OR PAIN) WAKE YOU UP AT NIGHT OR EARLIER THAN USUAL IN THE MORNING: NOT AT ALL
ACT_TOTALSCORE: 22
ACT_TOTALSCORE: 22
QUESTION_1 LAST FOUR WEEKS HOW MUCH OF THE TIME DID YOUR ASTHMA KEEP YOU FROM GETTING AS MUCH DONE AT WORK, SCHOOL OR AT HOME: NONE OF THE TIME

## 2024-05-14 ENCOUNTER — OFFICE VISIT (OUTPATIENT)
Dept: INTERNAL MEDICINE | Facility: CLINIC | Age: 89
End: 2024-05-14
Payer: COMMERCIAL

## 2024-05-14 VITALS
RESPIRATION RATE: 16 BRPM | TEMPERATURE: 97.7 F | BODY MASS INDEX: 24.9 KG/M2 | SYSTOLIC BLOOD PRESSURE: 126 MMHG | HEART RATE: 98 BPM | HEIGHT: 74 IN | DIASTOLIC BLOOD PRESSURE: 62 MMHG | WEIGHT: 194 LBS | OXYGEN SATURATION: 96 %

## 2024-05-14 DIAGNOSIS — E78.5 HYPERLIPIDEMIA LDL GOAL <100: ICD-10-CM

## 2024-05-14 DIAGNOSIS — B02.29 POST HERPETIC NEURALGIA: ICD-10-CM

## 2024-05-14 DIAGNOSIS — R33.9 URINARY RETENTION WITH INCOMPLETE BLADDER EMPTYING: ICD-10-CM

## 2024-05-14 DIAGNOSIS — M35.00 SJOGREN'S SYNDROME WITHOUT EXTRAGLANDULAR INVOLVEMENT (H): ICD-10-CM

## 2024-05-14 DIAGNOSIS — I71.40 ABDOMINAL AORTIC ANEURYSM (AAA) WITHOUT RUPTURE, UNSPECIFIED PART (H): ICD-10-CM

## 2024-05-14 DIAGNOSIS — R10.31 ABDOMINAL PAIN, RIGHT LOWER QUADRANT: Primary | ICD-10-CM

## 2024-05-14 DIAGNOSIS — I50.32 CHRONIC DIASTOLIC HEART FAILURE (H): ICD-10-CM

## 2024-05-14 DIAGNOSIS — M06.09 RHEUMATOID ARTHRITIS, SERONEGATIVE, MULTIPLE SITES (H): ICD-10-CM

## 2024-05-14 DIAGNOSIS — B02.9 HERPES ZOSTER WITHOUT COMPLICATION: ICD-10-CM

## 2024-05-14 LAB
BASOPHILS # BLD AUTO: 0 10E3/UL (ref 0–0.2)
BASOPHILS NFR BLD AUTO: 0 %
EOSINOPHIL # BLD AUTO: 0 10E3/UL (ref 0–0.7)
EOSINOPHIL NFR BLD AUTO: 1 %
ERYTHROCYTE [DISTWIDTH] IN BLOOD BY AUTOMATED COUNT: 13.6 % (ref 10–15)
HCT VFR BLD AUTO: 38.3 % (ref 40–53)
HGB BLD-MCNC: 13.1 G/DL (ref 13.3–17.7)
IMM GRANULOCYTES # BLD: 0 10E3/UL
IMM GRANULOCYTES NFR BLD: 0 %
LYMPHOCYTES # BLD AUTO: 0.6 10E3/UL (ref 0.8–5.3)
LYMPHOCYTES NFR BLD AUTO: 11 %
MCH RBC QN AUTO: 35.5 PG (ref 26.5–33)
MCHC RBC AUTO-ENTMCNC: 34.2 G/DL (ref 31.5–36.5)
MCV RBC AUTO: 104 FL (ref 78–100)
MONOCYTES # BLD AUTO: 1 10E3/UL (ref 0–1.3)
MONOCYTES NFR BLD AUTO: 18 %
NEUTROPHILS # BLD AUTO: 3.9 10E3/UL (ref 1.6–8.3)
NEUTROPHILS NFR BLD AUTO: 71 %
PLATELET # BLD AUTO: 148 10E3/UL (ref 150–450)
RBC # BLD AUTO: 3.69 10E6/UL (ref 4.4–5.9)
WBC # BLD AUTO: 5.5 10E3/UL (ref 4–11)

## 2024-05-14 PROCEDURE — 99215 OFFICE O/P EST HI 40 MIN: CPT | Performed by: INTERNAL MEDICINE

## 2024-05-14 PROCEDURE — 85025 COMPLETE CBC W/AUTO DIFF WBC: CPT | Performed by: INTERNAL MEDICINE

## 2024-05-14 PROCEDURE — G2211 COMPLEX E/M VISIT ADD ON: HCPCS | Performed by: INTERNAL MEDICINE

## 2024-05-14 PROCEDURE — 80061 LIPID PANEL: CPT | Performed by: INTERNAL MEDICINE

## 2024-05-14 PROCEDURE — 36415 COLL VENOUS BLD VENIPUNCTURE: CPT | Performed by: INTERNAL MEDICINE

## 2024-05-14 PROCEDURE — 80053 COMPREHEN METABOLIC PANEL: CPT | Performed by: INTERNAL MEDICINE

## 2024-05-14 RX ORDER — RESPIRATORY SYNCYTIAL VIRUS VACCINE 120MCG/0.5
0.5 KIT INTRAMUSCULAR ONCE
Qty: 1 EACH | Refills: 0 | Status: CANCELLED | OUTPATIENT
Start: 2024-05-14 | End: 2024-05-14

## 2024-05-14 NOTE — PATIENT INSTRUCTIONS
I suspect that the right lower abdominal discomfort is mostly related to the shingles after-effects.     No evidence for hernia, bowel obstruction, appendicitis or diverticulitis.   No real evidence for cancer--will leave repeat PSA testing up to Dr Plunkett as planned.     Unfortunately, a plain X-ray won't give us the information that we'd like. A CT scan could be considered if needed, but I think this could be deferred based upon above observations.     Will update needed labs.     Follow up with Dr Plunkett as planned, with Dr Littlejohn in around 2/2025.    See me for a Wellness exam before the end of the year if possible.

## 2024-05-14 NOTE — PROGRESS NOTES
"Face to face time with patient: 44 minutes ([phone])       Assessment & Plan   (R10.31) Abdominal pain, right lower quadrant  (primary encounter diagnosis)  Comment: Suspect related to recent shingles. Reassurance provided regarding absence of hernia or findings to suggest tumor or bowel obstruction. He requests labs for his \"blood counts, liver and kidney\"--CMP and CBC ordered.   Plan: CBC with Platelets & Differential,         Comprehensive metabolic panel          (B02.9) Herpes zoster without complication  Comment: Primary lesions appear healed.     (B02.29) Post herpetic neuralgia  Comment: May have an element of post-herpetic neuralgia. He is not interested in adding a medication empirically for this.     (E78.5) Hyperlipidemia LDL goal <100  Comment: Update lipids.   Plan: Lipid panel reflex to direct LDL Non-fasting          (R33.9) Urinary retention with incomplete bladder emptying  Comment: Continue straight caths and f/uwith Urology as they advise. Will defer PSA testing to them.     (M06.09) Rheumatoid arthritis, seronegative, multiple sites (H)  (M35.00) Sjogren's syndrome without extraglandular involvement (H24)  Comment: Chronic stable conditions. Follow with Rheumatology as they advise.    (I50.32) Chronic diastolic heart failure (H)  (I71.40) Abdominal aortic aneurysm (AAA) without rupture, unspecified part (H24)  Comment: Chronic stable conditions. Follow up with cardiology in February 2025 as they advise.         BMI  Estimated body mass index is 25.25 kg/m  as calculated from the following:    Height as of this encounter: 1.867 m (6' 1.5\").    Weight as of this encounter: 88 kg (194 lb).         Patient Instructions   I suspect that the right lower abdominal discomfort is mostly related to the shingles after-effects.     No evidence for hernia, bowel obstruction, appendicitis or diverticulitis.   No real evidence for cancer--will leave repeat PSA testing up to Dr Plunkett as planned. "     Unfortunately, a plain X-ray won't give us the information that we'd like. A CT scan could be considered if needed, but I think this could be deferred based upon above observations.     Will update needed labs.     Follow up with Dr Plunkett as planned, with Dr Littlejohn in around 2/2025.    See me for a Wellness exam before the end of the year if possible.           Cassy Patel is a 89 year old, presenting for the following health issues:  Abdominal Pain and Shingles      5/14/2024     8:17 AM   Additional Questions   Roomed by Leah MG CMA     History of Present Illness       Reason for visit:  Shingles, abdominal pain, tiredness, urination problems  Symptom onset:  3-4 weeks ago  Symptoms include:  Pain in lower right torso(shingles), pain in my lower abdomen, difficulty urinating  Symptom intensity:  Moderate  Symptom progression:  Staying the same  Had these symptoms before:  No    He eats 2-3 servings of fruits and vegetables daily.He consumes 1 sweetened beverage(s) daily.He exercises with enough effort to increase his heart rate 20 to 29 minutes per day.  He exercises with enough effort to increase his heart rate 3 or less days per week.   He is taking medications regularly.     The patient first noted a vesicular red skin rash over his right groin, upper thigh and lower back on about 4/10/2024.   He presented to urgent care on 4/12/2024 and  was prescribed valcyclovir, 1 gm po TID  x 7d.   He returned to urgent care on 4/15/2024 with worsening rash and was prescribed cephalexin, diflucan and lotrimin cream.     He has developed a persistent discomfort, predominantly in the right groin. He has numbness in his right thigh. The lesions have all healed. He expresses concern about a possible hernia, bowel obstruction or return of cancer. He asks for labs and an X-ray. Advised him that an X-ray will not exclude a cancer, which he reports was his primary reason for requesting it. We discussed that an abdominal  "CT scan would do a more reliable job in excluding a mass. Advised him that a bowel obstruction is unlikely on clinical grounds.    He reports that his \"urine froze up\" on 4/23/2024, and he has seen Urology who noted incomplete bladder emptying and has advised self catheterizations.   He was diagnosed with a UTI on 5/3 and was started on Macrobid. UC showed Citrobacter koseri which was of just intermediate sensitivity to nitrofurantoin, and he was switched to TMP/SMX twice daily for 7 days (just recently completed). No dysuria or hematuria. No fever or chills.     He reports losing 5-8 lb in the past couple of months, with reduced appetite. No vomiting. He tends to constipation but has formed stools at least every other day, no melena or hematochezia.     He continues to follow with Rheumatology for rheumatoid arthritis and Sjogren's syndrome.     Past medical, family and social histories as well as medications reviewed and updated as needed.    REVIEW OF SYSTEMS: The following systems have been completely reviewed and are negative except as noted above:   Constitutional, gastrointestinal, genitourinary, musculoskeletal, dermatologic, and neurologic systems.        Objective    /62 (BP Location: Left arm, Patient Position: Sitting, Cuff Size: Adult Large)   Pulse 98   Temp 97.7  F (36.5  C) (Tympanic)   Resp 16   Ht 1.867 m (6' 1.5\")   Wt 88 kg (194 lb)   SpO2 96%   BMI 25.25 kg/m    Body mass index is 25.25 kg/m .    Physical Exam   GENERAL: alert and no distress  EYES: Eyes grossly normal to inspection, PERRL and conjunctivae and sclerae normal  RESP: lungs clear to auscultation - no rales, rhonchi or wheezes  CV: regular rate and rhythm, normal S1 S2, no S3 or S4, no murmur, click or rub, no peripheral edema  ABDOMEN: soft, nontender, no hepatosplenomegaly, no masses and bowel sounds normal  MS: no gross musculoskeletal defects noted, no edema  SKIN: Well-healed lesions in an apparent (approximately) " right L1 to L2 dermatomal distribution.   NEURO: Normal strength and tone, mentation intact and speech normal  PSYCH: mentation appears normal, affect normal/bright          Signed Electronically by: Srinivasa Tello MD,

## 2024-05-15 ENCOUNTER — MYC MEDICAL ADVICE (OUTPATIENT)
Dept: INTERNAL MEDICINE | Facility: CLINIC | Age: 89
End: 2024-05-15

## 2024-05-15 LAB
ALBUMIN SERPL BCG-MCNC: 4.4 G/DL (ref 3.5–5.2)
ALP SERPL-CCNC: 50 U/L (ref 40–150)
ALT SERPL W P-5'-P-CCNC: 14 U/L (ref 0–70)
ANION GAP SERPL CALCULATED.3IONS-SCNC: 7 MMOL/L (ref 7–15)
AST SERPL W P-5'-P-CCNC: 20 U/L (ref 0–45)
BILIRUB SERPL-MCNC: 0.6 MG/DL
BUN SERPL-MCNC: 16.7 MG/DL (ref 8–23)
CALCIUM SERPL-MCNC: 9.5 MG/DL (ref 8.2–9.6)
CHLORIDE SERPL-SCNC: 99 MMOL/L (ref 98–107)
CHOLEST SERPL-MCNC: 126 MG/DL
CREAT SERPL-MCNC: 1.18 MG/DL (ref 0.67–1.17)
DEPRECATED HCO3 PLAS-SCNC: 28 MMOL/L (ref 22–29)
EGFRCR SERPLBLD CKD-EPI 2021: 58 ML/MIN/1.73M2
FASTING STATUS PATIENT QL REPORTED: YES
FASTING STATUS PATIENT QL REPORTED: YES
GLUCOSE SERPL-MCNC: 90 MG/DL (ref 70–99)
HDLC SERPL-MCNC: 47 MG/DL
LDLC SERPL CALC-MCNC: 67 MG/DL
NONHDLC SERPL-MCNC: 79 MG/DL
POTASSIUM SERPL-SCNC: 4.8 MMOL/L (ref 3.4–5.3)
PROT SERPL-MCNC: 7 G/DL (ref 6.4–8.3)
SODIUM SERPL-SCNC: 134 MMOL/L (ref 135–145)
TRIGL SERPL-MCNC: 58 MG/DL

## 2024-05-16 NOTE — TELEPHONE ENCOUNTER
Please see patient's mychart message below.      Please advise, thanks.    Rell Michaud, Triage RN [place]  11:15 AM 5/16/2024

## 2024-05-20 ENCOUNTER — TELEPHONE (OUTPATIENT)
Dept: INTERNAL MEDICINE | Facility: CLINIC | Age: 89
End: 2024-05-20
Payer: COMMERCIAL

## 2024-05-20 NOTE — TELEPHONE ENCOUNTER
Reason for Call:  Appointment Request    Patient requesting this type of appt:  follow-up lab results    Requested provider: Srinivasa Tello    Reason patient unable to be scheduled: Not within requested timeframe    When does patient want to be seen/preferred time:  asap    Comments: patient wondering if he could be worked in f    Could we send this information to you in [employer] or would you prefer to receive a phone call?:   No preference   Okay to leave a detailed message?: Yes [phone]    Call taken on 5/20/2024 at 9:53 AM by Leona García

## 2024-05-21 ENCOUNTER — OFFICE VISIT (OUTPATIENT)
Dept: INTERNAL MEDICINE | Facility: CLINIC | Age: 89
End: 2024-05-21
Payer: COMMERCIAL

## 2024-05-21 ENCOUNTER — HOSPITAL ENCOUNTER (OUTPATIENT)
Dept: CT IMAGING | Facility: CLINIC | Age: 89
Discharge: HOME | End: 2024-05-21
Attending: PREVENTIVE MEDICINE | Admitting: PREVENTIVE MEDICINE
Payer: COMMERCIAL

## 2024-05-21 ENCOUNTER — OFFICE VISIT (OUTPATIENT)
Dept: PEDIATRICS | Facility: CLINIC | Age: 89
End: 2024-05-21
Payer: COMMERCIAL

## 2024-05-21 VITALS
SYSTOLIC BLOOD PRESSURE: 120 MMHG | BODY MASS INDEX: 25.38 KG/M2 | DIASTOLIC BLOOD PRESSURE: 75 MMHG | WEIGHT: 195 LBS | RESPIRATION RATE: 18 BRPM | TEMPERATURE: 97.8 F | HEART RATE: 84 BPM | OXYGEN SATURATION: 95 %

## 2024-05-21 VITALS
SYSTOLIC BLOOD PRESSURE: 123 MMHG | TEMPERATURE: 98.3 F | DIASTOLIC BLOOD PRESSURE: 71 MMHG | BODY MASS INDEX: 25.03 KG/M2 | OXYGEN SATURATION: 95 % | RESPIRATION RATE: 16 BRPM | WEIGHT: 195 LBS | HEART RATE: 99 BPM | HEIGHT: 74 IN

## 2024-05-21 DIAGNOSIS — R10.9 ABDOMINAL SPASMS: ICD-10-CM

## 2024-05-21 DIAGNOSIS — R33.8 BENIGN PROSTATIC HYPERPLASIA WITH URINARY RETENTION: Primary | ICD-10-CM

## 2024-05-21 DIAGNOSIS — R10.84 ABDOMINAL PAIN, GENERALIZED: Primary | ICD-10-CM

## 2024-05-21 DIAGNOSIS — I71.43 INFRARENAL ABDOMINAL AORTIC ANEURYSM (AAA) WITHOUT RUPTURE (H): ICD-10-CM

## 2024-05-21 DIAGNOSIS — K59.00 CONSTIPATION, UNSPECIFIED CONSTIPATION TYPE: ICD-10-CM

## 2024-05-21 DIAGNOSIS — R10.31 ABDOMINAL PAIN, RIGHT LOWER QUADRANT: ICD-10-CM

## 2024-05-21 DIAGNOSIS — N40.1 BENIGN PROSTATIC HYPERPLASIA WITH URINARY RETENTION: Primary | ICD-10-CM

## 2024-05-21 DIAGNOSIS — R10.30 LOWER ABDOMINAL PAIN: ICD-10-CM

## 2024-05-21 DIAGNOSIS — B02.29 POST HERPETIC NEURALGIA: ICD-10-CM

## 2024-05-21 DIAGNOSIS — R31.29 MICROSCOPIC HEMATURIA: ICD-10-CM

## 2024-05-21 LAB
ALBUMIN SERPL BCG-MCNC: 4.3 G/DL (ref 3.5–5.2)
ALBUMIN UR-MCNC: NEGATIVE MG/DL
ALP SERPL-CCNC: 53 U/L (ref 40–150)
ALT SERPL W P-5'-P-CCNC: 14 U/L (ref 0–70)
ANION GAP SERPL CALCULATED.3IONS-SCNC: 8 MMOL/L (ref 7–15)
APPEARANCE UR: CLEAR
AST SERPL W P-5'-P-CCNC: 20 U/L (ref 0–45)
BACTERIA #/AREA URNS HPF: ABNORMAL /HPF
BASOPHILS # BLD AUTO: 0 10E3/UL (ref 0–0.2)
BASOPHILS NFR BLD AUTO: 0 %
BILIRUB SERPL-MCNC: 0.5 MG/DL
BILIRUB UR QL STRIP: NEGATIVE
BUN SERPL-MCNC: 15.8 MG/DL (ref 8–23)
CALCIUM SERPL-MCNC: 8.9 MG/DL (ref 8.2–9.6)
CHLORIDE SERPL-SCNC: 98 MMOL/L (ref 98–107)
COLOR UR AUTO: YELLOW
CREAT SERPL-MCNC: 0.87 MG/DL (ref 0.67–1.17)
DEPRECATED HCO3 PLAS-SCNC: 26 MMOL/L (ref 22–29)
EGFRCR SERPLBLD CKD-EPI 2021: 81 ML/MIN/1.73M2
EOSINOPHIL # BLD AUTO: 0 10E3/UL (ref 0–0.7)
EOSINOPHIL NFR BLD AUTO: 1 %
ERYTHROCYTE [DISTWIDTH] IN BLOOD BY AUTOMATED COUNT: 13.3 % (ref 10–15)
GLUCOSE SERPL-MCNC: 105 MG/DL (ref 70–99)
GLUCOSE UR STRIP-MCNC: NEGATIVE MG/DL
HCT VFR BLD AUTO: 38.1 % (ref 40–53)
HGB BLD-MCNC: 13.2 G/DL (ref 13.3–17.7)
HGB UR QL STRIP: ABNORMAL
IMM GRANULOCYTES # BLD: 0 10E3/UL
IMM GRANULOCYTES NFR BLD: 0 %
KETONES UR STRIP-MCNC: NEGATIVE MG/DL
LEUKOCYTE ESTERASE UR QL STRIP: NEGATIVE
LIPASE SERPL-CCNC: 17 U/L (ref 13–60)
LYMPHOCYTES # BLD AUTO: 0.6 10E3/UL (ref 0.8–5.3)
LYMPHOCYTES NFR BLD AUTO: 13 %
MCH RBC QN AUTO: 36.5 PG (ref 26.5–33)
MCHC RBC AUTO-ENTMCNC: 34.6 G/DL (ref 31.5–36.5)
MCV RBC AUTO: 105 FL (ref 78–100)
MONOCYTES # BLD AUTO: 0.8 10E3/UL (ref 0–1.3)
MONOCYTES NFR BLD AUTO: 17 %
MUCOUS THREADS #/AREA URNS LPF: PRESENT /LPF
NEUTROPHILS # BLD AUTO: 3.1 10E3/UL (ref 1.6–8.3)
NEUTROPHILS NFR BLD AUTO: 69 %
NITRATE UR QL: NEGATIVE
NRBC # BLD AUTO: 0 10E3/UL
NRBC BLD AUTO-RTO: 0 /100
PH UR STRIP: 7.5 [PH] (ref 5–7)
PLATELET # BLD AUTO: 161 10E3/UL (ref 150–450)
POTASSIUM SERPL-SCNC: 4.4 MMOL/L (ref 3.4–5.3)
PROT SERPL-MCNC: 7.2 G/DL (ref 6.4–8.3)
RBC # BLD AUTO: 3.62 10E6/UL (ref 4.4–5.9)
RBC #/AREA URNS AUTO: ABNORMAL /HPF
SODIUM SERPL-SCNC: 132 MMOL/L (ref 135–145)
SP GR UR STRIP: 1.02 (ref 1–1.03)
SQUAMOUS #/AREA URNS AUTO: ABNORMAL /LPF
UROBILINOGEN UR STRIP-ACNC: 0.2 E.U./DL
WBC # BLD AUTO: 4.5 10E3/UL (ref 4–11)
WBC #/AREA URNS AUTO: ABNORMAL /HPF

## 2024-05-21 PROCEDURE — 250N000011 HC RX IP 250 OP 636: Performed by: PREVENTIVE MEDICINE

## 2024-05-21 PROCEDURE — 99207 REFERRAL TO ACUTE AND DIAGNOSTIC SERVICES: CPT

## 2024-05-21 PROCEDURE — 74177 CT ABD & PELVIS W/CONTRAST: CPT

## 2024-05-21 PROCEDURE — 81001 URINALYSIS AUTO W/SCOPE: CPT

## 2024-05-21 PROCEDURE — 85025 COMPLETE CBC W/AUTO DIFF WBC: CPT | Performed by: PREVENTIVE MEDICINE

## 2024-05-21 PROCEDURE — 250N000009 HC RX 250: Performed by: PREVENTIVE MEDICINE

## 2024-05-21 PROCEDURE — 36415 COLL VENOUS BLD VENIPUNCTURE: CPT | Performed by: PREVENTIVE MEDICINE

## 2024-05-21 PROCEDURE — 99417 PROLNG OP E/M EACH 15 MIN: CPT | Performed by: PREVENTIVE MEDICINE

## 2024-05-21 PROCEDURE — 83690 ASSAY OF LIPASE: CPT | Performed by: PREVENTIVE MEDICINE

## 2024-05-21 PROCEDURE — 80053 COMPREHEN METABOLIC PANEL: CPT | Performed by: PREVENTIVE MEDICINE

## 2024-05-21 PROCEDURE — 99215 OFFICE O/P EST HI 40 MIN: CPT | Performed by: PREVENTIVE MEDICINE

## 2024-05-21 RX ORDER — IOPAMIDOL 755 MG/ML
500 INJECTION, SOLUTION INTRAVASCULAR ONCE
Status: COMPLETED | OUTPATIENT
Start: 2024-05-21 | End: 2024-05-21

## 2024-05-21 RX ORDER — RESPIRATORY SYNCYTIAL VIRUS VACCINE 120MCG/0.5
0.5 KIT INTRAMUSCULAR ONCE
Qty: 1 EACH | Refills: 0 | Status: CANCELLED | OUTPATIENT
Start: 2024-05-21 | End: 2024-05-21

## 2024-05-21 RX ADMIN — IOPAMIDOL 98 ML: 755 INJECTION, SOLUTION INTRAVENOUS at 15:22

## 2024-05-21 RX ADMIN — SODIUM CHLORIDE 64 ML: 9 INJECTION, SOLUTION INTRAVENOUS at 15:22

## 2024-05-21 NOTE — PATIENT INSTRUCTIONS
Thanks for choosing [facility] for your medical care today.    You were seen for abdominal pain.    I suspect your pain is coming from urinary retention and post herpetic neuralgia.    Incidentally, your abdominal aortic aneurysm is larger today than it was at last check  3.5 cm today.  This is unrelated to your pain.      I would work with primary care and urology on these issues in follow up in the next 1-2 weeks.

## 2024-05-21 NOTE — PROGRESS NOTES
Acute and Diagnostic Services Clinic Visit    Assessment & Plan     (N40.1,  R33.8) Benign prostatic hyperplasia with urinary retention  (primary encounter diagnosis)    (B02.29) Post herpetic neuralgia    (K59.00) Constipation, unspecified constipation type    (R31.29) Microscopic hematuria    (I71.43) Infrarenal abdominal aortic aneurysm (AAA) without rupture (H24)    Labs, ct imaging reassuring  AAA now 3.5 cm compared to 3.0 cm in 2016/2017 - unrelated to pain.  Incidentally noted.  Microscopic hematuria likely from catheterization.  No evidence of uti.  Suspect urinary retention with self cathterization, post herpetic neuralgia and constipation are collectively contributing to patient's symptoms to lower abdominal pain.    Patient reassured.  Advise close follow up with urology of bph with urinary retention, microscopic hematuria.  Miralax, fiber for constipation.  Tylenol for post herpetic neuralgia    Follow up with pcp in 2 weeks for recheck.      76  minutes were spent doing chart review, history and exam, documentation and further activities per the note.        See Patient Instructions    No follow-ups on file.    Cassy Patel is a 89 year old, presenting for the following health issues:  Abdominal Pain (RLQ pain X 1 week, notes he is just recovering from shingles)    HPI     Abdominal/Flank Pain  Onset/Duration: X 1 week  Description:   Character: Dull ache  Location: right lower quadrant  Radiation: Pelvic area with spasms  Intensity: 2/10  Progression of Symptoms:  same  Accompanying Signs & Symptoms:  Fever/chills: no   Gas/Bloating: no   Nausea: no   Vomitting: no   Diarrhea: no   Constipation:YES- intermittent X 6 weeks  Dysuria: YES- notes he has self catheter X 4 a day           Hematuria: YES- when placing catheter at times           Frequency: no            Incontinence of urine: no   History:            Last bowel movement: today-normal  Trauma: no   Previous similar pain: no     Previous tests done: none           Previous Abdominal surgery: YES- left sided hernia repair   Precipitating factors:   Does the pain change with:     Food: no      Bowel Movement: no     Urination: no              Other factors: no   Therapies tried and outcome:  None    When food last eaten: 11:45 AM grapes, orange, potato chips, milk.    This is a 89 yo man who developed shingles in the r lower quadrant of his abdomen 6 weeks ago.  Also has developed urinary retention from bph recently.  Now self catheterizing.  Recently treated for uti.  Continues to have b lower abdominal pain that has been bothersome over the past week.  No fever or chills, n/v.  Has some harder stools.  No back pain.  Skin sensitive in rlq.    Review of Systems  Constitutional, HEENT, cardiovascular, pulmonary, GI, , musculoskeletal, neuro, skin, endocrine and psych systems are negative, except as otherwise noted.      Objective    /75 (BP Location: Left arm, Patient Position: Chair, Cuff Size: Adult Large)   Pulse 84   Temp 97.8  F (36.6  C) (Oral)   Resp 18   Wt 88.5 kg (195 lb)   SpO2 95%   BMI 25.38 kg/m    Body mass index is 25.38 kg/m .  Physical Exam   GENERAL: alert and no distress  EYES: Eyes grossly normal to inspection, PERRL and conjunctivae and sclerae normal  HENT: ear canals and TM's normal, nose and mouth without ulcers or lesions  NECK: no adenopathy, no asymmetry, masses, or scars  RESP: lungs clear to auscultation - no rales, rhonchi or wheezes  CV: regular rate and rhythm, normal S1 S2, no S3 or S4, no murmur, click or rub, no peripheral edema  ABDOMEN: soft, mild ttp lower abdomen bilaterally, no guarding, no rebound, no hepatosplenomegaly, no masses and bowel sounds normal  MS: no gross musculoskeletal defects noted, no edema  SKIN: no suspicious lesions or rashes  NEURO: Normal strength and tone, mentation intact and speech normal  PSYCH: mentation appears normal, affect normal/bright            Signed  Electronically by: Mahin Aiken MD

## 2024-05-21 NOTE — PROGRESS NOTES
Assessment & Plan     (R10.84) Abdominal pain, generalized  (primary encounter diagnosis)  Comment: Patient presents to the clinic with a chief complaint of abdominal pain that is in his mid lower abdomen.  Patient states that sometimes radiates to the right lower quadrant.  He previously was worked up by his primary care provider last week and ruled out appendicitis or other abnormality such as masses or rash.  Today his pain persists and he also has some spasming.  He does have a history of bladder cancer and he is worried that that has returned.  He did recently just see his urologist at the beginning of May and was treated for a urinary tract infection.  He states his abdominal pain has been present for 1 week.    Plan:.Given his symptoms we will rule out urinary tract infection.  If negative will send over to acute diagnostic services for further evaluation through imaging    (R10.31) Abdominal pain, right lower quadrant  Comment: Patient presents to the clinic with a chief complaint of right lower quadrant pain that has been going on for 1 week.  He did just finish treatment for urinary tract infection therefore we will rule out infection with urinalysis.  Patient states he has been straight cathing himself for the last month due to loss of control of bladder to urinate on his own.  Plan: UA Macroscopic with reflex to Microscopic and         Culture - Clinic Collect, UA Microscopic with         Reflex to Culture, Referral to Acute and         Diagnostic Services (Day of diagnostic / First         order acute)        UA negative for UTI.  Will refer patient to acute diagnostic services for further workup.    (R10.9) Abdominal spasms  Comment: Patient presents to the clinic with a chief complaint of abdominal spasming off and on throughout the day.  She states it is not related with straight cathing or with urination.  Nothing seems to help or make it worse.  Patient does have a history of bladder cancer and  "would like to rule out recurrence.  Plan: Will refer to acute diagnostic services for further workup and have patient follow-up with urology.      30 minutes spent by me on the date of the encounter doing chart review, review of test results, interpretation of tests, patient visit, documentation, and discussion with other provider(s)                 Cassy Patel is a 89 year old, presenting for the following health issues:   Patient is here today for ongoing abdominal pain. He was diagnosed with shingles 6 weeks ago on the 10th of April.   The abdominal pain started about a week ago.   He is experiencing the nerve pain all on his right side and down his right leg. There was numbness but that is going away now. Its just very sensitive.   He has been straight cathing himself as he has been unable to urinate on his own. When he does this, there are a few drops of blood after which his urologist said is normal.   He saw his PCP on the 14th and ruled out diverticulitis, appendicitis or abdominal tumor on exam.   He has abdominal spasms as well- it can be on and off all day. Not severe just bothersome.   Sees urologist- was just treated for a UTI on May 3rd.     Follow Up (Labs and stomach pain with spasm)      5/21/2024    12:50 PM   Additional Questions   Roomed by Leah MG CMA     HPI               Review of Systems  Constitutional, HEENT, cardiovascular, pulmonary, gi and gu systems are negative, except as otherwise noted.      Objective    /71 (BP Location: Left arm, Patient Position: Sitting, Cuff Size: Adult Large)   Pulse 99   Temp 98.3  F (36.8  C) (Tympanic)   Resp 16   Ht 1.867 m (6' 1.5\")   Wt 88.5 kg (195 lb)   SpO2 95%   BMI 25.38 kg/m    Body mass index is 25.38 kg/m .  Physical Exam   GENERAL: alert and no distress  NECK: no adenopathy, no asymmetry, masses, or scars  RESP: lungs clear to auscultation - no rales, rhonchi or wheezes  CV: regular rate and rhythm, normal S1 S2, no S3 or " S4, no murmur, click or rub, no peripheral edema  ABDOMEN: soft, nontender, no hepatosplenomegaly, no masses and bowel sounds normal  MS: no gross musculoskeletal defects noted, no edema            Signed Electronically by: SASHA Dorsey CNP

## 2024-05-22 ENCOUNTER — ALLIED HEALTH/NURSE VISIT (OUTPATIENT)
Dept: UROLOGY | Facility: CLINIC | Age: 89
End: 2024-05-22
Payer: COMMERCIAL

## 2024-05-22 DIAGNOSIS — R33.9 INCOMPLETE BLADDER EMPTYING: Primary | ICD-10-CM

## 2024-05-22 LAB — RESIDUAL VOLUME (RV) (EXTERNAL): 120

## 2024-05-22 PROCEDURE — 51798 US URINE CAPACITY MEASURE: CPT

## 2024-05-22 NOTE — PROGRESS NOTES
Jorge Salomon comes into clinic today for difficulty performing SIC.    Pt states he last self-cathed successfully at 2am and then when he tried again today he was not getting any urine output.  He states he does urinate on his own sometimes in between cathing, however us unsure if he urinated on his own in between his 2am SIC and this morning when he attempted SIC again.    Patient wanted to leave a urine specimen, however he just had a UA yesterday.  Denies dysuria or fever.    PVR: 120 mL by bladder scan    Patient was observed performing SIC here in office after PVR was measured.  Patient had good technique and was able to drain 125 mL from his bladder.    Patient was informed he may not have had enough urine in his bladder this morning to drain with the catheter.    Pt was provided with a measuring container and advised that if he consistently drains less than 200 mL when performing SIC, he may reduce the number of times he is self cathing per day by one, however if he starts draining over 500 mL with SIC, he should increase the number of times a day he is performing SIC by 1.    This service provided today was under the supervising provider of the day STEPHY Colon, who was available if needed.    Melani Noguera, EMT

## 2024-05-23 ENCOUNTER — HOSPITAL ENCOUNTER (EMERGENCY)
Facility: CLINIC | Age: 89
Discharge: HOME | End: 2024-05-23
Attending: STUDENT IN AN ORGANIZED HEALTH CARE EDUCATION/TRAINING PROGRAM | Admitting: STUDENT IN AN ORGANIZED HEALTH CARE EDUCATION/TRAINING PROGRAM
Payer: COMMERCIAL

## 2024-05-23 ENCOUNTER — TELEPHONE (OUTPATIENT)
Facility: CLINIC | Age: 89
End: 2024-05-23
Payer: COMMERCIAL

## 2024-05-23 VITALS
SYSTOLIC BLOOD PRESSURE: 123 MMHG | HEIGHT: 73 IN | DIASTOLIC BLOOD PRESSURE: 69 MMHG | OXYGEN SATURATION: 93 % | WEIGHT: 194 LBS | RESPIRATION RATE: 18 BRPM | TEMPERATURE: 98.2 F | HEART RATE: 81 BPM | BODY MASS INDEX: 25.71 KG/M2

## 2024-05-23 DIAGNOSIS — B37.49 CANDIDA UTI: ICD-10-CM

## 2024-05-23 DIAGNOSIS — N39.0 COMPLICATED UTI (URINARY TRACT INFECTION): ICD-10-CM

## 2024-05-23 LAB
ALBUMIN UR-MCNC: 300 MG/DL
APPEARANCE UR: ABNORMAL
BILIRUB UR QL STRIP: NEGATIVE
CAOX CRY #/AREA URNS HPF: ABNORMAL /HPF
COLOR UR AUTO: YELLOW
GLUCOSE UR STRIP-MCNC: NEGATIVE MG/DL
HGB UR QL STRIP: ABNORMAL
KETONES UR STRIP-MCNC: NEGATIVE MG/DL
LEUKOCYTE ESTERASE UR QL STRIP: ABNORMAL
MUCOUS THREADS #/AREA URNS LPF: PRESENT /LPF
NITRATE UR QL: NEGATIVE
PH UR STRIP: 6.5 [PH] (ref 5–7)
RBC URINE: >182 /HPF
SP GR UR STRIP: 1.02 (ref 1–1.03)
UROBILINOGEN UR STRIP-MCNC: NORMAL MG/DL
WBC CLUMPS #/AREA URNS HPF: PRESENT /HPF
WBC URINE: >182 /HPF
YEAST #/AREA URNS HPF: ABNORMAL /HPF

## 2024-05-23 PROCEDURE — 250N000013 HC RX MED GY IP 250 OP 250 PS 637: Performed by: STUDENT IN AN ORGANIZED HEALTH CARE EDUCATION/TRAINING PROGRAM

## 2024-05-23 PROCEDURE — 87186 SC STD MICRODIL/AGAR DIL: CPT | Performed by: EMERGENCY MEDICINE

## 2024-05-23 PROCEDURE — 81001 URINALYSIS AUTO W/SCOPE: CPT | Performed by: EMERGENCY MEDICINE

## 2024-05-23 PROCEDURE — 87086 URINE CULTURE/COLONY COUNT: CPT | Performed by: EMERGENCY MEDICINE

## 2024-05-23 PROCEDURE — 51798 US URINE CAPACITY MEASURE: CPT

## 2024-05-23 PROCEDURE — 99284 EMERGENCY DEPT VISIT MOD MDM: CPT | Mod: 25

## 2024-05-23 RX ORDER — FLUCONAZOLE 200 MG/1
200 TABLET ORAL DAILY
Qty: 14 TABLET | Refills: 0 | Status: SHIPPED | OUTPATIENT
Start: 2024-05-23 | End: 2024-06-06

## 2024-05-23 RX ORDER — SULFAMETHOXAZOLE/TRIMETHOPRIM 800-160 MG
1 TABLET ORAL ONCE
Status: COMPLETED | OUTPATIENT
Start: 2024-05-23 | End: 2024-05-23

## 2024-05-23 RX ORDER — SULFAMETHOXAZOLE/TRIMETHOPRIM 800-160 MG
1 TABLET ORAL 2 TIMES DAILY
Qty: 20 TABLET | Refills: 0 | Status: SHIPPED | OUTPATIENT
Start: 2024-05-23 | End: 2024-06-02

## 2024-05-23 RX ORDER — FLUCONAZOLE 200 MG/1
200 TABLET ORAL ONCE
Status: COMPLETED | OUTPATIENT
Start: 2024-05-23 | End: 2024-05-23

## 2024-05-23 RX ADMIN — SULFAMETHOXAZOLE AND TRIMETHOPRIM 1 TABLET: 800; 160 TABLET ORAL at 23:12

## 2024-05-23 RX ADMIN — FLUCONAZOLE 200 MG: 200 TABLET ORAL at 23:12

## 2024-05-23 ASSESSMENT — ACTIVITIES OF DAILY LIVING (ADL)
ADLS_ACUITY_SCORE: 33
ADLS_ACUITY_SCORE: 35

## 2024-05-23 ASSESSMENT — COLUMBIA-SUICIDE SEVERITY RATING SCALE - C-SSRS
1. IN THE PAST MONTH, HAVE YOU WISHED YOU WERE DEAD OR WISHED YOU COULD GO TO SLEEP AND NOT WAKE UP?: NO
2. HAVE YOU ACTUALLY HAD ANY THOUGHTS OF KILLING YOURSELF IN THE PAST MONTH?: NO
6. HAVE YOU EVER DONE ANYTHING, STARTED TO DO ANYTHING, OR PREPARED TO DO ANYTHING TO END YOUR LIFE?: NO

## 2024-05-23 NOTE — TELEPHONE ENCOUNTER
"Patient called with \"UTI\"   He is asymptomatic but viewed the urine from yesterday  at his PMD. I explained results to the patient, he verbalized understanding and will call if his sx's change.  Yanira Hu LPN   "

## 2024-05-24 ENCOUNTER — PATIENT OUTREACH (OUTPATIENT)
Dept: INTERNAL MEDICINE | Facility: CLINIC | Age: 89
End: 2024-05-24
Payer: COMMERCIAL

## 2024-05-24 NOTE — ED PROVIDER NOTES
Emergency Department Note      History of Present Illness     Chief Complaint  Dysuria    HPI  Jorge Salomon is a 89 year old male with a history of hyperlipidemia, chronic diastolic heart failure, Sjogren's syndrome, prostate cancer presents with dysuria. Patient states he went to the urologist four weeks ago and was catheterized because he reports that he didn't have any urinary outflow that was concurrent with shingles localized near his groin. Patient states that he has been since catheterizing himself. Patient reports that three weeks ago he had a UTI in which he was subsequently prescribed Macrobid that did not work in his estimation. Patient notes that he was then subscribed bactrim that resolved his infection, but he ran out of the prescription 10 days ago. Patient states that yesterday he had a CT pelvis that appeared fine. Patient reports that this morning he had spontaneous urine flow without a catheter. Patient states that later at 1500, he had the urge to go with not output. Patient states that he attempted to catheterize himself and still did not have any urinary output. Patient endorses lower abdominal pain close to his bladder. Of note, patient uses a 14 Pashto [place] catheter.    Independent Historian  None    Review of External Notes  I reviewed patient's note from their Urology visit yesterday  I reviewed patient's CT Abdomen Pelvis scan impression from 05/21/2024    Past Medical History   Medical History and Problem List  Abdominal aortic aneurysm  Neoplasm of other genitourinary organ  Obstructive sleep apnea  Seronegative rheumatoid arthritis  Sjogren's syndrome  Benign neoplasm of colon  Benign prostatic hyperplasia  Chronic diastolic heart failure  Orthopnea  Hyperlipidemia  Mitral valve disorder  Prostate cancer    Medications  Avodart  Lasix  Pravachol  Flomax  Plaquenil  Deltasone    Surgical History   Cystoscopy  Inguinal herniorrhaphy  Left eye cataracts surgery  Left  "vitreoretinal surgery  Sperm duct removal  Right eye cataracts surgery  Vasectomy  Physical Exam   Patient Vitals for the past 24 hrs:   BP Temp Temp src Pulse Resp SpO2 Height Weight   05/23/24 2215 139/72 -- -- -- -- -- -- --   05/23/24 2200 124/64 -- -- 87 -- 93 % -- --   05/23/24 2145 129/75 -- -- 92 -- 93 % -- --   05/23/24 2122 (!) 141/80 98.2  F (36.8  C) Oral 97 18 96 % 1.854 m (6' 1\") 88 kg (194 lb)     Physical Exam  General: Alert and cooperative with exam. Patient in no apparent distress. Normal mentation.  Head:  Scalp is NC/AT  Eyes:  No scleral icterus, PERRL  ENT:  The external nose and ears are normal.   Neck:  Normal range of motion without rigidity.  CV:  Regular rate and rhythm    No pathologic murmur   Resp:  Breath sounds are clear bilaterally    Non-labored, no retractions or accessory muscle use  GI:  Abdomen is soft, no distension, no tenderness. No peritoneal signs  MS:  No lower extremity edema   Skin:  Warm and dry, No rash or lesions noted.  Neuro:  Oriented x 3. No gross motor deficits.    Diagnostics   Lab Results   Labs Ordered and Resulted from Time of ED Arrival to Time of ED Departure   ROUTINE UA WITH MICROSCOPIC REFLEX TO CULTURE - Abnormal       Result Value    Color Urine Yellow      Appearance Urine Slightly Cloudy (*)     Glucose Urine Negative      Bilirubin Urine Negative      Ketones Urine Negative      Specific Gravity Urine 1.019      Blood Urine Large (*)     pH Urine 6.5      Protein Albumin Urine 300 (*)     Urobilinogen Urine Normal      Nitrite Urine Negative      Leukocyte Esterase Urine Large (*)     WBC Clumps Urine Present (*)     Budding Yeast Urine Many (*)     Mucus Urine Present (*)     Calcium Oxalate Crystals Urine Few (*)     RBC Urine >182 (*)     WBC Urine >182 (*)    URINE CULTURE       Imaging  No orders to display     Independent Interpretation  None  ED Course    Medications Administered  Medications   sulfamethoxazole-trimethoprim (BACTRIM DS) " 800-160 MG per tablet 1 tablet (has no administration in time range)   fluconazole (DIFLUCAN) tablet 200 mg (has no administration in time range)       Procedures  Procedures     Discussion of Management  None    Social Determinants of Health adding to complexity of care  None    ED Course  ED Course as of 05/23/24 2249   Thu May 23, 2024   2155 I obtained history and examined the patient as noted above       Medical Decision Making / Diagnosis   CMS Diagnoses: None    MIPS     None    [facility]  Jorge Salomon is a 89 year old male with history of BPH who presents with concern for urinary tract infection and retention. Reports symptom onset today. On exam, patient does not have any abdominal tenderness to palpation. No evidence of distension. Reports normal urine output this morning but increased urgency throughout the day. Was seen by urologist yesterday for similar complaint, had bladder scan completed showing only 125 mL urine present which was able be drained by SIC. Urine on 5/21 negative for infection and CT scan of abdomen/pelvis completed at that time negative for acute concerns, mild prostatomegaly present. Bladder scan was completed here at the bedside measuring 32 mL, patient was able to spontaneously void this without difficulty and urine sent for further testing. UA today very dark in color and now showing large amount of leukocyte esterase, budding yeast and WBC present. Also note large amount of blood which is likely due to ongoing SIC use. As patient recently was treated with bactrim with success, will restart bactrim treatment and additionally prescribe fluconazole for candida urinary infection. First doses of these medications provided prior to discharge. No evidence of pyelonephritis or sepsis, no indication for IV antibiotics or admission at this time. Patient is safe for discharge home. Instructed him to follow up with his urology team within the week for reassessment and to ensure symptomatic  improvement.     Disposition  The patient was discharged.     ICD-10 Codes:    ICD-10-CM    1. Complicated UTI (urinary tract infection)  N39.0       2. Candida UTI  B37.49            Discharge Medications  New Prescriptions    FLUCONAZOLE (DIFLUCAN) 200 MG TABLET    Take 1 tablet (200 mg) by mouth daily for 14 days    SULFAMETHOXAZOLE-TRIMETHOPRIM (BACTRIM DS) 800-160 MG TABLET    Take 1 tablet by mouth 2 times daily for 10 days         Scribe Disclosure:  I, Julian Zhao, am serving as a scribe at 9:55 PM on 5/23/2024 to document services personally performed by Magalie Brown PA-C based on my observations and the provider's statements to me.        Magalie Brown PA-C  05/23/24 1948

## 2024-05-24 NOTE — TELEPHONE ENCOUNTER
Transitions of Care Outreach  No chief complaint on file.      Most Recent Admission Date: 5/23/2024   Most Recent Admission Diagnosis:      Most Recent Discharge Date: 5/23/2024   Most Recent Discharge Diagnosis: Complicated UTI (urinary tract infection) - N39.0  Candida UTI - B37.49     Transitions of Care Assessment    Discharge Assessment  How are you doing now that you are home?: On my way to [facility] to  Bactrim and has a fungal infection  How are your symptoms? (Red Flag symptoms escalate to triage hotline per guidelines): Unchanged  Do you know how to contact your clinic care team if you have future questions or changes to your health status? : Yes  Does the patient have their discharge instructions? : Yes  Does the patient have questions regarding their discharge instructions? : No  Were you started on any new medications or were there changes to any of your previous medications? : Yes  Does the patient have all of their medications?: Yes  Do you have questions regarding any of your medications? : No  Do you have all of your needed medical supplies or equipment (DME)?  (i.e. oxygen tank, CPAP, cane, etc.): Yes    Follow up Plan     Discharge Follow-Up  Discharge follow up appointment scheduled in alignment with recommended follow up timeframe or Transitions of Risk Category? (Low = within 30 days; Moderate= within 14 days; High= within 7 days): Yes  Discharge Follow Up Appointment Scheduled with?: Specialty Care Provider  Patient's follow up appointment not scheduled: Patient declined scheduling support. Education on the importance of transitions of care follow up. Provided scheduling phone number.    Future Appointments   Date Time Provider Department Center   6/20/2024  9[facility]   6/27/2024  9:15 AM Geremias Plunkett MD [facility]       Outpatient Plan as outlined on AVS reviewed with patient.    For any urgent concerns, please contact our 24 hour nurse triage line:  [phone] ([phone])       Rain Durán RN

## 2024-05-24 NOTE — DISCHARGE INSTRUCTIONS
It appears you have a UTI along with yeast infection to your urine.  Antibiotics along with antifungal medications have been sent to your pharmacy, please pick this up tomorrow and begin taking.  Please also follow-up with urology within the week for reassessment and to ensure that symptoms are improving.  If symptoms are not improving with the use of this antibiotic or antifungal, medication change may be indicated.  If you develop worsening symptoms or develop urinary retention or you are unable to urinate or catheterize yourself successfully, please return to ER.

## 2024-05-24 NOTE — ED TRIAGE NOTES
"Pt here with c/o \"urinary blockage\". Pt straight caths himself for the past month. Pt endorses frequency and hematuria. Per pt, he was seen at clinic and did a CT scan which was negative. Denies flank pain. Hx of shingles that flared up about 6 weeks ago.         "

## 2024-05-26 ENCOUNTER — TELEPHONE (OUTPATIENT)
Dept: NURSING | Facility: CLINIC | Age: 89
End: 2024-05-26
Payer: COMMERCIAL

## 2024-05-26 LAB
BACTERIA UR CULT: ABNORMAL
BACTERIA UR CULT: ABNORMAL

## 2024-05-26 NOTE — TELEPHONE ENCOUNTER
[facility]    Reason for call: Lab Result Notification     Lab Result (including Rx patient on, if applicable).  If culture, copy of lab report at bottom.  Lab Result: See below    Prescribed sulfamethoxazole-trimethoprim (BACTRIM DS) 800-160 MG tablet BID x 10 days    Creatinine Level (mg/dl)   Creatinine   Date Value Ref Range Status   05/21/2024 0.87 0.67 - 1.17 mg/dL Final   05/19/2021 1.03 0.66 - 1.25 mg/dL Final    Creatinine clearance (ml/min), if applicable    Serum creatinine: 0.87 mg/dL 05/21/24 1454  Estimated creatinine clearance: 68.8 mL/min     Patient's current Symptoms:   Only taking for 2 days but states his symptoms are better.      RN Recommendations/Instructions per [place] ED lab result protocol:   [facility] ED lab result protocol utilized: Urine culture    Patient/care giver notified to contact your PCP clinic or return to the Emergency department if your:  Symptoms do not improve after 3 days on antibiotic.  Symptoms do not resolve after completing antibiotic.  Symptoms worsen or other concerning symptoms.    MAGI ELLISON RN

## 2024-05-28 ENCOUNTER — TELEPHONE (OUTPATIENT)
Dept: INTERNAL MEDICINE | Facility: CLINIC | Age: 89
End: 2024-05-28
Payer: COMMERCIAL

## 2024-05-28 NOTE — TELEPHONE ENCOUNTER
"Nurse Triage SBAR    Is this a 2nd Level Triage? YES, LICENSED PRACTITIONER REVIEW IS REQUIRED    Situation: Having \"minor bladder spasms\" for the past 3 days. Pain over bladder, intermittent throughout the entire day.  Better when in bed laying down. Spasms occur every few minutes all day long and patient rates the pain at 3/10.  He describes it as uncomfortable and twitching. He does have a history of constipation, last BM this morning was large.     Background: Patient treated for shingles low back 4/12, treated for bacterial and fungal skin infection same site and into groin 4/15. Seen in ADS 5/21 for urinary retention then treated for UTI while in ER 5/23. He has completed approximately half of his Bactrim course.    Assessment: Patient with UTI, possibly having bladder spasms.  He had been self catheterizing 4 times a day but today seems to be urinating on his own without difficulty.    Recommendation: Await response from provider with instructions.      Routed to provider    Does the patient meet one of the following criteria for ADS visit consideration? 16+ years old, with an [facility] PCP .  ALEIDA Camarillo R.N.    "

## 2024-05-28 NOTE — TELEPHONE ENCOUNTER
Recommend that he touch base with Urology. Presume that pains are from bladder spasms (any other apparent medical conditions being treated).     Would appreciate their input re: treatment for bladder spasms.

## 2024-05-29 DIAGNOSIS — N32.89 BLADDER SPASMS: ICD-10-CM

## 2024-05-29 DIAGNOSIS — N39.0 URINARY TRACT INFECTION: Primary | ICD-10-CM

## 2024-05-29 RX ORDER — OXYBUTYNIN CHLORIDE 5 MG/1
5 TABLET ORAL 3 TIMES DAILY
Qty: 90 TABLET | Refills: 0 | Status: SHIPPED | OUTPATIENT
Start: 2024-05-29 | End: 2024-06-28

## 2024-05-29 RX ORDER — SULFAMETHOXAZOLE/TRIMETHOPRIM 800-160 MG
1 TABLET ORAL 2 TIMES DAILY
Qty: 40 TABLET | Refills: 0 | Status: SHIPPED | OUTPATIENT
Start: 2024-05-29 | End: 2024-06-18

## 2024-05-29 NOTE — CONFIDENTIAL NOTE
Prescriptions sent ,patient  is only cathing once per day. He will try the Oxybutynin.  Yanira Hu LPN

## 2024-06-07 ENCOUNTER — TELEPHONE (OUTPATIENT)
Facility: CLINIC | Age: 89
End: 2024-06-07
Payer: COMMERCIAL

## 2024-06-07 NOTE — TELEPHONE ENCOUNTER
Patient does he think he is voiding as well as he was. I told him he may need to increase how often he caths. He will measure the amount and let us know if he needs more catheters.  Yanira Hu, ROSITA

## 2024-06-19 ENCOUNTER — TELEPHONE (OUTPATIENT)
Dept: INTERNAL MEDICINE | Facility: CLINIC | Age: 89
End: 2024-06-19
Payer: COMMERCIAL

## 2024-06-19 NOTE — TELEPHONE ENCOUNTER
Reason for call:  Patient reporting a symptom    Symptom or request: Digestive bloating    Duration (how long have symptoms been present): 3 days    Have you been treated for this before? No    Additional comments: Patient would like to be seen by any provider this afternoon. Wants sooner than next available at [place] location. Please call.     Phone Number patient can be reached at:  Home number on file [phone] (home)    Best Time:  anytime today    Can we leave a detailed message on this number:  yes    Call taken on 6/19/2024 at 9:41 AM by Kay Taylor

## 2024-06-20 ENCOUNTER — LAB (OUTPATIENT)
Dept: LAB | Facility: CLINIC | Age: 89
End: 2024-06-20
Payer: COMMERCIAL

## 2024-06-20 ENCOUNTER — NURSE TRIAGE (OUTPATIENT)
Dept: INTERNAL MEDICINE | Facility: CLINIC | Age: 89
End: 2024-06-20

## 2024-06-20 DIAGNOSIS — C61 PROSTATE CANCER (H): ICD-10-CM

## 2024-06-20 DIAGNOSIS — R19.5 DARK STOOLS: ICD-10-CM

## 2024-06-20 DIAGNOSIS — R10.10 UPPER ABDOMINAL PAIN: Primary | ICD-10-CM

## 2024-06-20 DIAGNOSIS — R10.10 UPPER ABDOMINAL PAIN: ICD-10-CM

## 2024-06-20 PROCEDURE — 80053 COMPREHEN METABOLIC PANEL: CPT

## 2024-06-20 PROCEDURE — 84153 ASSAY OF PSA TOTAL: CPT

## 2024-06-20 PROCEDURE — 36415 COLL VENOUS BLD VENIPUNCTURE: CPT

## 2024-06-20 NOTE — TELEPHONE ENCOUNTER
Nurse Triage SBAR    Is this a 2nd Level Triage? YES, LICENSED PRACTITIONER REVIEW IS REQUIRED    Situation: Pt calls in with worsening/new symptoms of abdominal pain.    Background: Pt has been experiencing GI issues and abdominal pain and was seen by PCP 5/14, Same day provider 5/21 and ADS 5/21. Currently on Bactrim for UTI.    Assessment: Pt reports this pain is different. Upper abdomen center, dull, moderate, constant, feeling of bloating for about 4 days. Reports a large dark stool yesterday. Pt has done research and feels it could be H pylori or Gastritis.     Protocol Recommended Disposition:   Go to ED Now  Advised if symptoms worsen to go to ED  Recommendation: Pt refuses ED, would like to speak to PCP about it.     Routed to provider    Does the patient meet one of the following criteria for ADS visit consideration? 16+ years old, with an [facility] PCP     TIP  Providers, please consider if this condition is appropriate for management at one of our Acute and Diagnostic Services sites.     If patient is a good candidate, please use dotphrase <dot>triageresponse and select Refer to ADS to document.     Reason for Disposition   Pain lasting > 10 minutes and over 50 years old    Additional Information   Negative: SEVERE difficulty breathing (e.g., struggling for each breath, speaks in single words)   Negative: Shock suspected (e.g., cold/pale/clammy skin, too weak to stand, low BP, rapid pulse)   Negative: Difficult to awaken or acting confused (e.g., disoriented, slurred speech)   Negative: Passed out (i.e., lost consciousness, collapsed and was not responding)   Negative: Visible sweat on face or sweat is dripping down   Negative: Sounds like a life-threatening emergency to the triager   Negative: Followed an abdomen (stomach) injury   Negative: Chest pain   Negative: Abdominal pain and pregnant < 20 weeks   Negative: Abdominal pain and pregnant 20 or more weeks   Negative: Abdomen bloating or swelling are  "main symptoms   Negative: SEVERE abdominal pain (e.g., excruciating)    Answer Assessment - Initial Assessment Questions  1. LOCATION: \"Where does it hurt?\"       Upper abdomen, central   2. RADIATION: \"Does the pain shoot anywhere else?\" (e.g., chest, back)      No  3. ONSET: \"When did the pain begin?\" (e.g., minutes, hours or days ago)       4 days ago  4. SUDDEN: \"Gradual or sudden onset?\"      gradual  5. PATTERN \"Does the pain come and go, or is it constant?\"     - If it comes and goes: \"How long does it last?\" \"Do you have pain now?\"      (Note: Comes and goes means the pain is intermittent. It goes away completely between bouts.)     - If constant: \"Is it getting better, staying the same, or getting worse?\"       (Note: Constant means the pain never goes away completely; most serious pain is constant and gets worse.)       Constant dull pain, bloated feeling   6. SEVERITY: \"How bad is the pain?\"  (e.g., Scale 1-10; mild, moderate, or severe)     - MILD (1-3): Doesn't interfere with normal activities, abdomen soft and not tender to touch..      - MODERATE (4-7): Interferes with normal activities or awakens from sleep, abdomen tender to touch.      - SEVERE (8-10): Excruciating pain, doubled over, unable to do any normal activities.        Moderate   7. RECURRENT SYMPTOM: \"Have you ever had this type of stomach pain before?\" If Yes, ask: \"When was the last time?\" and \"What happened that time?\"       No  8. AGGRAVATING FACTORS: \"Does anything seem to cause this pain?\" (e.g., foods, stress, alcohol)      No  9. CARDIAC SYMPTOMS: \"Do you have any of the following symptoms: chest pain, difficulty breathing, sweating, nausea?\"      No  10. OTHER SYMPTOMS: \"Do you have any other symptoms?\" (e.g., back pain, diarrhea, fever, urination pain, vomiting)        No  11. PREGNANCY: \"Is there any chance you are pregnant?\" \"When was your last menstrual period?\"        N/A    Protocols used: Abdominal Pain - Upper-A-[place]    "

## 2024-06-20 NOTE — TELEPHONE ENCOUNTER
Please advise pt:    Does NOT need to go to ED.     Can't know for certain whether gastritis present without endoscopy (which I don't recommend yet at this time).    Could be GI upset from a medication, such as Bactrim.     I've placed orders for labs to check for H Pylori, also to check CMP/CBC.

## 2024-06-21 ENCOUNTER — LAB (OUTPATIENT)
Dept: LAB | Facility: CLINIC | Age: 89
End: 2024-06-21
Payer: COMMERCIAL

## 2024-06-21 DIAGNOSIS — R19.5 DARK STOOLS: ICD-10-CM

## 2024-06-21 DIAGNOSIS — R10.10 UPPER ABDOMINAL PAIN: ICD-10-CM

## 2024-06-21 LAB
ALBUMIN SERPL BCG-MCNC: 4.1 G/DL (ref 3.5–5.2)
ALP SERPL-CCNC: 47 U/L (ref 40–150)
ALT SERPL W P-5'-P-CCNC: 15 U/L (ref 0–70)
ANION GAP SERPL CALCULATED.3IONS-SCNC: 8 MMOL/L (ref 7–15)
AST SERPL W P-5'-P-CCNC: 22 U/L (ref 0–45)
BILIRUB SERPL-MCNC: 0.6 MG/DL
BUN SERPL-MCNC: 13.5 MG/DL (ref 8–23)
CALCIUM SERPL-MCNC: 9 MG/DL (ref 8.2–9.6)
CHLORIDE SERPL-SCNC: 99 MMOL/L (ref 98–107)
CREAT SERPL-MCNC: 1.14 MG/DL (ref 0.67–1.17)
DEPRECATED HCO3 PLAS-SCNC: 25 MMOL/L (ref 22–29)
EGFRCR SERPLBLD CKD-EPI 2021: 61 ML/MIN/1.73M2
ERYTHROCYTE [DISTWIDTH] IN BLOOD BY AUTOMATED COUNT: 13 % (ref 10–15)
GLUCOSE SERPL-MCNC: 161 MG/DL (ref 70–99)
HCT VFR BLD AUTO: 35.2 % (ref 40–53)
HGB BLD-MCNC: 12.2 G/DL (ref 13.3–17.7)
MCH RBC QN AUTO: 36.6 PG (ref 26.5–33)
MCHC RBC AUTO-ENTMCNC: 34.7 G/DL (ref 31.5–36.5)
MCV RBC AUTO: 106 FL (ref 78–100)
PLATELET # BLD AUTO: 131 10E3/UL (ref 150–450)
POTASSIUM SERPL-SCNC: 4.3 MMOL/L (ref 3.4–5.3)
PROT SERPL-MCNC: 6.7 G/DL (ref 6.4–8.3)
PSA SERPL DL<=0.01 NG/ML-MCNC: 0.02 NG/ML
RBC # BLD AUTO: 3.33 10E6/UL (ref 4.4–5.9)
SODIUM SERPL-SCNC: 132 MMOL/L (ref 135–145)
WBC # BLD AUTO: 4.1 10E3/UL (ref 4–11)

## 2024-06-21 PROCEDURE — 85027 COMPLETE CBC AUTOMATED: CPT

## 2024-06-21 PROCEDURE — 36415 COLL VENOUS BLD VENIPUNCTURE: CPT

## 2024-06-24 PROCEDURE — 87338 HPYLORI STOOL AG IA: CPT

## 2024-06-25 LAB — H PYLORI AG STL QL IA: NEGATIVE

## 2024-06-26 NOTE — TELEPHONE ENCOUNTER
Patient sent [employer] message that H.pylori results were negative, but symptoms are still present. Asking what else could be causing this and what his next step should be.     Payal Salas RN  [facility]

## 2024-06-27 ENCOUNTER — OFFICE VISIT (OUTPATIENT)
Dept: UROLOGY | Facility: CLINIC | Age: 89
End: 2024-06-27
Payer: COMMERCIAL

## 2024-06-27 VITALS
BODY MASS INDEX: 24.39 KG/M2 | SYSTOLIC BLOOD PRESSURE: 116 MMHG | HEIGHT: 73 IN | WEIGHT: 184 LBS | DIASTOLIC BLOOD PRESSURE: 74 MMHG

## 2024-06-27 DIAGNOSIS — R10.2 SUPRAPUBIC PAIN: ICD-10-CM

## 2024-06-27 DIAGNOSIS — R33.9 INCOMPLETE BLADDER EMPTYING: Primary | ICD-10-CM

## 2024-06-27 DIAGNOSIS — C61 PROSTATE CANCER (H): ICD-10-CM

## 2024-06-27 LAB
ALBUMIN UR-MCNC: 30 MG/DL
APPEARANCE UR: CLEAR
BILIRUB UR QL STRIP: NEGATIVE
COLOR UR AUTO: YELLOW
GLUCOSE UR STRIP-MCNC: NEGATIVE MG/DL
HGB UR QL STRIP: NEGATIVE
KETONES UR STRIP-MCNC: ABNORMAL MG/DL
LEUKOCYTE ESTERASE UR QL STRIP: NEGATIVE
NITRATE UR QL: NEGATIVE
PH UR STRIP: 5.5 [PH] (ref 5–7)
RESIDUAL VOLUME (RV) (EXTERNAL): 227
SP GR UR STRIP: >=1.03 (ref 1–1.03)
UROBILINOGEN UR STRIP-ACNC: 0.2 E.U./DL

## 2024-06-27 PROCEDURE — 81003 URINALYSIS AUTO W/O SCOPE: CPT | Mod: QW | Performed by: STUDENT IN AN ORGANIZED HEALTH CARE EDUCATION/TRAINING PROGRAM

## 2024-06-27 PROCEDURE — 51798 US URINE CAPACITY MEASURE: CPT | Performed by: STUDENT IN AN ORGANIZED HEALTH CARE EDUCATION/TRAINING PROGRAM

## 2024-06-27 PROCEDURE — 99213 OFFICE O/P EST LOW 20 MIN: CPT | Mod: 25 | Performed by: STUDENT IN AN ORGANIZED HEALTH CARE EDUCATION/TRAINING PROGRAM

## 2024-06-27 ASSESSMENT — PAIN SCALES - GENERAL: PAINLEVEL: NO PAIN (0)

## 2024-06-27 NOTE — PATIENT INSTRUCTIONS
- continue to cath at least once a day (at night before bedtime), more often if needed  - continue tamsulosin 0.8 mg daily and avodart  - return 1 year with PSA prior, for UA, PVR, AUA symptom score, or earlier if having worsening symptoms

## 2024-06-27 NOTE — LETTER
"6/27/2024       RE: Jorge Salomon  [address] [facility] [address]     Dear Colleague,    Thank you for referring your patient, Jorge Salomon, to the [facility] [place] [facility]. Please see a copy of my visit note below.    CHIEF COMPLAINT   Jorge Salomon who is a 89 year old male returns today for follow-up of [place] 3+3 = 6 prostate cancer s/p radiation 2017, BPH with incomplete bladder emptying on avodart and tamsulosin    HPI   Jorge Salomon is a 89 year old male returns today for follow-up of [place] 3+3 = 6 prostate cancer s/p radiation 2017, BPH with incomplete bladder emptying on avodart and tamsulosin    At last visit was taught how to cath and had been cathing up to 4 times a day    Has since had Citrobacter UTI which needed several courses of abx to resolve. Seen in ER in May and also had candida UTI, given fluconazole and additional bactrim, he is now finishing a ~30 day course. Bactrim seems to have cause him to have GI upset    Had been having some bladder spasm and was trialed on oxybutinin which did not seem to help much    Patient had been catheterizing regularly about about 5 days ago when he stopped on his own. Unsurprisingly has  now had worsening urinary symptoms and overnight had difficulty with urination with nocturia    He is still having issues with shingles in L1/L2 distribution    PHYSICAL EXAM  Patient is a 89 year old  male   Vitals: Blood pressure 116/74, height 1.854 m (6' 1\"), weight 83.5 kg (184 lb).  Body mass index is 24.28 kg/m .  General Appearance Adult:   Alert, no acute distress, oriented  HENT: throat/mouth:normal, good dentition  Lungs: no respiratory distress, or pursed lip breathing  Heart: No obvious jugular venous distension present  Abdomen: nondistended  Musculoskeltal: extremities normal, no peripheral edema  Skin: no suspicious lesions or rashes  Neuro: Alert, " oriented, speech and mentation normal  Psych: affect and mood normal  Gait: Normal  All pertinent imaging reviewed:    Component      Latest Ref Rng 6/20/2024  10:06 AM   PSA Tumor Marker      ng/mL 0.02       ml    Component      Latest Ref Rng 6/27/2024  9:14 AM   Color Urine      Colorless, Straw, Light Yellow, Yellow  Yellow    Appearance Urine      Clear  Clear    Glucose Urine      Negative mg/dL Negative    Bilirubin Urine      Negative  Negative    Ketones Urine      Negative mg/dL Trace !    Specific Gravity Urine      1.003 - 1.035  >=1.030    Blood Urine      Negative  Negative    pH Urine      5.0 - 7.0  5.5    Protein Albumin Urine      Negative mg/dL 30 !    Urobilinogen Urine      0.2, 1.0 E.U./dL 0.2    Nitrite Urine      Negative  Negative    Leukocyte Esterase Urine      Negative  Negative       Legend:  ! Abnormal    ASSESSMENT and PLAN  89 year old male returns today for follow-up of [place] 3+3 = 6 prostate cancer s/p radiation 2017, BPH with incomplete bladder emptying on avodart and tamsulosin    Re: BPH with incomplete bladder emptying, his PVR today was 227 ml which is improved from prior, this is on double dose tamsulosin. Should continue dutasteride and tamsulosin. He can probably reduce his frequency of catheterization given the improving PVR, but definitely continue to cath at night before bedtime. If persistent problems, should return for office cystoscopy    Re: recurrent/persistent UTI, UA does not appear infected, no leukocytes or nitrite    Re: prostate cancer PSA remains very low, near 0    Re: suprapubic pain, oxybutinin was ineffective. I would avoid further anticholinergic at this point as he is trying to void spontaneously    - continue to cath at least once a day (at night before bedtime), more often if needed  - continue tamsulosin 0.8 mg daily and avodart  - return 1 year with PSA prior, for UA, PVR, AUA symptom score, or earlier if having worsening  symptoms        Geremias Plunkett MD   [facility]  [facility] Phone: [phone]

## 2024-06-27 NOTE — PROGRESS NOTES
"CHIEF COMPLAINT   Jorge Salomon who is a 89 year old male returns today for follow-up of [place] 3+3 = 6 prostate cancer s/p radiation 2017, BPH with incomplete bladder emptying on avodart and tamsulosin    HPI   Jorge Salomon is a 89 year old male returns today for follow-up of [place] 3+3 = 6 prostate cancer s/p radiation 2017, BPH with incomplete bladder emptying on avodart and tamsulosin    At last visit was taught how to cath and had been cathing up to 4 times a day    Has since had Citrobacter UTI which needed several courses of abx to resolve. Seen in ER in May and also had candida UTI, given fluconazole and additional bactrim, he is now finishing a ~30 day course. Bactrim seems to have cause him to have GI upset    Had been having some bladder spasm and was trialed on oxybutinin which did not seem to help much    Patient had been catheterizing regularly about about 5 days ago when he stopped on his own. Unsurprisingly has  now had worsening urinary symptoms and overnight had difficulty with urination with nocturia    He is still having issues with shingles in L1/L2 distribution    PHYSICAL EXAM  Patient is a 89 year old  male   Vitals: Blood pressure 116/74, height 1.854 m (6' 1\"), weight 83.5 kg (184 lb).  Body mass index is 24.28 kg/m .  General Appearance Adult:   Alert, no acute distress, oriented  HENT: throat/mouth:normal, good dentition  Lungs: no respiratory distress, or pursed lip breathing  Heart: No obvious jugular venous distension present  Abdomen: nondistended  Musculoskeltal: extremities normal, no peripheral edema  Skin: no suspicious lesions or rashes  Neuro: Alert, oriented, speech and mentation normal  Psych: affect and mood normal  Gait: Normal  All pertinent imaging reviewed:    Component      Latest Ref Rng 6/20/2024  10:06 AM   PSA Tumor Marker      ng/mL 0.02       ml    Component      Latest Ref Rng 6/27/2024  9:14 AM   Color Urine      Colorless, Straw, Light Yellow, " Yellow  Yellow    Appearance Urine      Clear  Clear    Glucose Urine      Negative mg/dL Negative    Bilirubin Urine      Negative  Negative    Ketones Urine      Negative mg/dL Trace !    Specific Gravity Urine      1.003 - 1.035  >=1.030    Blood Urine      Negative  Negative    pH Urine      5.0 - 7.0  5.5    Protein Albumin Urine      Negative mg/dL 30 !    Urobilinogen Urine      0.2, 1.0 E.U./dL 0.2    Nitrite Urine      Negative  Negative    Leukocyte Esterase Urine      Negative  Negative       Legend:  ! Abnormal    ASSESSMENT and PLAN  89 year old male returns today for follow-up of [place] 3+3 = 6 prostate cancer s/p radiation 2017, BPH with incomplete bladder emptying on avodart and tamsulosin    Re: BPH with incomplete bladder emptying, his PVR today was 227 ml which is improved from prior, this is on double dose tamsulosin. Should continue dutasteride and tamsulosin. He can probably reduce his frequency of catheterization given the improving PVR, but definitely continue to cath at night before bedtime. If persistent problems, should return for office cystoscopy    Re: recurrent/persistent UTI, UA does not appear infected, no leukocytes or nitrite    Re: prostate cancer PSA remains very low, near 0    Re: suprapubic pain, oxybutinin was ineffective. I would avoid further anticholinergic at this point as he is trying to void spontaneously    - continue to cath at least once a day (at night before bedtime), more often if needed  - continue tamsulosin 0.8 mg daily and avodart  - return 1 year with PSA prior, for UA, PVR, AUA symptom score, or earlier if having worsening symptoms        Geremias Plunkett MD   [facility]  [facility] Phone: [phone]

## 2024-06-27 NOTE — NURSING NOTE
Chief Complaint   Patient presents with    Benign Prostatic Hypertrophy     Pt here for 1 year follow up      Pt states he has not cathed since Sunday. Pt states he does CIC as needed.  Pt states he was up 5 times over the night last night and suspects he should has done CIC last night.    PVR: 227 mL    Jackelyn Mejia, CMA

## 2024-07-01 DIAGNOSIS — C61 PROSTATE CANCER (H): ICD-10-CM

## 2024-07-01 RX ORDER — DUTASTERIDE 0.5 MG/1
1 CAPSULE, LIQUID FILLED ORAL DAILY
Qty: 90 CAPSULE | Refills: 3 | Status: SHIPPED | OUTPATIENT
Start: 2024-07-01

## 2024-07-12 ENCOUNTER — VIRTUAL VISIT (OUTPATIENT)
Dept: INTERNAL MEDICINE | Facility: CLINIC | Age: 89
End: 2024-07-12
Payer: COMMERCIAL

## 2024-07-12 DIAGNOSIS — B02.29 POST HERPETIC NEURALGIA: Primary | ICD-10-CM

## 2024-07-12 PROCEDURE — 99442 PR PHYSICIAN TELEPHONE EVALUATION 11-20 MIN: CPT | Mod: 93 | Performed by: INTERNAL MEDICINE

## 2024-07-12 RX ORDER — LATANOPROST 50 UG/ML
SOLUTION/ DROPS OPHTHALMIC
COMMUNITY

## 2024-07-12 RX ORDER — GABAPENTIN 100 MG/1
100-300 CAPSULE ORAL 3 TIMES DAILY
Qty: 90 CAPSULE | Refills: 1 | Status: SHIPPED | OUTPATIENT
Start: 2024-07-12 | End: 2024-08-01

## 2024-07-12 NOTE — PATIENT INSTRUCTIONS
Try taking gabapentin 100 mg, up to three times a day.   May increase dose to as high as three tabs at a time if symptoms not relieved and if not excessively sedated.    Set up a telephone or video visit in a few weeks if not improving.    Patient is a 89y old  Female who presents with a chief complaint of Weakness. (17 Dec 2019 20:52)      HPI:  89 y/o Female PMH of HTN, DM2, HL, seizure d/o, gout presents to ED for eval of increasing weakness and change in mental status over the past few weeks.  Family states some days pt is at her baseline, then other times she is more lethargic, noted drooling.  Family states pt taken to outside hospital twice for sx, work up neg & dc home.  Family states pt has been compliant w her meds, no known grand mal seizures.  No fall/head trauma.  Pt currently denies pain, denies all complaints; she is poor historian, on memantine, possible dementia. (10 Dec 2019 06:42)      INTERVAL HPI/OVERNIGHT EVENTS:  Patient seen earlier today  The patient denies melena, hematochezia, hematemesis, nausea, vomiting, abdominal pain, constipation, diarrhea, or change in bowel movements     MEDICATIONS  (STANDING):  atorvastatin 40 milliGRAM(s) Oral at bedtime  dextrose 5%. 1000 milliLiter(s) (50 mL/Hr) IV Continuous <Continuous>  dextrose 50% Injectable 12.5 Gram(s) IV Push once  dextrose 50% Injectable 25 Gram(s) IV Push once  dextrose 50% Injectable 25 Gram(s) IV Push once  heparin  Injectable 5000 Unit(s) SubCutaneous every 12 hours  insulin lispro (HumaLOG) corrective regimen sliding scale   SubCutaneous three times a day before meals  levETIRAcetam  IVPB 500 milliGRAM(s) IV Intermittent every 12 hours  piperacillin/tazobactam IVPB.. 3.375 Gram(s) IV Intermittent every 8 hours    MEDICATIONS  (PRN):  acetaminophen  Suppository .. 650 milliGRAM(s) Rectal every 6 hours PRN Temp greater or equal to 38C (100.4F), Mild Pain (1 - 3)  albuterol/ipratropium for Nebulization. 3 milliLiter(s) Nebulizer every 6 hours PRN Shortness of Breath and/or Wheezing  dextrose 40% Gel 15 Gram(s) Oral once PRN Blood Glucose LESS THAN 70 milliGRAM(s)/deciliter  glucagon  Injectable 1 milliGRAM(s) IntraMuscular once PRN Glucose LESS THAN 70 milligrams/deciliter      FAMILY HISTORY:  No pertinent family history in first degree relatives      Allergies    No Known Allergies    Intolerances        PMH/PSH:  DM (diabetes mellitus)  Hyperlipidemia  Gout  HTN (hypertension)  CVA (cerebral vascular accident)  No significant past surgical history        REVIEW OF SYSTEMS:  CONSTITUTIONAL: No fever, weight loss, or fatigue  EYES: No eye pain, visual disturbances, or discharge  ENMT:  No difficulty hearing, tinnitus, vertigo; No sinus or throat pain  NECK: No pain or stiffness  BREASTS: No pain, masses, or nipple discharge  RESPIRATORY: No cough, wheezing, chills or hemoptysis; No shortness of breath  CARDIOVASCULAR: No chest pain, palpitations, dizziness, or leg swelling  GASTROINTESTINAL:  See above  GENITOURINARY: No dysuria, frequency, hematuria, or incontinence  NEUROLOGICAL: No headaches, memory loss, loss of strength, numbness, or tremors  SKIN: No itching, burning, rashes, or lesions   LYMPH NODES: No enlarged glands  ENDOCRINE: No heat or cold intolerance; No hair loss  MUSCULOSKELETAL: No joint pain or swelling; No muscle, back, or extremity pain  PSYCHIATRIC: No depression, anxiety, mood swings, or difficulty sleeping  HEME/LYMPH: No easy bruising, or bleeding gums  ALLERGY AND IMMUNOLOGIC: No hives or eczema    Vital Signs Last 24 Hrs  T(C): 37.8 (17 Dec 2019 17:45), Max: 38.6 (16 Dec 2019 21:41)  T(F): 100 (17 Dec 2019 17:45), Max: 101.4 (16 Dec 2019 21:41)  HR: 98 (17 Dec 2019 17:45) (87 - 121)  BP: 126/66 (17 Dec 2019 17:45) (109/62 - 135/68)  BP(mean): --  RR: 17 (17 Dec 2019 17:45) (16 - 19)  SpO2: 97% (17 Dec 2019 17:45) (96% - 100%)    PHYSICAL EXAM:  GENERAL: NAD, well-groomed, well-developed  HEAD:  Atraumatic, Normocephalic  EYES: EOMI, PERRLA, conjunctiva and sclera clear  NECK: Supple, No JVD, Normal thyroid  NERVOUS SYSTEM:  Alert & @ baseline  CHEST/LUNG: Clear to percussion bilaterally; No rales, rhonchi, wheezing, or rubs  HEART: Regular rate and rhythm; No murmurs, rubs, or gallops  ABDOMEN: Soft, Nontender, Nondistended; Bowel sounds present  EXTREMITIES:  2+ Peripheral Pulses, No clubbing, cyanosis, or edema  LYMPH: No lymphadenopathy noted  SKIN: No rashes or lesions    LAB                          8.2    6.91  )-----------( 132      ( 17 Dec 2019 07:32 )             27.1       CBC:  12-17 @ 07:32  WBC 6.91   Hgb 8.2   Hct 27.1   Plts 132  MCV 80.4  12-16 @ 06:14  WBC 6.89   Hgb 7.3   Hct 24.3   Plts 133  MCV 80.5  12-15 @ 12:32  WBC 7.61   Hgb 7.5   Hct 24.7   Plts 143  MCV 82.3  12-14 @ 07:40  WBC 7.67   Hgb 7.2   Hct 24.1   Plts 141  MCV 81.1  12-12 @ 07:50  WBC 13.68   Hgb 8.6   Hct 28.1   Plts 166  MCV 82.2  12-11 @ 10:42  WBC 18.28   Hgb 9.1   Hct 28.8   Plts 230  MCV 78.0  12-11 @ 07:11  WBC 17.92   Hgb 9.7   Hct 30.9   Plts 247  MCV 79.2      Chemistry:  12-17 @ 07:32  Na+ 142  K+ 4.0  Cl- 111  CO2 24  BUN 29  Cr 1.41     12-16 @ 06:14  Na+ 145  K+ 4.0  Cl- 116  CO2 22  BUN 30  Cr 1.32     12-15 @ 12:32  Na+ 147  K+ 3.9  Cl- 117  CO2 22  BUN 33  Cr 1.42     12-14 @ 07:40  Na+ 148  K+ 3.9  Cl- 120  CO2 22  BUN 43  Cr 1.51     12-13 @ 07:18  Na+ 145  K+ 3.9  Cl- 119  CO2 20  BUN 45  Cr 1.71     12-12 @ 07:50  Na+ 144  K+ 4.0  Cl- 116  CO2 19  BUN 44  Cr 2.02     12-11 @ 10:42  Na+ 141  K+ 4.5  Cl- 110  CO2 17  BUN 55  Cr 3.52     12-11 @ 07:11  Na+ 139  K+ 4.8  Cl- 107  CO2 18  BUN 47  Cr 3.01         Glucose, Serum: 157 mg/dL (12-17 @ 07:32)  Glucose, Serum: 143 mg/dL (12-16 @ 06:14)  Glucose, Serum: 133 mg/dL (12-15 @ 12:32)  Glucose, Serum: 137 mg/dL (12-14 @ 07:40)  Glucose, Serum: 159 mg/dL (12-13 @ 07:18)  Glucose, Serum: 195 mg/dL (12-12 @ 07:50)  Glucose, Serum: 409 mg/dL (12-11 @ 10:42)  Glucose, Serum: 344 mg/dL (12-11 @ 07:11)      17 Dec 2019 07:32    142    |  111    |  29     ----------------------------<  157    4.0     |  24     |  1.41   16 Dec 2019 06:14    145    |  116    |  30     ----------------------------<  143    4.0     |  22     |  1.32   15 Dec 2019 12:32    147    |  117    |  33     ----------------------------<  133    3.9     |  22     |  1.42   14 Dec 2019 07:40    148    |  120    |  43     ----------------------------<  137    3.9     |  22     |  1.51   13 Dec 2019 07:18    145    |  119    |  45     ----------------------------<  159    3.9     |  20     |  1.71   12 Dec 2019 07:50    144    |  116    |  44     ----------------------------<  195    4.0     |  19     |  2.02   11 Dec 2019 10:42    141    |  110    |  55     ----------------------------<  409    4.5     |  17     |  3.52     Ca    8.9        17 Dec 2019 07:32  Ca    8.9        16 Dec 2019 06:14  Ca    9.3        15 Dec 2019 12:32  Ca    9.7        14 Dec 2019 07:40  Ca    10.0       13 Dec 2019 07:18  Ca    10.6       12 Dec 2019 07:50  Ca    10.8       11 Dec 2019 10:42  Phos  3.6       17 Dec 2019 07:32  Phos  3.3       16 Dec 2019 06:14  Phos  3.3       12 Dec 2019 07:50  Phos  5.3       11 Dec 2019 07:11  Mg     1.8       17 Dec 2019 07:32  Mg     1.8       16 Dec 2019 06:14  Mg     1.7       12 Dec 2019 07:50  Mg     1.8       11 Dec 2019 07:11    TPro  5.5    /  Alb  2.7    /  TBili  x      /  DBili  x      /  AST  x      /  ALT  x      /  AlkPhos  x      12 Dec 2019 11:07  TPro  6.9    /  Alb  3.1    /  TBili  x      /  DBili  x      /  AST  x      /  ALT  x      /  AlkPhos  x      11 Dec 2019 13:37              CAPILLARY BLOOD GLUCOSE      POCT Blood Glucose.: 196 mg/dL (17 Dec 2019 17:46)  POCT Blood Glucose.: 170 mg/dL (17 Dec 2019 11:56)  POCT Blood Glucose.: 162 mg/dL (17 Dec 2019 08:12)  POCT Blood Glucose.: 176 mg/dL (16 Dec 2019 22:37)          RADIOLOGY & ADDITIONAL TESTS:    Imaging Personally Reviewed:  [ ] YES  [ ] NO    Consultant(s) Notes Reviewed:  [ ] YES  [ ] NO    Care Discussed with Consultants/Other Providers [ ] YES  [ ] NO

## 2024-07-12 NOTE — PROGRESS NOTES
"Telephone visit: 20 minute conversation.  ([id number])      Jorge is a 89 year old who is being evaluated via a billable telephone visit.    What phone number would you like to be contacted at? [phone]  How would you like to obtain your AVS? [email or web address]  Originating Location (pt. Location): Home    Distant Location (provider location):  On-site    Assessment & Plan   (B02.29) Post herpetic neuralgia  (primary encounter diagnosis)  Comment: See discussion, patient instructions and Epic orders.   Plan: gabapentin (NEURONTIN) 100 MG capsule              Patient Instructions   Try taking gabapentin 100 mg, up to three times a day.   May increase dose to as high as three tabs at a time if symptoms not relieved and if not excessively sedated.    Set up a telephone or video visit in a few weeks if not improving.     Subjective   Jorge is a 89 year old, presenting for the following health issues:  Follow Up (Post herpetic , spasms in bladder)      7/12/2024    12:55 PM   Additional Questions   Roomed by Leah MG CMA     History of Present Illness       Reason for visit:  Follow up    He eats 4 or more servings of fruits and vegetables daily.He consumes 0 sweetened beverage(s) daily.He exercises with enough effort to increase his heart rate 30 to 60 minutes per day.  He exercises with enough effort to increase his heart rate 3 or less days per week.   He is taking medications regularly.       Patient reporting \"spasms\" of pain in the right groin that \"come and go\" and seem to occur every few seconds. They are less sharp and severe as the pain with shingles, but in the same general location. The area is slightly numb, but also sensitive to the touch of clothing. Symptoms better when lying on left side than on back. More bothersome during the day than at night.     Symptoms sound  more like postherpetic neuralgia than anything to do with the bladder now--Urologist agreed with patient.   Dysuria has resolved. Patient " straight-cathing once nightly before bed, volumes initially around 450 ml, more recently about 100 ml.     We agreed to avoid anticholinergic meds--previous trial of oxybutynin for bladder spasms didn't help those symptoms and Urology suggested to avoid meds in this class.     We agreed to try gabapentin. Cautioned patient about possible side effects of sedation.       Past medical, family and social histories as well as medications reviewed and updated as needed.    REVIEW OF SYSTEMS: The following systems have been completely reviewed and are negative except as noted above:   Constitutional, gastrointestinal, genitourinary, musculoskeletal, dermatologic, and neurologic systems.        Objective    Vitals - Patient Reported  Weight (Patient Reported): 83.5 kg (184 lb)        Physical Exam   General: Alert and no distress //Respiratory: No audible wheeze, cough, or shortness of breath // Psychiatric:  Appropriate affect, tone, and pace of words          Phone call duration: 20 minutes  Signed Electronically by: Srinivasa Tello MD,

## 2024-07-23 ENCOUNTER — TRANSFERRED RECORDS (OUTPATIENT)
Dept: HEALTH INFORMATION MANAGEMENT | Facility: CLINIC | Age: 89
End: 2024-07-23
Payer: COMMERCIAL

## 2024-07-28 ENCOUNTER — MYC MEDICAL ADVICE (OUTPATIENT)
Dept: INTERNAL MEDICINE | Facility: CLINIC | Age: 89
End: 2024-07-28
Payer: COMMERCIAL

## 2024-07-28 DIAGNOSIS — B02.29 POST HERPETIC NEURALGIA: ICD-10-CM

## 2024-07-29 NOTE — TELEPHONE ENCOUNTER
Please review ePub Direct message and advise.   Thank you.    Patient started Gabapentin 100 mg, to take 1-3 capsules for post herpetic neuralgia on 07/12/24.

## 2024-08-01 RX ORDER — GABAPENTIN 300 MG/1
300 CAPSULE ORAL 3 TIMES DAILY
Qty: 270 CAPSULE | Refills: 0 | Status: SHIPPED | OUTPATIENT
Start: 2024-08-01

## 2024-08-01 RX ORDER — GABAPENTIN 100 MG/1
100 CAPSULE ORAL 3 TIMES DAILY
Qty: 270 CAPSULE | Refills: 0 | Status: SHIPPED | OUTPATIENT
Start: 2024-08-01

## 2024-08-01 NOTE — TELEPHONE ENCOUNTER
Patient calls to follow up about this. Patient is frustrated that he hasn't heard a response back yet. Patient is asking primary care provider to review his message and address his concerns today.     Patient reports he is running out of Gabapentin.     Printed out Epic [employer]ground message to have provider review. Placed encounter outside provider office.     Thank you,  Rell Michaud, Triage RN Radha Harwood  12:54 PM 8/1/2024

## 2024-09-10 ENCOUNTER — HOSPITAL ENCOUNTER (OUTPATIENT)
Dept: ULTRASOUND IMAGING | Facility: CLINIC | Age: 89
Discharge: HOME | End: 2024-09-10
Attending: SURGERY | Admitting: SURGERY
Payer: COMMERCIAL

## 2024-09-10 DIAGNOSIS — I71.40 ABDOMINAL AORTIC ANEURYSM (AAA) WITHOUT RUPTURE (H): ICD-10-CM

## 2024-09-10 DIAGNOSIS — R09.89 PROMINENT POPLITEAL PULSE: ICD-10-CM

## 2024-09-10 PROCEDURE — 93978 VASCULAR STUDY: CPT

## 2024-09-12 ENCOUNTER — TELEPHONE (OUTPATIENT)
Dept: OTHER | Facility: CLINIC | Age: 89
End: 2024-09-12
Payer: COMMERCIAL

## 2024-09-12 NOTE — TELEPHONE ENCOUNTER
I spoke with the ultrasound tech who performed Jorge's ultrasound on 09/10/24.  She stated that Jorge declined to have the ultrasound done on his legs.  I relayed that information to Dr. Brown's nurse who will update the appt notes.

## 2024-09-13 ENCOUNTER — TELEPHONE (OUTPATIENT)
Dept: OTHER | Facility: CLINIC | Age: 89
End: 2024-09-13

## 2024-09-13 ENCOUNTER — OFFICE VISIT (OUTPATIENT)
Dept: OTHER | Facility: CLINIC | Age: 89
End: 2024-09-13
Attending: SURGERY
Payer: COMMERCIAL

## 2024-09-13 VITALS — SYSTOLIC BLOOD PRESSURE: 118 MMHG | DIASTOLIC BLOOD PRESSURE: 74 MMHG | HEART RATE: 87 BPM

## 2024-09-13 DIAGNOSIS — R06.09 DYSPNEA ON EXERTION: ICD-10-CM

## 2024-09-13 DIAGNOSIS — B02.29 POSTHERPETIC NEURALGIA: ICD-10-CM

## 2024-09-13 DIAGNOSIS — I71.9 PENETRATING ATHEROSCLEROTIC ULCER OF AORTA (H): Primary | ICD-10-CM

## 2024-09-13 DIAGNOSIS — I72.4 ANEURYSM OF LEFT POPLITEAL ARTERY (H): ICD-10-CM

## 2024-09-13 PROCEDURE — G0463 HOSPITAL OUTPT CLINIC VISIT: HCPCS | Performed by: SURGERY

## 2024-09-13 PROCEDURE — 99204 OFFICE O/P NEW MOD 45 MIN: CPT | Performed by: SURGERY

## 2024-09-13 NOTE — PROGRESS NOTES
Jorge Salomon is a 89-year-old gentleman followed for the past few years for a small AAA.  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  He returns today for 3-year follow-up with a AAA ultrasound and bilateral popliteal artery ultrasounds.  He is accompanied by his wife.  Several months ago he had an episode of shingles and still suffers from neuralgia in the dermatome of his right groin.  He is on gabapentin for this.  He is otherwise in good spirits and had no complaints.    Exam:  Well-developed male alert and oriented x 3.  Blood pressure 118/74 with pulse of 87.    I did not check pedal pulses today.    Imaging:  AAA ultrasound shows maximal diameter of 3.5 cm.    I reviewed a CT scan of the abdomen performed on 5/21/2024 for lower abdominal pain.  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  This saccular outpouching has a depth of 22 mm and a width of approximately 20 mm.  Aortic diameter at the level of the saccular outpouching is 3.7 cm.    The patient declined his popliteal artery ultrasounds thinking that they had just been performed last year and were therefore unnecessary.    ASSESSMENT:  1.  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  Aortic diameter at this level is 3.7 cm.    2.  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    3.  Recent episode of shingles with postherpetic neuralgia causing pain in the right groin.    4.  Dyspnea on exertion.    RECOMMENDATION:  I reviewed all the above with devonte and his wife.  The recent CT scan shows that he does not have a small fusiform AAA but rather a penetrating aortic ulcer with a posterior pseudoaneurysm.  With depth of 22 mm and width of 20 mm I believe repair should be considered.  I will review his CT  scan with my [employer] representative.  I will contact him in 1 week for a telephone visit to discuss treatment recommendations.    All of his questions were answered and he verbalizes full understanding to the above and agreement with this management plan.    Total length of this encounter was 40 minutes with time spent reviewing studies, interviewing and examining the patient, answering questions, and coordinating a treatment plan.    Cory Brown MD

## 2024-09-13 NOTE — PROGRESS NOTES
[facility]        Patient is here for a follow up.    Pt is currently taking no meds that would impact our treatment plan.    /74 (BP Location: Left arm, Patient Position: Sitting, Cuff Size: Adult Regular)   Pulse 87     The provider has been notified that the patient has no concerns.     Questions patient would like addressed today are: N/A.    Refills are needed: N/A    Has homecare services and agency name:  Nusrat Gilliland MA

## 2024-09-13 NOTE — TELEPHONE ENCOUNTER
Per 9/13/24 visit with Dr. Brown, pt needs the following:    Phone appt with Dr. Brown   Please schedule this in 1 week    Dr. Brown has availability on 9/17/24.  Please offer this date.    Routing to scheduling to contact patient to coordinate above.    Appt note in follow up order comments.    RACHNA JackmanN, RN, [facility]

## 2024-09-16 ENCOUNTER — TELEPHONE (OUTPATIENT)
Dept: INTERNAL MEDICINE | Facility: CLINIC | Age: 89
End: 2024-09-16
Payer: COMMERCIAL

## 2024-09-16 NOTE — TELEPHONE ENCOUNTER
General Call    Contacts       Contact Date/Time Type Contact Phone/Fax    09/16/2024 01:38 PM CDT Phone (Incoming) Jorge Salomon (Self) [phone] (H)          Reason for Call:  To ask PCP if pt. Is a candidate for aortic surgery     What are your questions or concerns:  To Ask if he is candidate for Aortic Surgery     Date of last appointment with provider: 04/12/2024    Could we send this information to you in [facility] or would you prefer to receive a phone call?:   Patient would prefer a phone call , and my chart   Okay to leave a detailed message?: Yes at Other phone number:  [phone]

## 2024-09-17 ENCOUNTER — VIRTUAL VISIT (OUTPATIENT)
Dept: OTHER | Facility: CLINIC | Age: 89
End: 2024-09-17
Attending: SURGERY
Payer: COMMERCIAL

## 2024-09-17 DIAGNOSIS — I71.9 PENETRATING ATHEROSCLEROTIC ULCER OF AORTA (H): ICD-10-CM

## 2024-09-17 DIAGNOSIS — R06.09 DYSPNEA ON EXERTION: ICD-10-CM

## 2024-09-17 DIAGNOSIS — B02.29 POSTHERPETIC NEURALGIA: Primary | ICD-10-CM

## 2024-09-17 PROCEDURE — 99442 PR PHYSICIAN TELEPHONE EVALUATION 11-20 MIN: CPT | Mod: 93 | Performed by: SURGERY

## 2024-09-17 NOTE — PROGRESS NOTES
Jorge is a 89 year old who is being evaluated via a billable telephone visit.    What phone number would you like to be contacted at? [phone]  How would you like to obtain your AVS? MyChart  Originating Location (pt. Location): Home    Distant Location (provider location):  On-site    Olena Gilliland MA

## 2024-09-17 NOTE — TELEPHONE ENCOUNTER
Read MD response to patient twice and also sent a copy of the response in a My Chart message to him.  He states he has a video visit with Dr. Brown this afternoon.  ALEIDA Camarillo R.N.

## 2024-09-17 NOTE — TELEPHONE ENCOUNTER
RN: please advise patient.    Dr Tello has reviewed his recent consultation with Dr Brown.   Ordinarily we would be very reluctant to pursue such a surgery at his age. However, it appears that Dr Brown is concerned that NOT repairing this aortic ulcer carries a substantial risk as well.     I know Dr Brown well and trust his judgement. If he believes that surgery is needed, I would give that very strong weight in my decision-making.   His main operative risk is his advanced age, more than any other specific health conditions.     Dr Littlejohn from cardiology found his heart to be doing fine at his 2/13/2024.   If the patient would like further input from him on discussing benefits and risks of the aortic surgery, he might consider scheduling an office consult with him.

## 2024-09-17 NOTE — PROGRESS NOTES
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  Please see my note from his 9/13/2020 for office visit.  He had a CT scan from 5/21/2024 which demonstrated the saccular nature of this penetrating ulcer.  I have reviewed his films with my [employer] representative.  I am conducting a telephone interview with him today from the [facility] to discuss my treatment recommendations for his penetrating ulcer.  He does have a recent history of shingles with postherpetic neuralgia causing pain in his right groin.    At today's visit Jorge was in good spirits and asked appropriate questions.  He is still having a fair amount of right groin discomfort from his postherpetic neuralgia.  This has been unchanged now for the past few months.  There has been no other change in his health history.    I have reviewed his CT scan with my [employer] representative and we have determined that he is an EVAR candidate using a 28 mm bifurcated device to exclude his CHEYENNE.  I reviewed all this with Emerson and again discussed the specifics of the procedure.  He has mild to moderate calcification of his right femoral artery.  This would need to be evaluated at the time of surgery but he could potentially require open femoral cutdown rather than a percutaneous approach at least on the right side.    IMPRESSION:  Somewhat frail 89-year-old gentleman with multiple medical issues and a penetrating aortic ulcer with saccular aneurysm (roughly 20 mm in width and 20 mm in depth with maximal aortic diameter at this level of 3.5 cm) of the posterior distal abdominal aorta just above the bifurcation.  This penetrating aortic ulcer with saccular aneurysm has only increased in diameter by 3 mm over the past 3 years.  He currently is fairly debilitated by postherpetic neuralgia affecting the dermatome of his right groin.    PLAN:  I reviewed all  the above in great detail with can.  He understands that I believe his penetrating aortic ulcer is large enough to consider repair using an endograft.  He is somewhat reluctant to consider surgery due to his dyspnea on exertion and his post herpetic neuralgia.  He is asking to delay repair by a minimum of 4 months hoping that his right groin discomfort will dissipate.  I think this is a reasonable request.  He does understand the potential for aortic rupture and that it is very difficult to predict the risk of that event.  He understands that I will be leaving our arterial practice at the end of 2024.  He will need to be reassigned to a new surgeon.  I will arrange for follow-up with a new surgeon including a pre-clinic CTA of the abdomen and pelvis.  He would prefer this imaging to be performed in [place].  Follow-up will likely be arranged in Dr. Sims's [place] clinic.    All of his questions were answered and he verbalizes full understanding to the above and complete agreement with this management plan.    Total length of this telephone visit performed by me at the [facility] was 16 minutes.    Cory Brown MD

## 2024-09-24 ENCOUNTER — MYC MEDICAL ADVICE (OUTPATIENT)
Dept: CARDIOLOGY | Facility: CLINIC | Age: 89
End: 2024-09-24
Payer: COMMERCIAL

## 2024-09-24 DIAGNOSIS — I50.32 CHRONIC DIASTOLIC HEART FAILURE (H): Primary | ICD-10-CM

## 2024-09-24 RX ORDER — FUROSEMIDE 20 MG
20 TABLET ORAL DAILY
Qty: 90 TABLET | Refills: 2 | Status: SHIPPED | OUTPATIENT
Start: 2024-09-24

## 2024-10-28 SDOH — HEALTH STABILITY: PHYSICAL HEALTH: ON AVERAGE, HOW MANY DAYS PER WEEK DO YOU ENGAGE IN MODERATE TO STRENUOUS EXERCISE (LIKE A BRISK WALK)?: 2 DAYS

## 2024-10-28 SDOH — HEALTH STABILITY: PHYSICAL HEALTH: ON AVERAGE, HOW MANY MINUTES DO YOU ENGAGE IN EXERCISE AT THIS LEVEL?: 30 MIN

## 2024-10-28 ASSESSMENT — SOCIAL DETERMINANTS OF HEALTH (SDOH): HOW OFTEN DO YOU GET TOGETHER WITH FRIENDS OR RELATIVES?: MORE THAN THREE TIMES A WEEK

## 2024-10-29 ENCOUNTER — OFFICE VISIT (OUTPATIENT)
Dept: INTERNAL MEDICINE | Facility: CLINIC | Age: 89
End: 2024-10-29
Payer: COMMERCIAL

## 2024-10-29 VITALS
DIASTOLIC BLOOD PRESSURE: 64 MMHG | HEART RATE: 76 BPM | OXYGEN SATURATION: 97 % | WEIGHT: 196 LBS | SYSTOLIC BLOOD PRESSURE: 118 MMHG | HEIGHT: 72 IN | BODY MASS INDEX: 26.55 KG/M2 | RESPIRATION RATE: 17 BRPM | TEMPERATURE: 97 F

## 2024-10-29 DIAGNOSIS — B02.29 POST HERPETIC NEURALGIA: ICD-10-CM

## 2024-10-29 DIAGNOSIS — M06.09 RHEUMATOID ARTHRITIS, SERONEGATIVE, MULTIPLE SITES (H): ICD-10-CM

## 2024-10-29 DIAGNOSIS — I25.10 CORONARY ARTERY CALCIFICATION SEEN ON CAT SCAN: ICD-10-CM

## 2024-10-29 DIAGNOSIS — R73.09 ELEVATED GLUCOSE: ICD-10-CM

## 2024-10-29 DIAGNOSIS — E78.5 HYPERLIPIDEMIA LDL GOAL <100: ICD-10-CM

## 2024-10-29 DIAGNOSIS — Z00.00 ENCOUNTER FOR MEDICARE ANNUAL WELLNESS EXAM: Primary | ICD-10-CM

## 2024-10-29 DIAGNOSIS — I71.40 ABDOMINAL AORTIC ANEURYSM (AAA) WITHOUT RUPTURE, UNSPECIFIED PART (H): ICD-10-CM

## 2024-10-29 DIAGNOSIS — E87.1 HYPONATREMIA: ICD-10-CM

## 2024-10-29 DIAGNOSIS — I50.32 CHRONIC DIASTOLIC HEART FAILURE (H): ICD-10-CM

## 2024-10-29 LAB
EST. AVERAGE GLUCOSE BLD GHB EST-MCNC: 103 MG/DL
HBA1C MFR BLD: 5.2 % (ref 0–5.6)

## 2024-10-29 PROCEDURE — 90662 IIV NO PRSV INCREASED AG IM: CPT | Mod: GZ | Performed by: INTERNAL MEDICINE

## 2024-10-29 PROCEDURE — 80053 COMPREHEN METABOLIC PANEL: CPT | Performed by: INTERNAL MEDICINE

## 2024-10-29 PROCEDURE — 99214 OFFICE O/P EST MOD 30 MIN: CPT | Mod: 25 | Performed by: INTERNAL MEDICINE

## 2024-10-29 PROCEDURE — 80061 LIPID PANEL: CPT | Performed by: INTERNAL MEDICINE

## 2024-10-29 PROCEDURE — 36415 COLL VENOUS BLD VENIPUNCTURE: CPT | Performed by: INTERNAL MEDICINE

## 2024-10-29 PROCEDURE — 83036 HEMOGLOBIN GLYCOSYLATED A1C: CPT | Performed by: INTERNAL MEDICINE

## 2024-10-29 PROCEDURE — 91320 SARSCV2 VAC 30MCG TRS-SUC IM: CPT | Performed by: INTERNAL MEDICINE

## 2024-10-29 PROCEDURE — 90480 ADMN SARSCOV2 VAC 1/ONLY CMP: CPT | Performed by: INTERNAL MEDICINE

## 2024-10-29 PROCEDURE — G0008 ADMIN INFLUENZA VIRUS VAC: HCPCS | Performed by: INTERNAL MEDICINE

## 2024-10-29 PROCEDURE — G0439 PPPS, SUBSEQ VISIT: HCPCS | Performed by: INTERNAL MEDICINE

## 2024-10-29 RX ORDER — GABAPENTIN 300 MG/1
300 CAPSULE ORAL 3 TIMES DAILY
Qty: 270 CAPSULE | Refills: 3 | Status: SHIPPED | OUTPATIENT
Start: 2024-10-29

## 2024-10-29 RX ORDER — GABAPENTIN 100 MG/1
100 CAPSULE ORAL 3 TIMES DAILY
Qty: 270 CAPSULE | Refills: 3 | Status: SHIPPED | OUTPATIENT
Start: 2024-10-29

## 2024-10-29 NOTE — PATIENT INSTRUCTIONS
Patient Education   Preventive Care Advice   This is general advice given by our system to help you stay healthy. However, your care team may have specific advice just for you. Please talk to your care team about your preventive care needs.  Nutrition  Eat 5 or more servings of fruits and vegetables each day.  Try wheat bread, brown rice and whole grain pasta (instead of white bread, rice, and pasta).  Get enough calcium and vitamin D. Check the label on foods and aim for 100% of the RDA (recommended daily allowance).  Lifestyle  Exercise at least 150 minutes each week  (30 minutes a day, 5 days a week).  Do muscle strengthening activities 2 days a week. These help control your weight and prevent disease.  No smoking.  Wear sunscreen to prevent skin cancer.  Have a dental exam and cleaning every 6 months.  Yearly exams  See your health care team every year to talk about:  Any changes in your health.  Any medicines your care team has prescribed.  Preventive care, family planning, and ways to prevent chronic diseases.  Shots (vaccines)   HPV shots (up to age 26), if you've never had them before.  Hepatitis B shots (up to age 59), if you've never had them before.  COVID-19 shot: Get this shot when it's due.  Flu shot: Get a flu shot every year.  Tetanus shot: Get a tetanus shot every 10 years.  Pneumococcal, hepatitis A, and RSV shots: Ask your care team if you need these based on your risk.  Shingles shot (for age 50 and up)  General health tests  Diabetes screening:  Starting at age 35, Get screened for diabetes at least every 3 years.  If you are younger than age 35, ask your care team if you should be screened for diabetes.  Cholesterol test: At age 39, start having a cholesterol test every 5 years, or more often if advised.  Bone density scan (DEXA): At age 50, ask your care team if you should have this scan for osteoporosis (brittle bones).  Hepatitis C: Get tested at least once in your life.  STIs (sexually  transmitted infections)  Before age 24: Ask your care team if you should be screened for STIs.  After age 24: Get screened for STIs if you're at risk. You are at risk for STIs (including HIV) if:  You are sexually active with more than one person.  You don't use condoms every time.  You or a partner was diagnosed with a sexually transmitted infection.  If you are at risk for HIV, ask about PrEP medicine to prevent HIV.  Get tested for HIV at least once in your life, whether you are at risk for HIV or not.  Cancer screening tests  Cervical cancer screening: If you have a cervix, begin getting regular cervical cancer screening tests starting at age 21.  Breast cancer scan (mammogram): If you've ever had breasts, begin having regular mammograms starting at age 40. This is a scan to check for breast cancer.  Colon cancer screening: It is important to start screening for colon cancer at age 45.  Have a colonoscopy test every 10 years (or more often if you're at risk) Or, ask your provider about stool tests like a FIT test every year or Cologuard test every 3 years.  To learn more about your testing options, visit:   .  For help making a decision, visit:   https://bit.ly/ae77272.  Prostate cancer screening test: If you have a prostate, ask your care team if a prostate cancer screening test (PSA) at age 55 is right for you.  Lung cancer screening: If you are a current or former smoker ages 50 to 80, ask your care team if ongoing lung cancer screenings are right for you.  For informational purposes only. Not to replace the advice of your health care provider. Copyright   2023 [facility]. All rights reserved. Clinically reviewed by the [facility] Transitions Program. [facility] [id number] - REV 01/24.  Preventing Falls: Care Instructions  Injuries and health problems such as trouble walking or poor eyesight can increase your risk of falling. So can some medicines. But there are things you can do to help  "prevent falls. You can exercise to get stronger. You can also arrange your home to make it safer.    Talk to your doctor about the medicines you take. Ask if any of them increase the risk of falls and whether they can be changed or stopped.   Try to exercise regularly. It can help improve your strength and balance. This can help lower your risk of falling.         Practice fall safety and prevention.   Wear low-heeled shoes that fit well and give your feet good support. Talk to your doctor if you have foot problems that make this hard.  Carry a cellphone or wear a medical alert device that you can use to call for help.  Use stepladders instead of chairs to reach high objects. Don't climb if you're at risk for falls. Ask for help, if needed.  Wear the correct eyeglasses, if you need them.        Make your home safer.   Remove rugs, cords, clutter, and furniture from walkways.  Keep your house well lit. Use night-lights in hallways and bathrooms.  Install and use sturdy handrails on stairways.  Wear nonskid footwear, even inside. Don't walk barefoot or in socks without shoes.        Be safe outside.   Use handrails, curb cuts, and ramps whenever possible.  Keep your hands free by using a shoulder bag or backpack.  Try to walk in well-lit areas. Watch out for uneven ground, changes in pavement, and debris.  Be careful in the winter. Walk on the grass or gravel when sidewalks are slippery. Use de-icer on steps and walkways. Add non-slip devices to shoes.    Put grab bars and nonskid mats in your shower or tub and near the toilet. Try to use a shower chair or bath bench when bathing.   Get into a tub or shower by putting in your weaker leg first. Get out with your strong side first. Have a phone or medical alert device in the bathroom with you.   Where can you learn more?  Go to https://www.Check-Capwise.net/patiented  Enter G117 in the search box to learn more about \"Preventing Falls: Care Instructions.\"  Current as of: " July 17, 2023  Content Version: 14.2 2024 [facility] [employer].   Care instructions adapted under license by your healthcare professional. If you have questions about a medical condition or this instruction, always ask your healthcare professional. [employer] disclaims any warranty or liability for your use of this information.    Hearing Loss: Care Instructions  Overview     Hearing loss is a sudden or slow decrease in how well you hear. It can range from slight to profound. Permanent hearing loss can occur with aging. It also can happen when you are exposed long-term to loud noise. Examples include listening to loud music, riding motorcycles, or being around other loud machines.  Hearing loss can affect your work and home life. It can make you feel lonely or depressed. You may feel that you have lost your independence. But hearing aids and other devices can help you hear better and feel connected to others.  Follow-up care is a key part of your treatment and safety. Be sure to make and go to all appointments, and call your doctor if you are having problems. It's also a good idea to know your test results and keep a list of the medicines you take.  How can you care for yourself at home?  Avoid loud noises whenever possible. This helps keep your hearing from getting worse.  Always wear hearing protection around loud noises.  Wear a hearing aid as directed.  A professional can help you pick a hearing aid that will work best for you.  You can also get hearing aids over the counter for mild to moderate hearing loss.  Have hearing tests as your doctor suggests. They can show whether your hearing has changed. Your hearing aid may need to be adjusted.  Use other devices as needed. These may include:  Telephone amplifiers and hearing aids that can connect to a television, stereo, radio, or microphone.  Devices that use lights or vibrations. These alert you to the doorbell, a ringing telephone, or a baby  "monitor.  Television closed-captioning. This shows the words at the bottom of the screen. Most new TVs can do this.  TTY (text telephone). This lets you type messages back and forth on the telephone instead of talking or listening. These devices are also called TDD. When messages are typed on the keyboard, they are sent over the phone line to a receiving TTY. The message is shown on a monitor.  Use text messaging, social media, and email if it is hard for you to communicate by telephone.  Try to learn a listening technique called speechreading. It is not lipreading. You pay attention to people's gestures, expressions, posture, and tone of voice. These clues can help you understand what a person is saying. Face the person you are talking to, and have them face you. Make sure the lighting is good. You need to see the other person's face clearly.  Think about counseling if you need help to adjust to your hearing loss.  When should you call for help?  Watch closely for changes in your health, and be sure to contact your doctor if:    You think your hearing is getting worse.     You have new symptoms, such as dizziness or nausea.   Where can you learn more?  Go to https://www.Wecash.net/patiented  Enter R798 in the search box to learn more about \"Hearing Loss: Care Instructions.\"  Current as of: September 27, 2023  Content Version: 14.2 2024 [facility] [employer].   Care instructions adapted under license by your healthcare professional. If you have questions about a medical condition or this instruction, always ask your healthcare professional. Healthwise, Incorporated disclaims any warranty or liability for your use of this information.       Patient Instructions  Everything looks stable.    Refills of needed medications have been faxed to your pharmacy  (gabapentin).    Lab results will be available soon on [facility].    If you resume furosemide once daily, we should recheck your \"basic metabolic panel\" within " 4-6 weeks to assure to change in sodium, potassium or kidney tests.     See me in a year, sooner if problems.

## 2024-10-29 NOTE — PROGRESS NOTES
Preventive Care Visit  [facility]  Srinivasa Tello MD, Internal Medicine  Oct 29, 2024      Assessment & Plan   (Z00.00) Encounter for Medicare annual wellness exam  (primary encounter diagnosis)  Comment: Stable health. See epic orders.     (B02.29) Post herpetic neuralgia  Comment: Symptoms have improved on gabapentin will refill.    Plan: OFFICE/OUTPT VISIT,EST,LEVL III, gabapentin         (NEURONTIN) 300 MG capsule, gabapentin         (NEURONTIN) 100 MG capsule          (I50.32) Chronic diastolic heart failure (H)  Comment: Refilled furosemide. Follow with cardiology as they advise.   Plan: OFFICE/OUTPT VISIT,EST,LEVL III          (E87.1) Hyponatremia  Comment: Recheck Sodium today and shortly after resuming furosemide.   Plan: Basic metabolic panel  (Ca, Cl, CO2, Creat,         Gluc, K, Na, BUN), OFFICE/OUTPT VISIT,EST,LEVL         III          (M06.09) Rheumatoid arthritis, seronegative, multiple sites (H)  Comment: Follow with Rheumatology as they advise.     (I71.40) Abdominal aortic aneurysm (AAA) without rupture, unspecified part (H)  Comment: Follow with Vascular Surgery as they advise.     (I25.10) Coronary artery calcification seen on CAT scan  (E78.5) Hyperlipidemia LDL goal <100  Comment: Update lipids. Patient has not been interested in statin therapy.   Plan: Lipid panel reflex to direct LDL Fasting,         Comprehensive metabolic panel          (R73.09) Elevated glucose  Comment: Check A1c.   Plan: Hemoglobin A1c            Patient has been advised of split billing requirements and indicates understanding: Yes        Counseling  Appropriate preventive services were addressed with this patient via screening, questionnaire, or discussion as appropriate for fall prevention, nutrition, physical activity, Tobacco-use cessation, social engagement, weight loss and cognition.  Checklist reviewing preventive services available has been given to the patient.  Reviewed patient's  "diet, addressing concerns and/or questions.   He is at risk for lack of exercise and has been provided with information to increase physical activity for the benefit of his well-being.   The patient was provided with written information regarding signs of hearing loss.       Patient Instructions  Everything looks stable.    Refills of needed medications have been faxed to your pharmacy  (gabapentin).    Lab results will be available soon on [facility].    If you resume furosemide once daily, we should recheck your \"basic metabolic panel\" within 4-6 weeks to assure to change in sodium, potassium or kidney tests.     See me in a year, sooner if problems.           Cassy Patel is a 89 year old, presenting for the following:  Physical        10/29/2024     8:57 AM   Additional Questions   Roomed by Echo Belcher         10/29/2024     8:57 AM   Patient Reported Additional Medications   Patient reports taking the following new medications no           HPI  In addition to an Annual Wellness Exam, we addressed post-herpetic neuralgia, rheumatoid arthritis, AAA, diastolic CHF, prostate cancer, dyspnea with exertion/fatigue, pulmonary fibrosis and dyspnea with exertion, h/o AAA, fatigue.     He continues to follow with several specialists, including Rheumatology, Urology/Oncology, Vascular Surgery, Ophthalmology.     He has been taking gabapentin which has helped control symptoms of post-herpetic neuralgia, and valcyclovir once daily prescribed by ophthalmology to avoid any further eye complications.      He has been diagnosed with pulmonary fibrosis by Dr Madison of pulmonary medicine. He hd been noting chronic shortness of breath with exertion which she has noted for over 15 years. He has also followed with Dr Littlejohn of cardiology.  He prescribed Lasix 10 mg at a time which he felt caused little benefit. An echocardiogram showed a good left ventricular ejection fraction.     He has stopped taking furosemide for a while, " "but is interested in resuming this due to some increased lower extremity edema.   He has had mild hyponatremia, and we discussed that this will need to be followed carefully should he continue taking furosemide.      The patient follows with rheumatology with Dr. Sahu for seronegative rheumatoid arthritis, currently treated with Plaquenil and prednisone.     The patient has longstanding prostate cancer diagnosed originally in 2008.  He has recently followed with Urology and with Dr. Whittington of oncology.  He completed radiation therapy in May 2017 and his most recent PSA was very low.  He takes Avodart and Flomax for this.    He has seen Dr Brown of Vascular surgery as recently as 9/13/2024 for discussion of AAA.      The patient has sleep apnea for which she is taking CPAP.  He has \"minor eczema\" followed by his dermatologist and treated with betamethasone            Health Care Directive  Patient does not have a Health Care Directive: Discussed advance care planning with patient; information given to patient to review.      10/28/2024   General Health   How would you rate your overall physical health? Good   Feel stress (tense, anxious, or unable to sleep) Not at all            10/28/2024   Nutrition   Diet: Regular (no restrictions)            10/28/2024   Exercise   Days per week of moderate/strenous exercise 2 days   Average minutes spent exercising at this level 30 min      (!) EXERCISE CONCERN      10/28/2024   Social Factors   Frequency of gathering with friends or relatives More than three times a week   Worry food won't last until get money to buy more No   Food not last or not have enough money for food? No   Do you have housing? (Housing is defined as stable permanent housing and does not include staying ouside in a car, in a tent, in an abandoned building, in an overnight shelter, or couch-surfing.) Yes   Are you worried about losing your housing? No   Lack of transportation? No   Unable to get " utilities (heat,electricity)? No            10/29/2024   Fall Risk   Gait Speed Test Interpretation Less than or equal to 5.00 seconds - PASS              10/28/2024   Activities of Daily Living- Home Safety   Needs help with the following daily activites None of the above   Safety concerns in the home None of the above            10/28/2024   Dental   Dentist two times every year? Yes            10/28/2024   Hearing Screening   Hearing concerns? (!) IT'S HARD TO FOLLOW A CONVERSATION IN A NOISY RESTAURANT OR CROWDED ROOM.    (!) TROUBLE UNDERSTANDING SOFT OR WHISPERED SPEECH.       Multiple values from one day are sorted in reverse-chronological order         10/28/2024   Driving Risk Screening   Patient/family members have concerns about driving No            10/28/2024   General Alertness/Fatigue Screening   Have you been more tired than usual lately? No            10/28/2024   Urinary Incontinence Screening   Bothered by leaking urine in past 6 months No            10/28/2024   TB Screening   Were you born outside of the [place]? No            Today's PHQ-2 Score:       10/28/2024     8:37 PM   PHQ-2 ( 1999 [employer])   Q1: Little interest or pleasure in doing things 0    Q2: Feeling down, depressed or hopeless 0    PHQ-2 Score 0    Q1: Little interest or pleasure in doing things Not at all   Q2: Feeling down, depressed or hopeless Not at all   PHQ-2 Score 0       Patient-reported           10/28/2024   Substance Use   Alcohol more than 3/day or more than 7/wk No   Do you have a current opioid prescription? No   How severe/bad is pain from 1 to 10? 1/10   Do you use any other substances recreationally? No        Social History     Tobacco Use    Smoking status: Never    Smokeless tobacco: Never   Vaping Use    Vaping status: Never Used   Substance Use Topics    Alcohol use: No    Drug use: No             Reviewed and updated as needed this visit by Provider                    Past medical, family and social histories as  "well as medications reviewed and updated as needed.    Current providers sharing in care for this patient include:  Patient Care Team:  Srinivasa Tello MD as PCP - General  Geremias Plunkett MD as Assigned Surgical Provider  Geremias Plunkett MD as MD (Urology)  Srinivasa Tello MD as Assigned PCP  Hair Littlejohn MD as MD (Cardiovascular Disease)  Fahad Brown MD as Assigned Heart and Vascular Provider    The following health maintenance items are reviewed in Epic and correct as of today:  Health Maintenance   Topic Date Due    HF ACTION PLAN  Never done    ASTHMA ACTION PLAN  Never done    ZOSTER IMMUNIZATION (1 of 2) Never done    RSV VACCINE (1 - 1-dose 75+ series) Never done    MEDICARE ANNUAL WELLNESS VISIT  05/19/2022    COLORECTAL CANCER SCREENING  06/06/2023    INFLUENZA VACCINE (1) 09/01/2024    COVID-19 Vaccine (8 - 2024-25 season) 09/01/2024    ASTHMA CONTROL TEST  11/14/2024    BMP  12/20/2024    DTAP/TDAP/TD IMMUNIZATION (6 - Td or Tdap) 02/17/2025    LIPID  05/14/2025    ALT  06/20/2025    CBC  06/21/2025    ANNUAL REVIEW OF HM ORDERS  07/12/2025    FALL RISK ASSESSMENT  10/29/2025    ADVANCE CARE PLANNING  06/06/2026    TSH W/FREE T4 REFLEX  Completed    PHQ-2 (once per calendar year)  Completed    Pneumococcal Vaccine: 65+ Years  Completed    HPV IMMUNIZATION  Aged Out    MENINGITIS IMMUNIZATION  Aged Out    RSV MONOCLONAL ANTIBODY  Aged Out       REVIEW OF SYSTEMS: The following systems have been completely reviewed and are negative except as noted above:   Constitutional, HEENT, respiratory, cardiovascular, gastrointestinal, genitourinary, musculoskeletal, dermatologic, hematologic, endocrine, psychiatric, and neurologic systems.       Objective    Exam  /64   Pulse 76   Temp 97  F (36.1  C) (Oral)   Resp 17   Ht 1.816 m (5' 11.5\")   Wt 88.9 kg (196 lb)   SpO2 97%   BMI 26.96 kg/m     Estimated body mass index is 26.96 kg/m  as calculated from the following:    Height as " "of this encounter: 1.816 m (5' 11.5\").    Weight as of this encounter: 88.9 kg (196 lb).    Physical Exam  GENERAL: alert and no distress  EYES: Eyes grossly normal to inspection, PERRL and conjunctivae and sclerae normal  HENT: ear canals and TM's normal, nose and mouth without ulcers or lesions  NECK: no adenopathy, no asymmetry, masses, or scars  RESP: lungs clear to auscultation - no rales, rhonchi or wheezes  CV: regular rate and rhythm, normal S1 S2, no S3 or S4, no murmur, click or rub, no peripheral edema  ABDOMEN: soft, nontender, no hepatosplenomegaly, no masses and bowel sounds normal  MS: no gross musculoskeletal defects noted, mild bilateral distal pretibial pitting edema  SKIN: no suspicious lesions or rashes  NEURO: Normal strength and tone, mentation intact and speech normal  PSYCH: mentation appears normal, affect normal/bright        10/29/2024   Mini Cog   Clock Draw Score 2 Normal   3 Item Recall 2 objects recalled   Mini Cog Total Score 4                 Signed Electronically by: Srinivasa Tello MD,     "

## 2024-10-30 LAB
ALBUMIN SERPL BCG-MCNC: 4.2 G/DL (ref 3.5–5.2)
ALP SERPL-CCNC: 48 U/L (ref 40–150)
ALT SERPL W P-5'-P-CCNC: 16 U/L (ref 0–70)
ANION GAP SERPL CALCULATED.3IONS-SCNC: 8 MMOL/L (ref 7–15)
AST SERPL W P-5'-P-CCNC: 21 U/L (ref 0–45)
BILIRUB SERPL-MCNC: 0.7 MG/DL
BUN SERPL-MCNC: 16.2 MG/DL (ref 8–23)
CALCIUM SERPL-MCNC: 9.3 MG/DL (ref 8.8–10.4)
CHLORIDE SERPL-SCNC: 104 MMOL/L (ref 98–107)
CHOLEST SERPL-MCNC: 150 MG/DL
CREAT SERPL-MCNC: 0.98 MG/DL (ref 0.67–1.17)
EGFRCR SERPLBLD CKD-EPI 2021: 73 ML/MIN/1.73M2
FASTING STATUS PATIENT QL REPORTED: YES
FASTING STATUS PATIENT QL REPORTED: YES
GLUCOSE SERPL-MCNC: 92 MG/DL (ref 70–99)
HCO3 SERPL-SCNC: 28 MMOL/L (ref 22–29)
HDLC SERPL-MCNC: 60 MG/DL
LDLC SERPL CALC-MCNC: 79 MG/DL
NONHDLC SERPL-MCNC: 90 MG/DL
POTASSIUM SERPL-SCNC: 4.1 MMOL/L (ref 3.4–5.3)
PROT SERPL-MCNC: 7.3 G/DL (ref 6.4–8.3)
SODIUM SERPL-SCNC: 140 MMOL/L (ref 135–145)
TRIGL SERPL-MCNC: 57 MG/DL

## 2024-11-03 PROBLEM — J45.909 ASTHMA: Status: RESOLVED | Noted: 2020-05-11 | Resolved: 2024-11-03

## 2025-01-03 ENCOUNTER — TRANSFERRED RECORDS (OUTPATIENT)
Dept: HEALTH INFORMATION MANAGEMENT | Facility: CLINIC | Age: OVER 89
End: 2025-01-03
Payer: COMMERCIAL

## 2025-01-28 ENCOUNTER — TRANSFERRED RECORDS (OUTPATIENT)
Dept: HEALTH INFORMATION MANAGEMENT | Facility: CLINIC | Age: OVER 89
End: 2025-01-28
Payer: COMMERCIAL

## 2025-03-06 ENCOUNTER — HOSPITAL ENCOUNTER (OUTPATIENT)
Dept: CT IMAGING | Facility: CLINIC | Age: OVER 89
End: 2025-03-06
Attending: SURGERY
Payer: COMMERCIAL

## 2025-03-06 DIAGNOSIS — I71.9 PENETRATING ATHEROSCLEROTIC ULCER OF AORTA: ICD-10-CM

## 2025-03-06 LAB
CREAT BLD-MCNC: 1 MG/DL (ref 0.7–1.3)
EGFRCR SERPLBLD CKD-EPI 2021: >60 ML/MIN/1.73M2

## 2025-03-06 PROCEDURE — 82565 ASSAY OF CREATININE: CPT

## 2025-03-06 PROCEDURE — 250N000011 HC RX IP 250 OP 636: Performed by: SURGERY

## 2025-03-06 PROCEDURE — 250N000009 HC RX 250: Performed by: SURGERY

## 2025-03-06 PROCEDURE — 74174 CTA ABD&PLVS W/CONTRAST: CPT

## 2025-03-06 RX ORDER — IOPAMIDOL 755 MG/ML
500 INJECTION, SOLUTION INTRAVASCULAR ONCE
Status: COMPLETED | OUTPATIENT
Start: 2025-03-06 | End: 2025-03-06

## 2025-03-06 RX ADMIN — IOPAMIDOL 72 ML: 755 INJECTION, SOLUTION INTRAVENOUS at 15:57

## 2025-03-06 RX ADMIN — SODIUM CHLORIDE 80 ML: 9 INJECTION, SOLUTION INTRAVENOUS at 15:57

## 2025-03-13 ENCOUNTER — OFFICE VISIT (OUTPATIENT)
Dept: SURGERY | Facility: CLINIC | Age: OVER 89
End: 2025-03-13
Attending: SURGERY
Payer: COMMERCIAL

## 2025-03-13 VITALS
SYSTOLIC BLOOD PRESSURE: 112 MMHG | RESPIRATION RATE: 16 BRPM | DIASTOLIC BLOOD PRESSURE: 56 MMHG | HEIGHT: 72 IN | HEART RATE: 78 BPM | WEIGHT: 196 LBS | BODY MASS INDEX: 26.55 KG/M2 | OXYGEN SATURATION: 99 %

## 2025-03-13 DIAGNOSIS — I67.1 SACCULAR ANEURYSM: ICD-10-CM

## 2025-03-13 DIAGNOSIS — I72.4 ANEURYSM OF LEFT POPLITEAL ARTERY: ICD-10-CM

## 2025-03-13 DIAGNOSIS — I71.9 PENETRATING ATHEROSCLEROTIC ULCER OF AORTA: Primary | ICD-10-CM

## 2025-03-13 NOTE — PROGRESS NOTES
VASCULAR SURGERY CLINIC NOTE    LOCATION:  [facility]     Jorge Salomon  Medical Record #: [id number]  YOB: 1933  Age: 89 year old     DATE OF SERVICE: 3/13/2025    PRIMARY CARE PROVIDER: Srinivasa Tello    REASON FOR VISIT:  Follow up from Dr. Brown for saccular aneurysm/CHEYENNE. Also with left popliteal artery aneurysm    IMPRESSION AND PLAN:    89 yo M with penetrating aortic ulcer/saccular aneurysm measuring approximately 36 mm in maximal diameter and 20.5 mm outpouching.  On CTA compared to November that there is more mural thrombus compared to previous and this area has partially thrombosed. I have reviewed these findings with the patient and his wife who has accompanied him today and discussed that this is stable from previous and that consideration of endovascular repair would be my recommendation. The patient has voiced that he had a long discussion with his primary care doctor and that he would not like to proceed with any type of intervention for his aorta particularly in the setting of his right groin pain which has not improved. He has decided he wants no further surgeries unless they are absolute emergencies and even then depending on the outcome he may refuse at that time. I did explain to him that we do not have good data regarding his risk of rupture for CHEYENNE/saccular aneurysms based on size but that if he does have rupture of his aneurysm there is an approximately 50% chance he will not survive to make it to a hospital. If he does survive to the hospital,  he needs to have both himself and his family aligned on his wishes on whether they would like to proceed with repair if when he arrives at the hospital he is a candidate. This is something that in a rupture situation could be done endovascularly for life saving measures. I also told him that there is much more risk and morbidity associated with repair in a rupture situation than doing this on an  elective basis and he has verbalized understanding. He again reiterated that at this time he would not like to proceed with surgery of any kind given his age which I believe is reasonable. He has asked that he see me prn if things change but that he does not wish to continue to undergo surveillance for his aorta or his popliteal artery aneurysms at this time as he does not wish to undergo surgery so I will follow up with him on an as needed basis.     HPI:  Jorge Salomon is a 89 year old male who presents to clinic for follow-up after seeing Dr. Brown for many years regarding an infrarenal saccular aneurysm that has been slow growing for many years. Most recently in 2024 when he was evaluated by Dr. Brown, there was discussion that this should undergo repair from an endovascular standpoint as he had favorable anatomy. 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 This is present all the time at baseline but flares up to become worse. He takes gabapentin to help alleviate the flare-ups. At the time of his visit he wanted to wait a few months to see if this would improve, but he tells me this has been stable over time and still pretty bothersome to him. He also has a history of pulmonary fibrosis in which he has had stable dyspnea for 10-15 years with activity. He takes prednisone for this. He tells me he has otherwise been doing well and still performs his ADLs, he is not walking as much due to his shortness of breath. He tells me recently at all times he has been having some leg edema and there was suggestion by his PCP he may have early heart failure as he complained of some pain in his calves and tightness but  "has no symptoms of claudication.     REVIEW OF SYSTEMS:    A 12 point ROS was reviewed and is negative except for what is listed above in HPI or that which is listed below.    PHH:    Past Medical History:   Diagnosis Date    AAA (abdominal aortic aneurysm) 05/11/2020    7/10/12 Focal ectasia of the distal abdominal aorta at the bifurcation is slightly larger measuring 2.7 x 2.4 cm, previously 2.2 x 2.1 cm when viewing prior exam. Focal outpouching posteriorly at this location previously (7/19/10).     Arthritis     Asthma 05/11/2020    Benign neoplasm of colon 6/98, 3/05    Hyperplastic polyp on both procedures.    Benign prostatic hyperplasia 01/16/2003     Problem list name updated by automated process. Provider to review and confirm    CHISHOLM (dyspnea on exertion) 05/11/2020    Dry eyes     Hernia, abdominal     Hyperlipidemia LDL goal <100 10/31/2010    Hypertrophy (benign) of prostate     hx of episodic prostatits    Internal hemorrhoids     Irritable bowel syndrome     IBS    Macular puckering of retina     Mitral valve disorder 05/11/2020    Mitral valve prolapse     Mumps     Orthopnea 05/11/2020    SHREYAS with use of CPAP 05/11/2020    SHREYAS with use of CPAP     Other disorders of vitreous     Vitreo-macular traction syndrome, left eye    Prostate cancer ([facility]) 06/19/2009    Pure hypercholesterolemia     RBBB 05/11/2020    Seronegative rheumatoid arthritis (H) 06/2018    Sjogren's syndrome 04/19/2018    Patient reports his ophthalmologist diagnosed him with this 30-40 years ago     Sleep apnea           Past Surgical History:   Procedure Laterality Date    COLONOSCOPY  6/98    one \"1/4 inch hyperplastic\" polyp.    COLONOSCOPY  3/2005    One hyperplastic polyp    CYSTOSCOPY      Full thickness macular hole, left eye  02/9/20008    HERNIORRHAPHY INGUINAL  7/9/2014    Procedure: HERNIORRHAPHY INGUINAL;  Surgeon: Reji Engle MD;  Location: [facility] OR    Left eye cataract  01/01/2000    Left vitreoretinal surgery  " 2/8/08    REMOVAL OF SPERM DUCT(S)  1970    Right eye Cataract  11/2012    VASECTOMY         ALLERGIES:  No known allergies    MEDS:    Current Outpatient Medications:     acetaminophen (TYLENOL) 500 MG tablet, Take 1-2 tablets (500-1,000 mg) by mouth every 6 hours as needed for mild pain, Disp: , Rfl:     Calcium Carbonate-Vit D-Min (CALCIUM 1200 PO), , Disp: , Rfl:     Cholecalciferol (VITAMIN D PO), Take  by mouth., Disp: , Rfl:     dutasteride (AVODART) 0.5 MG capsule, Take 1 capsule (0.5 mg) by mouth daily, Disp: 90 capsule, Rfl: 3    fish oil-omega-3 fatty acids 1000 MG capsule, Take 2 g by mouth daily., Disp: , Rfl:     FLAXSEED, LINSEED, PO, , Disp: , Rfl:     furosemide (LASIX) 20 MG tablet, Take 1 tablet (20 mg) by mouth daily., Disp: 90 tablet, Rfl: 2    gabapentin (NEURONTIN) 100 MG capsule, Take 1 capsule (100 mg) by mouth 3 times daily. Take with the 300 mg capsule, total of 400 mg three times daily., Disp: 270 capsule, Rfl: 3    gabapentin (NEURONTIN) 300 MG capsule, Take 1 capsule (300 mg) by mouth 3 times daily., Disp: 270 capsule, Rfl: 3    GLUCOSAMINE PO, Take by mouth 2 times daily , Disp: , Rfl:     hydroxychloroquine (PLAQUENIL) 200 MG tablet, Take 400 mg by mouth daily, Disp: 180 tablet, Rfl: 3    latanoprost (XALATAN) 0.005 % ophthalmic solution, INSTILL 1 DROP INTO RIGHT EYE IN THE EVENING, Disp: , Rfl:     predniSONE (DELTASONE) 2.5 MG tablet, Take 2.5 mg by mouth daily, Disp: , Rfl:     tamsulosin (FLOMAX) 0.4 MG capsule, Take 2 capsules (0.8 mg) by mouth every evening, Disp: 180 capsule, Rfl: 3    Current Facility-Administered Medications:     sodium chloride (PF) 0.9% PF flush 3 mL, 3 mL, Intravenous, q1 min prn, Mahin Aiken MD, 3 mL at 05/21/24 8567    SOCIAL HABITS:    History   Smoking Status    Never   Smokeless Tobacco    Never     Social History    Substance and Sexual Activity      Alcohol use: No      History   Drug Use No       FAMILY HISTORY:    Family  "History   Problem Relation Age of Onset    Heart Disease Mother          age 70. Had Heart Failure / inactive thyroid treatment cause cardiac failure    Thyroid Disease Mother         inactive thyroid, also \"2-3\" siblings    Prostate Cancer Father         Fatal Prostate CA,  age 80    Heart Disease Brother         Fatal MI, had diabetes. (Giovanni)  age 82,     Heart Disease Brother          in his 90's after a fall (Jarrod). Has had pacemaker.    Heart Disease Sister         Born 1924 (Kim), unspecified \"heart problems\"    Family History Negative Sister          in her 90's (Ayde).     Respiratory Brother          age 75 (Surya) COPD/hip fracture/pneumonia    Heart Disease Brother          age 82 of heart attack (Mukund)       PE:  /56   Pulse 78   Resp 16   Ht 5' 11.5\" (1.816 m)   Wt 196 lb (88.9 kg)   SpO2 99%   BMI 26.96 kg/m    Wt Readings from Last 1 Encounters:   25 196 lb (88.9 kg)     Body mass index is 26.96 kg/m .    EXAM:  GENERAL: well-developed 89 year old male who appears his stated age  CARDIAC: normal rate, capillary refill is brisk.  CHEST/LUNG: normal respiratory effort on room air  MUSCULOSKELETAL: grossly normal and both lower extremities are intact, no lower extremity edema  NEUROLOGIC: focally intact, alert and oriented x 3  PSYCH: appropriate mood affect  VASCULAR: Palpable femoral pulses, Palpable PT and DP pulses in bilateral lower extremities.     DIAGNOSTIC STUDIES:     Images:  I personally reviewed the images and my interpretation is CTA reviewed and there is a stable 3.6cm saccular anuerysm in the distal infrarenal abdominal aorta that looks unchanged from previous. This is only slightly larger compared to previous studies over the past 3 years on similar comparison measurement.   Original imaging  back in  demonstrates the presence of this aneurysm to be approximately 2.6cm. .    LABS:      Most recent Cr on file is 1.0.  A1c " 5.2    45 minutes spent on the day of encounter doing chart review, history and exam, documentation, and further activities as noted.   Leela Eubanks MD, VI  Vascular and Endovascular Surgery        *This document has been dictated using Dragon voice recognition software which may result in some transcription errors*   Helical Rim Advancement Flap Text: The defect edges were debeveled with a #15 blade scalpel.  Given the location of the defect and the proximity to free margins (helical rim) a double helical rim advancement flap was deemed most appropriate.  Using a sterile surgical marker, the appropriate advancement flaps were drawn incorporating the defect and placing the expected incisions between the helical rim and antihelix where possible.  The area thus outlined was incised through and through with a #15 scalpel blade.  With a skin hook and iris scissors, the flaps were gently and sharply undermined and freed up.

## 2025-03-17 ENCOUNTER — PATIENT OUTREACH (OUTPATIENT)
Dept: CARE COORDINATION | Facility: CLINIC | Age: OVER 89
End: 2025-03-17
Payer: COMMERCIAL

## 2025-04-15 ENCOUNTER — OFFICE VISIT (OUTPATIENT)
Dept: CARDIOLOGY | Facility: CLINIC | Age: OVER 89
End: 2025-04-15
Payer: COMMERCIAL

## 2025-04-15 VITALS
SYSTOLIC BLOOD PRESSURE: 124 MMHG | DIASTOLIC BLOOD PRESSURE: 66 MMHG | HEIGHT: 72 IN | BODY MASS INDEX: 26.9 KG/M2 | HEART RATE: 76 BPM | WEIGHT: 198.6 LBS

## 2025-04-15 DIAGNOSIS — I50.32 CHRONIC DIASTOLIC HEART FAILURE (H): Primary | ICD-10-CM

## 2025-04-15 DIAGNOSIS — R06.09 DOE (DYSPNEA ON EXERTION): ICD-10-CM

## 2025-04-15 DIAGNOSIS — I25.10 CORONARY ARTERY CALCIFICATION SEEN ON CAT SCAN: ICD-10-CM

## 2025-04-15 RX ORDER — PRAVASTATIN SODIUM 20 MG
20 TABLET ORAL DAILY
COMMUNITY
Start: 2025-04-15 | End: 2025-04-15

## 2025-04-15 RX ORDER — FUROSEMIDE 20 MG/1
20 TABLET ORAL DAILY
Qty: 90 TABLET | Refills: 3 | Status: SHIPPED | OUTPATIENT
Start: 2025-04-15

## 2025-04-15 RX ORDER — PRAVASTATIN SODIUM 20 MG
20 TABLET ORAL DAILY
Qty: 90 TABLET | Refills: 3 | Status: SHIPPED | OUTPATIENT
Start: 2025-04-15

## 2025-04-15 NOTE — LETTER
4/15/2025    Srinivasa Tello MD, MD  [address] [facility] [address]  [facility] [address]    RE: Jorge Salomon       Dear Colleague,     I had the pleasure of seeing Jorge Salomon in the [facility].  Cardiology Progress Note          Assessment and Plan:       Stable saccular AAA  Patient declines intervention.  Continue blood pressure control      Coronary calcification, negative stress testing 2024  Continue current medical management.  Tolerating pravastatin without difficulty.  Refilled prescription today.      Hypertension, good control  Continue current medical regimen.  Furosemide helping for his diastolic dysfunction.    40 minutes was spent with the patient, precharting and reviewing tests as well as post visit charting all done today..    This note was transcribed using electronic voice recognition software and there may be typographical errors present.     The longitudinal plan of care for the diagnosis(es)/condition(s) as documented were addressed during this visit. Due to the added complexity in care, I will continue to support Jorge in the subsequent management and with ongoing continuity of care.                    Interval History:     The patient is an inquisitive 89 year old former  whom I have seen previously for diastolic dysfunction, stable dyspnea, aortic root dilatation, hyperlipidemia and saccular AAA.  He feels like his exercise tolerance is stable.  He has interstitial lung disease.  He has not noticed any change in his functional capacity.  His lower extremity edema is under pretty good control with his current medical regimen.  He has bilateral calf pain when he exerts himself to a high degree.  Exercise DONELL 2021 did not have any significant abnormality.  We discussed stretching and continued walking.                     Review of Systems:     Review of Systems:  Skin:  not assessed     Eyes:  not assessed    ENT:  not assessed    Respiratory:  Positive for  "dyspnea on exertion, sleep apnea, CPAP  Cardiovascular:    Positive for, edema  Gastroenterology: not assessed    Genitourinary:  not assessed    Musculoskeletal:  not assessed    Neurologic:  not assessed    Psychiatric:  not assessed    Heme/Lymph/Imm:  not assessed    Endocrine:  not assessed                Physical Exam:     Vitals: /66 (BP Location: Left arm, Patient Position: Sitting, Cuff Size: Adult Regular)   Pulse 76   Ht 1.816 m (5' 11.5\")   Wt 90.1 kg (198 lb 9.6 oz)   BMI 27.31 kg/m    Constitutional:  cooperative, alert and oriented, well developed, well nourished, in no acute distress        Skin:  warm and dry to the touch, no apparent skin lesions or masses noted        Head:  normocephalic, no masses or lesions        Eyes:  pupils equal and round, conjunctivae and lids unremarkable, sclera white, no xanthalasma, EOMS intact, no nystagmus        Chest:  normal symmetry        Cardiac: regular rhythm occasional premature beats                Extremities and Back:  no deformities, clubbing, cyanosis, erythema observed        Neurological:  no gross motor deficits, affect appropriate                 Medications:     Current Outpatient Medications   Medication Sig Dispense Refill     acetaminophen (TYLENOL) 500 MG tablet Take 1-2 tablets (500-1,000 mg) by mouth every 6 hours as needed for mild pain       Calcium Carbonate-Vit D-Min (CALCIUM 1200 PO)        Cholecalciferol (VITAMIN D PO) Take  by mouth.       dutasteride (AVODART) 0.5 MG capsule Take 1 capsule (0.5 mg) by mouth daily 90 capsule 3     fish oil-omega-3 fatty acids 1000 MG capsule Take 2 g by mouth daily.       FLAXSEED, LINSEED, PO        furosemide (LASIX) 20 MG tablet Take 1 tablet (20 mg) by mouth daily. 90 tablet 2     gabapentin (NEURONTIN) 100 MG capsule Take 1 capsule (100 mg) by mouth 3 times daily. Take with the 300 mg capsule, total of 400 mg three times daily. 270 capsule 3     gabapentin (NEURONTIN) 300 MG capsule " Take 1 capsule (300 mg) by mouth 3 times daily. 270 capsule 3     GLUCOSAMINE PO Take by mouth 2 times daily        hydroxychloroquine (PLAQUENIL) 200 MG tablet Take 400 mg by mouth daily 180 tablet 3     pravastatin (PRAVACHOL) 20 MG tablet Take 1 tablet (20 mg) by mouth daily.       predniSONE (DELTASONE) 2.5 MG tablet Take 2.5 mg by mouth daily       tamsulosin (FLOMAX) 0.4 MG capsule Take 2 capsules (0.8 mg) by mouth every evening 180 capsule 3     latanoprost (XALATAN) 0.005 % ophthalmic solution INSTILL 1 DROP INTO RIGHT EYE IN THE EVENING (Patient not taking: Reported on 4/15/2025)                  Data:   All laboratory data reviewed  No results found for this or any previous visit (from the past 24 hours).    All laboratory data reviewed  Lab Results   Component Value Date    CHOL 150 10/29/2024    CHOL 141 05/19/2021     Lab Results   Component Value Date    HDL 60 10/29/2024    HDL 51 05/19/2021     Lab Results   Component Value Date    LDL 79 10/29/2024    LDL 79 05/19/2021     Lab Results   Component Value Date    TRIG 57 10/29/2024    TRIG 56 05/19/2021     Lab Results   Component Value Date    CHOLHDLRATIO 3.0 05/30/2013     TSH   Date Value Ref Range Status   06/28/2023 1.72 0.30 - 4.20 uIU/mL Final   03/30/2022 1.24 0.40 - 4.00 mU/L Final   05/19/2021 2.35 0.40 - 4.00 mU/L Final     Last Basic Metabolic Panel:  Lab Results   Component Value Date     12/06/2024     05/19/2021      Lab Results   Component Value Date    POTASSIUM 4.0 12/06/2024    POTASSIUM 4.3 03/30/2022    POTASSIUM 4.0 05/19/2021     Lab Results   Component Value Date    CHLORIDE 103 12/06/2024    CHLORIDE 106 03/30/2022    CHLORIDE 106 05/19/2021     Lab Results   Component Value Date    MARILYN 9.3 12/06/2024    MARILYN 8.8 05/19/2021     Lab Results   Component Value Date    CO2 29 12/06/2024    CO2 30 03/30/2022    CO2 33 05/19/2021     Lab Results   Component Value Date    BUN 16.4 12/06/2024    BUN 17 03/30/2022    BUN 16  05/19/2021     Lab Results   Component Value Date    CR 1.0 03/06/2025    CR 1.00 12/06/2024    CR 1.03 05/19/2021     Lab Results   Component Value Date     12/06/2024     03/30/2022    GLC 88 05/19/2021     Lab Results   Component Value Date    WBC 4.1 06/21/2024    WBC Canceled, Test credited 05/19/2021    WBC 3.1 05/19/2021     Lab Results   Component Value Date    RBC 3.33 06/21/2024    RBC Canceled, Test credited 05/19/2021    RBC 3.76 05/19/2021     Lab Results   Component Value Date    HGB 12.2 06/21/2024    HGB Canceled, Test credited 05/19/2021    HGB 13.5 05/19/2021     Lab Results   Component Value Date    HCT 35.2 06/21/2024    HCT Canceled, Test credited 05/19/2021    HCT 39.8 05/19/2021     Lab Results   Component Value Date     06/21/2024    MCV Canceled, Test credited 05/19/2021     05/19/2021     Lab Results   Component Value Date    MCH 36.6 06/21/2024    MCH Canceled, Test credited 05/19/2021    MCH 35.9 05/19/2021     Lab Results   Component Value Date    MCHC 34.7 06/21/2024    MCHC Canceled, Test credited 05/19/2021    MCHC 33.9 05/19/2021     Lab Results   Component Value Date    RDW 13.0 06/21/2024    RDW Canceled, Test credited 05/19/2021    RDW 12.8 05/19/2021     Lab Results   Component Value Date     06/21/2024    PLT Canceled, Test credited 05/19/2021     05/19/2021               Thank you for allowing me to participate in the care of your patient.      Sincerely,     Hair Littlejohn MD     [facility]  cc:   Srinivasa Tello MD  [address]  [place] [address]

## 2025-04-15 NOTE — PROGRESS NOTES
Cardiology Progress Note          Assessment and Plan:       Stable saccular AAA  Patient declines intervention.  Continue blood pressure control      Coronary calcification, negative stress testing 2024  Continue current medical management.  Tolerating pravastatin without difficulty.  Refilled prescription today.      Hypertension, good control  Continue current medical regimen.  Furosemide helping for his diastolic dysfunction.    40 minutes was spent with the patient, precharting and reviewing tests as well as post visit charting all done today..    This note was transcribed using electronic voice recognition software and there may be typographical errors present.     The longitudinal plan of care for the diagnosis(es)/condition(s) as documented were addressed during this visit. Due to the added complexity in care, I will continue to support Jorge in the subsequent management and with ongoing continuity of care.                    Interval History:     The patient is an inquisitive 89 year old former  whom I have seen previously for diastolic dysfunction, stable dyspnea, aortic root dilatation, hyperlipidemia and saccular AAA.  He feels like his exercise tolerance is stable.  He has interstitial lung disease.  He has not noticed any change in his functional capacity.  His lower extremity edema is under pretty good control with his current medical regimen.  He has bilateral calf pain when he exerts himself to a high degree.  Exercise DONELL 2021 did not have any significant abnormality.  We discussed stretching and continued walking.                     Review of Systems:     Review of Systems:  Skin:  not assessed     Eyes:  not assessed    ENT:  not assessed    Respiratory:  Positive for dyspnea on exertion, sleep apnea, CPAP  Cardiovascular:    Positive for, edema  Gastroenterology: not assessed    Genitourinary:  not assessed    Musculoskeletal:  not assessed    Neurologic:  not assessed    Psychiatric:  " not assessed    Heme/Lymph/Imm:  not assessed    Endocrine:  not assessed                Physical Exam:     Vitals: /66 (BP Location: Left arm, Patient Position: Sitting, Cuff Size: Adult Regular)   Pulse 76   Ht 1.816 m (5' 11.5\")   Wt 90.1 kg (198 lb 9.6 oz)   BMI 27.31 kg/m    Constitutional:  cooperative, alert and oriented, well developed, well nourished, in no acute distress        Skin:  warm and dry to the touch, no apparent skin lesions or masses noted        Head:  normocephalic, no masses or lesions        Eyes:  pupils equal and round, conjunctivae and lids unremarkable, sclera white, no xanthalasma, EOMS intact, no nystagmus        Chest:  normal symmetry        Cardiac: regular rhythm occasional premature beats                Extremities and Back:  no deformities, clubbing, cyanosis, erythema observed        Neurological:  no gross motor deficits, affect appropriate                 Medications:     Current Outpatient Medications   Medication Sig Dispense Refill    acetaminophen (TYLENOL) 500 MG tablet Take 1-2 tablets (500-1,000 mg) by mouth every 6 hours as needed for mild pain      Calcium Carbonate-Vit D-Min (CALCIUM 1200 PO)       Cholecalciferol (VITAMIN D PO) Take  by mouth.      dutasteride (AVODART) 0.5 MG capsule Take 1 capsule (0.5 mg) by mouth daily 90 capsule 3    fish oil-omega-3 fatty acids 1000 MG capsule Take 2 g by mouth daily.      FLAXSEED, LINSEED, PO       furosemide (LASIX) 20 MG tablet Take 1 tablet (20 mg) by mouth daily. 90 tablet 2    gabapentin (NEURONTIN) 100 MG capsule Take 1 capsule (100 mg) by mouth 3 times daily. Take with the 300 mg capsule, total of 400 mg three times daily. 270 capsule 3    gabapentin (NEURONTIN) 300 MG capsule Take 1 capsule (300 mg) by mouth 3 times daily. 270 capsule 3    GLUCOSAMINE PO Take by mouth 2 times daily       hydroxychloroquine (PLAQUENIL) 200 MG tablet Take 400 mg by mouth daily 180 tablet 3    pravastatin (PRAVACHOL) 20 MG " tablet Take 1 tablet (20 mg) by mouth daily.      predniSONE (DELTASONE) 2.5 MG tablet Take 2.5 mg by mouth daily      tamsulosin (FLOMAX) 0.4 MG capsule Take 2 capsules (0.8 mg) by mouth every evening 180 capsule 3    latanoprost (XALATAN) 0.005 % ophthalmic solution INSTILL 1 DROP INTO RIGHT EYE IN THE EVENING (Patient not taking: Reported on 4/15/2025)                  Data:   All laboratory data reviewed  No results found for this or any previous visit (from the past 24 hours).    All laboratory data reviewed  Lab Results   Component Value Date    CHOL 150 10/29/2024    CHOL 141 05/19/2021     Lab Results   Component Value Date    HDL 60 10/29/2024    HDL 51 05/19/2021     Lab Results   Component Value Date    LDL 79 10/29/2024    LDL 79 05/19/2021     Lab Results   Component Value Date    TRIG 57 10/29/2024    TRIG 56 05/19/2021     Lab Results   Component Value Date    CHOLHDLRATIO 3.0 05/30/2013     TSH   Date Value Ref Range Status   06/28/2023 1.72 0.30 - 4.20 uIU/mL Final   03/30/2022 1.24 0.40 - 4.00 mU/L Final   05/19/2021 2.35 0.40 - 4.00 mU/L Final     Last Basic Metabolic Panel:  Lab Results   Component Value Date     12/06/2024     05/19/2021      Lab Results   Component Value Date    POTASSIUM 4.0 12/06/2024    POTASSIUM 4.3 03/30/2022    POTASSIUM 4.0 05/19/2021     Lab Results   Component Value Date    CHLORIDE 103 12/06/2024    CHLORIDE 106 03/30/2022    CHLORIDE 106 05/19/2021     Lab Results   Component Value Date    MARILYN 9.3 12/06/2024    MARILYN 8.8 05/19/2021     Lab Results   Component Value Date    CO2 29 12/06/2024    CO2 30 03/30/2022    CO2 33 05/19/2021     Lab Results   Component Value Date    BUN 16.4 12/06/2024    BUN 17 03/30/2022    BUN 16 05/19/2021     Lab Results   Component Value Date    CR 1.0 03/06/2025    CR 1.00 12/06/2024    CR 1.03 05/19/2021     Lab Results   Component Value Date     12/06/2024     03/30/2022    GLC 88 05/19/2021     Lab Results    Component Value Date    WBC 4.1 06/21/2024    WBC Canceled, Test credited 05/19/2021    WBC 3.1 05/19/2021     Lab Results   Component Value Date    RBC 3.33 06/21/2024    RBC Canceled, Test credited 05/19/2021    RBC 3.76 05/19/2021     Lab Results   Component Value Date    HGB 12.2 06/21/2024    HGB Canceled, Test credited 05/19/2021    HGB 13.5 05/19/2021     Lab Results   Component Value Date    HCT 35.2 06/21/2024    HCT Canceled, Test credited 05/19/2021    HCT 39.8 05/19/2021     Lab Results   Component Value Date     06/21/2024    MCV Canceled, Test credited 05/19/2021     05/19/2021     Lab Results   Component Value Date    MCH 36.6 06/21/2024    MCH Canceled, Test credited 05/19/2021    MCH 35.9 05/19/2021     Lab Results   Component Value Date    MCHC 34.7 06/21/2024    MCHC Canceled, Test credited 05/19/2021    MCHC 33.9 05/19/2021     Lab Results   Component Value Date    RDW 13.0 06/21/2024    RDW Canceled, Test credited 05/19/2021    RDW 12.8 05/19/2021     Lab Results   Component Value Date     06/21/2024    PLT Canceled, Test credited 05/19/2021     05/19/2021

## 2025-05-01 DIAGNOSIS — C61 PROSTATE CANCER (H): Primary | ICD-10-CM

## 2025-05-19 ENCOUNTER — RESULTS FOLLOW-UP (OUTPATIENT)
Dept: INTERNAL MEDICINE | Facility: CLINIC | Age: OVER 89
End: 2025-05-19

## 2025-05-29 DIAGNOSIS — N40.1 BENIGN PROSTATIC HYPERPLASIA WITH WEAK URINARY STREAM: ICD-10-CM

## 2025-05-29 DIAGNOSIS — R39.12 BENIGN PROSTATIC HYPERPLASIA WITH WEAK URINARY STREAM: ICD-10-CM

## 2025-05-29 RX ORDER — TAMSULOSIN HYDROCHLORIDE 0.4 MG/1
0.8 CAPSULE ORAL EVERY EVENING
Qty: 180 CAPSULE | Refills: 0 | Status: SHIPPED | OUTPATIENT
Start: 2025-05-29

## 2025-06-11 DIAGNOSIS — C61 PROSTATE CANCER (H): ICD-10-CM

## 2025-06-11 RX ORDER — DUTASTERIDE 0.5 MG/1
1 CAPSULE, LIQUID FILLED ORAL DAILY
Qty: 90 CAPSULE | Refills: 0 | Status: SHIPPED | OUTPATIENT
Start: 2025-06-11

## 2025-07-10 ENCOUNTER — HOSPITAL ENCOUNTER (EMERGENCY)
Facility: CLINIC | Age: OVER 89
Discharge: HOME | End: 2025-07-10
Attending: EMERGENCY MEDICINE
Payer: COMMERCIAL

## 2025-07-10 ENCOUNTER — APPOINTMENT (OUTPATIENT)
Dept: CT IMAGING | Facility: CLINIC | Age: OVER 89
End: 2025-07-10
Attending: EMERGENCY MEDICINE
Payer: COMMERCIAL

## 2025-07-10 VITALS
DIASTOLIC BLOOD PRESSURE: 69 MMHG | SYSTOLIC BLOOD PRESSURE: 134 MMHG | RESPIRATION RATE: 16 BRPM | HEART RATE: 89 BPM | OXYGEN SATURATION: 95 % | BODY MASS INDEX: 27.2 KG/M2 | WEIGHT: 197.75 LBS | TEMPERATURE: 98 F

## 2025-07-10 DIAGNOSIS — R10.84 ABDOMINAL PAIN, GENERALIZED: ICD-10-CM

## 2025-07-10 DIAGNOSIS — J84.9 ILD (INTERSTITIAL LUNG DISEASE) (H): ICD-10-CM

## 2025-07-10 DIAGNOSIS — K64.4 EXTERNAL HEMORRHOIDS: ICD-10-CM

## 2025-07-10 LAB
ABO + RH BLD: NORMAL
ANION GAP SERPL CALCULATED.3IONS-SCNC: 12 MMOL/L (ref 7–15)
BASOPHILS # BLD AUTO: 0 10E3/UL (ref 0–0.2)
BASOPHILS NFR BLD AUTO: 0 %
BLD GP AB SCN SERPL QL: NEGATIVE
BUN SERPL-MCNC: 16.8 MG/DL (ref 8–23)
CALCIUM SERPL-MCNC: 9.1 MG/DL (ref 8.8–10.4)
CHLORIDE SERPL-SCNC: 97 MMOL/L (ref 98–107)
CREAT SERPL-MCNC: 0.98 MG/DL (ref 0.67–1.17)
EGFRCR SERPLBLD CKD-EPI 2021: 72 ML/MIN/1.73M2
EOSINOPHIL # BLD AUTO: 0 10E3/UL (ref 0–0.7)
EOSINOPHIL NFR BLD AUTO: 1 %
ERYTHROCYTE [DISTWIDTH] IN BLOOD BY AUTOMATED COUNT: 12.5 % (ref 10–15)
GLUCOSE SERPL-MCNC: 120 MG/DL (ref 70–99)
HCO3 SERPL-SCNC: 23 MMOL/L (ref 22–29)
HCT VFR BLD AUTO: 30.7 % (ref 40–53)
HEMOCCULT STL QL: NEGATIVE
HGB BLD-MCNC: 11 G/DL (ref 13.3–17.7)
HOLD SPECIMEN: NORMAL
HOLD SPECIMEN: NORMAL
IMM GRANULOCYTES # BLD: 0 10E3/UL
IMM GRANULOCYTES NFR BLD: 1 %
LYMPHOCYTES # BLD AUTO: 0.2 10E3/UL (ref 0.8–5.3)
LYMPHOCYTES NFR BLD AUTO: 8 %
MCH RBC QN AUTO: 37.8 PG (ref 26.5–33)
MCHC RBC AUTO-ENTMCNC: 35.8 G/DL (ref 31.5–36.5)
MCV RBC AUTO: 106 FL (ref 78–100)
MONOCYTES # BLD AUTO: 0.8 10E3/UL (ref 0–1.3)
MONOCYTES NFR BLD AUTO: 28 %
NEUTROPHILS # BLD AUTO: 1.8 10E3/UL (ref 1.6–8.3)
NEUTROPHILS NFR BLD AUTO: 63 %
NRBC # BLD AUTO: 0 10E3/UL
NRBC BLD AUTO-RTO: 0 /100
PLATELET # BLD AUTO: 147 10E3/UL (ref 150–450)
POTASSIUM SERPL-SCNC: 4.2 MMOL/L (ref 3.4–5.3)
RBC # BLD AUTO: 2.91 10E6/UL (ref 4.4–5.9)
SODIUM SERPL-SCNC: 132 MMOL/L (ref 135–145)
SPECIMEN EXP DATE BLD: NORMAL
WBC # BLD AUTO: 2.9 10E3/UL (ref 4–11)

## 2025-07-10 PROCEDURE — 74177 CT ABD & PELVIS W/CONTRAST: CPT

## 2025-07-10 PROCEDURE — 250N000011 HC RX IP 250 OP 636: Performed by: EMERGENCY MEDICINE

## 2025-07-10 PROCEDURE — 99285 EMERGENCY DEPT VISIT HI MDM: CPT | Mod: 25 | Performed by: EMERGENCY MEDICINE

## 2025-07-10 PROCEDURE — 82272 OCCULT BLD FECES 1-3 TESTS: CPT | Performed by: EMERGENCY MEDICINE

## 2025-07-10 PROCEDURE — 250N000009 HC RX 250: Performed by: EMERGENCY MEDICINE

## 2025-07-10 PROCEDURE — 36415 COLL VENOUS BLD VENIPUNCTURE: CPT | Performed by: EMERGENCY MEDICINE

## 2025-07-10 PROCEDURE — 80048 BASIC METABOLIC PNL TOTAL CA: CPT | Performed by: EMERGENCY MEDICINE

## 2025-07-10 PROCEDURE — 86900 BLOOD TYPING SEROLOGIC ABO: CPT | Performed by: EMERGENCY MEDICINE

## 2025-07-10 PROCEDURE — 85004 AUTOMATED DIFF WBC COUNT: CPT | Performed by: EMERGENCY MEDICINE

## 2025-07-10 RX ORDER — DOXYCYCLINE 100 MG/1
100 CAPSULE ORAL 2 TIMES DAILY
Qty: 20 CAPSULE | Refills: 0 | Status: SHIPPED | OUTPATIENT
Start: 2025-07-10 | End: 2025-07-20

## 2025-07-10 RX ORDER — IOPAMIDOL 755 MG/ML
99 INJECTION, SOLUTION INTRAVASCULAR ONCE
Status: COMPLETED | OUTPATIENT
Start: 2025-07-10 | End: 2025-07-10

## 2025-07-10 RX ORDER — HYDROCORTISONE 25 MG/G
CREAM TOPICAL 2 TIMES DAILY PRN
Qty: 28 G | Refills: 0 | Status: SHIPPED | OUTPATIENT
Start: 2025-07-10 | End: 2025-08-09

## 2025-07-10 RX ADMIN — SODIUM CHLORIDE 95 ML: 9 INJECTION, SOLUTION INTRAVENOUS at 13:51

## 2025-07-10 RX ADMIN — IOPAMIDOL 99 ML: 755 INJECTION, SOLUTION INTRAVENOUS at 13:51

## 2025-07-10 ASSESSMENT — COLUMBIA-SUICIDE SEVERITY RATING SCALE - C-SSRS
6. HAVE YOU EVER DONE ANYTHING, STARTED TO DO ANYTHING, OR PREPARED TO DO ANYTHING TO END YOUR LIFE?: NO
1. IN THE PAST MONTH, HAVE YOU WISHED YOU WERE DEAD OR WISHED YOU COULD GO TO SLEEP AND NOT WAKE UP?: NO
2. HAVE YOU ACTUALLY HAD ANY THOUGHTS OF KILLING YOURSELF IN THE PAST MONTH?: NO

## 2025-07-10 ASSESSMENT — ACTIVITIES OF DAILY LIVING (ADL)
ADLS_ACUITY_SCORE: 41

## 2025-07-10 NOTE — DISCHARGE INSTRUCTIONS
Return to the ER for worsening pain, recurrent bleeding, increased difficulty breathing, or any new concerns.    You should follow-up with your pulmonologist within the next week for further evaluation of your shortness of breath.    Follow-up with your gastroenterologist in the next week as well to further evaluate the rectal bleeding.

## 2025-07-10 NOTE — ED TRIAGE NOTES
Pt reports that he developed abdominal cramping this morning followed by blood in his stool. Pt notes that he has a hx of low platelets. Pt is feeling short of breath; worse with activity. Has a diagnosis of pulmonary fibrosis. Pt alert and ambulatory.

## 2025-07-10 NOTE — ED PROVIDER NOTES
Emergency Department Note      History of Present Illness     Chief Complaint   Abdominal Pain and Rectal Bleeding      HPI   Jorge Salomon is a 89 year old male who presents to the ED for evaluation of an episode of central abdominal pain this morning that is resolved.  He had a bowel movement which she said contained red blood.  He does have a history of hemorrhoids treated 20 years ago.  His most recent colonoscopy from 2013 demonstrated a polyp but no other abnormality.  He is never had diverticulitis before.  Is not anticoagulated but does have chronic thrombocytopenia related to his rheumatoid arthritis.  He uses prednisone and Plaquenil for this.  He denies any diarrhea, fever, vomiting.    The patient has a history of pulmonary fibrosis and is followed with pulmonology.  He is not oxygen dependent.  He has chronic dyspnea which he says is more pronounced over the course of the last 2 weeks or so.  No history of DVT or PE.  He does have a known abdominal aortic aneurysm that has had surveillance has not required any intervention.    No recent changes in the patient's medication or diet or activities.      Review of External Notes   June 6, 2013.    Past Medical History     Medical History and Problem List   Past Medical History:   Diagnosis Date    AAA (abdominal aortic aneurysm) 05/11/2020    Arthritis     Asthma 05/11/2020    Benign neoplasm of colon 6/98, 3/05    Benign prostatic hyperplasia 01/16/2003    CHISHOLM (dyspnea on exertion) 05/11/2020    Dry eyes     Hernia, abdominal     Hyperlipidemia LDL goal <100 10/31/2010    Hypertrophy (benign) of prostate     Internal hemorrhoids     Irritable bowel syndrome     Macular puckering of retina     Mitral valve disorder 05/11/2020    Mitral valve prolapse     Mumps     Orthopnea 05/11/2020    SHREYAS with use of CPAP 05/11/2020    SHREYAS with use of CPAP     Other disorders of vitreous     Prostate cancer (H) 06/19/2009    Pure hypercholesterolemia     RBBB  "05/11/2020    Seronegative rheumatoid arthritis (H) 06/2018    Sjogren's syndrome 04/19/2018    Sleep apnea        Medications   doxycycline hyclate (VIBRAMYCIN) 100 MG capsule  hydrocortisone, Perianal, (ANUSOL-HC) 2.5 % cream  acetaminophen (TYLENOL) 500 MG tablet  Calcium Carbonate-Vit D-Min (CALCIUM 1200 PO)  Cholecalciferol (VITAMIN D PO)  dutasteride (AVODART) 0.5 MG capsule  fish oil-omega-3 fatty acids 1000 MG capsule  FLAXSEED, LINSEED, PO  furosemide (LASIX) 20 MG tablet  gabapentin (NEURONTIN) 100 MG capsule  gabapentin (NEURONTIN) 300 MG capsule  GLUCOSAMINE PO  hydroxychloroquine (PLAQUENIL) 200 MG tablet  latanoprost (XALATAN) 0.005 % ophthalmic solution  pravastatin (PRAVACHOL) 20 MG tablet  predniSONE (DELTASONE) 2.5 MG tablet  tamsulosin (FLOMAX) 0.4 MG capsule        Surgical History   Past Surgical History:   Procedure Laterality Date    COLONOSCOPY  6/98    one \"1/4 inch hyperplastic\" polyp.    COLONOSCOPY  3/2005    One hyperplastic polyp    CYSTOSCOPY      Full thickness macular hole, left eye  02/9/20008    HERNIORRHAPHY INGUINAL  7/9/2014    Procedure: HERNIORRHAPHY INGUINAL;  Surgeon: Reji Engle MD;  Location: [facility] OR    Left eye cataract  01/01/2000    Left vitreoretinal surgery  2/8/08    REMOVAL OF SPERM DUCT(S)  1970    Right eye Cataract  11/2012    VASECTOMY         Physical Exam     Patient Vitals for the past 24 hrs:   BP Temp Temp src Pulse Resp SpO2 Weight   07/10/25 1452 -- -- -- -- -- 95 % --   07/10/25 1449 134/69 -- -- 89 -- 95 % --   07/10/25 1257 -- -- -- -- -- 95 % --   07/10/25 1221 -- -- -- -- -- -- 89.7 kg (197 lb 12 oz)   07/10/25 1220 -- -- -- -- 16 -- --   07/10/25 1218 137/74 98  F (36.7  C) Temporal 111 -- (!) 90 % --     Physical Exam  Constitutional:       General: He is not in acute distress.     Appearance: Normal appearance. He is not toxic-appearing.   HENT:      Head: Atraumatic.   Eyes:      General: No scleral icterus.     Conjunctiva/sclera: " Conjunctivae normal.   Cardiovascular:      Rate and Rhythm: Normal rate and regular rhythm.      Heart sounds: Normal heart sounds.   Pulmonary:      Effort: Pulmonary effort is normal. No respiratory distress.      Breath sounds: Normal breath sounds.   Abdominal:      General: There is no distension.      Palpations: Abdomen is soft.      Tenderness: There is no abdominal tenderness. There is no guarding or rebound.   Genitourinary:     Comments: Nonbleeding external hemorrhoid.  Stool brown.  No blood or melena.  Prostate mildly enlarged but without palpable mass.  No tenderness or bogginess.  Musculoskeletal:         General: No deformity.      Cervical back: Neck supple.   Skin:     General: Skin is warm.      Capillary Refill: Capillary refill takes less than 2 seconds.   Neurological:      General: No focal deficit present.      Mental Status: He is alert and oriented to person, place, and time.   Psychiatric:         Mood and Affect: Mood normal.         Behavior: Behavior normal.           Diagnostics     Lab Results   Labs Ordered and Resulted from Time of ED Arrival to Time of ED Departure   BASIC METABOLIC PANEL - Abnormal       Result Value    Sodium 132 (*)     Potassium 4.2      Chloride 97 (*)     Carbon Dioxide (CO2) 23      Anion Gap 12      Urea Nitrogen 16.8      Creatinine 0.98      GFR Estimate 72      Calcium 9.1      Glucose 120 (*)    CBC WITH PLATELETS AND DIFFERENTIAL - Abnormal    WBC Count 2.9 (*)     RBC Count 2.91 (*)     Hemoglobin 11.0 (*)     Hematocrit 30.7 (*)      (*)     MCH 37.8 (*)     MCHC 35.8      RDW 12.5      Platelet Count 147 (*)     % Neutrophils 63      % Lymphocytes 8      % Monocytes 28      % Eosinophils 1      % Basophils 0      % Immature Granulocytes 1      NRBCs per 100 WBC 0      Absolute Neutrophils 1.8      Absolute Lymphocytes 0.2 (*)     Absolute Monocytes 0.8      Absolute Eosinophils 0.0      Absolute Basophils 0.0      Absolute Immature  Granulocytes 0.0      Absolute NRBCs 0.0     OCCULT BLOOD STOOL - Normal    Occult Blood Negative     TYPE AND SCREEN, ADULT    ABO/RH(D) O POS      Antibody Screen Negative      SPECIMEN EXPIRATION DATE 7/13/2025 11:59:00 PM CDT     ABO/RH TYPE AND SCREEN       Imaging   CT Chest PE Abdomen Pelvis w Contrast   Final Result   IMPRESSION:   1.  New/increased groundglass opacities throughout both lungs with grossly stable traction bronchiectasis in the lung bases, could represent worsening of known interstitial lung disease versus superimposed infectious/inflammatory process.   2.  No pulmonary embolus.   3.  No acute abnormality in the abdomen or pelvis.   4.  Stable size of saccular infrarenal abdominal aortic aneurysm measuring up to 3.6 cm.          ED Course      Medications Administered   Medications   iopamidol (ISOVUE-370) solution 99 mL (99 mLs Intravenous $Given 7/10/25 1351)   sodium chloride 0.9 % bag for CT scan flush (95 mLs Intravenous $Given 7/10/25 1351)         Medical Decision Making / Diagnosis     MARIVEL Salomon is a 89 year old male who presents to the ED for evaluation of abdominal pain with an episode of bloody stool.  On exam he has an external hemorrhoid.  Stool is likely brown and guaiac negative and there is no sign of active bleeding.  Hemoglobin is 11 and there is no prior value within the last year for comparison.    Likely the hemorrhoid was the area of bleeding.  No other areas noted on the CT scan.  No evidence of diverticulitis.  No evidence of bowel obstruction.  He has a known aneurysm but there is no complication related to it noted on the CT scan.  The patient will be treated with Anusol cream.    The patient has longstanding interstitial lung disease.  He is not oxygen dependent.  O2 sat in the mid 90s here.  He says in the last few weeks he has become more short of breath.  CT scan without PE.  He does have increased opacities that are likely progression of the  interstitial lung disease.  He will be covered with doxycycline and was asked to follow with his physician in the next week.  He is appropriate for ongoing outpatient follow-up and is ambulatory without increased work of breathing here.    Disposition   The patient was discharged.     Diagnosis     ICD-10-CM    1. Abdominal pain, generalized  R10.84       2. External hemorrhoids  K64.4       3. ILD (interstitial lung disease) (H)  J84.9            Discharge Medications   New Prescriptions    DOXYCYCLINE HYCLATE (VIBRAMYCIN) 100 MG CAPSULE    Take 1 capsule (100 mg) by mouth 2 times daily for 10 days.    HYDROCORTISONE, PERIANAL, (ANUSOL-HC) 2.5 % CREAM    Place rectally 2 times daily as needed.               Rudy Alvarado MD  07/10/25 [id number]

## 2025-07-22 ENCOUNTER — RESULTS FOLLOW-UP (OUTPATIENT)
Dept: INTERNAL MEDICINE | Facility: CLINIC | Age: OVER 89
End: 2025-07-22

## 2025-07-22 ENCOUNTER — OFFICE VISIT (OUTPATIENT)
Dept: INTERNAL MEDICINE | Facility: CLINIC | Age: OVER 89
End: 2025-07-22
Payer: COMMERCIAL

## 2025-07-22 VITALS
TEMPERATURE: 97.6 F | RESPIRATION RATE: 18 BRPM | DIASTOLIC BLOOD PRESSURE: 62 MMHG | HEIGHT: 72 IN | WEIGHT: 189 LBS | HEART RATE: 103 BPM | OXYGEN SATURATION: 95 % | SYSTOLIC BLOOD PRESSURE: 128 MMHG | BODY MASS INDEX: 25.6 KG/M2

## 2025-07-22 DIAGNOSIS — B02.29 POST HERPETIC NEURALGIA: ICD-10-CM

## 2025-07-22 DIAGNOSIS — C61 PROSTATE CANCER (H): ICD-10-CM

## 2025-07-22 DIAGNOSIS — D64.9 ANEMIA, UNSPECIFIED TYPE: ICD-10-CM

## 2025-07-22 DIAGNOSIS — D70.9 NEUTROPENIA, UNSPECIFIED TYPE: ICD-10-CM

## 2025-07-22 DIAGNOSIS — R53.83 OTHER FATIGUE: ICD-10-CM

## 2025-07-22 DIAGNOSIS — M06.09 RHEUMATOID ARTHRITIS, SERONEGATIVE, MULTIPLE SITES (H): ICD-10-CM

## 2025-07-22 DIAGNOSIS — R06.09 DOE (DYSPNEA ON EXERTION): Primary | ICD-10-CM

## 2025-07-22 LAB
BASOPHILS # BLD AUTO: 0 10E3/UL (ref 0–0.2)
BASOPHILS NFR BLD AUTO: 1 %
EOSINOPHIL # BLD AUTO: 0.1 10E3/UL (ref 0–0.7)
EOSINOPHIL NFR BLD AUTO: 2 %
ERYTHROCYTE [DISTWIDTH] IN BLOOD BY AUTOMATED COUNT: 13 % (ref 10–15)
HCT VFR BLD AUTO: 33.8 % (ref 40–53)
HGB BLD-MCNC: 11.9 G/DL (ref 13.3–17.7)
IMM GRANULOCYTES # BLD: 0.2 10E3/UL
IMM GRANULOCYTES NFR BLD: 4 %
LYMPHOCYTES # BLD AUTO: 0.5 10E3/UL (ref 0.8–5.3)
LYMPHOCYTES NFR BLD AUTO: 11 %
MCH RBC QN AUTO: 37.3 PG (ref 26.5–33)
MCHC RBC AUTO-ENTMCNC: 35.2 G/DL (ref 31.5–36.5)
MCV RBC AUTO: 106 FL (ref 78–100)
MONOCYTES # BLD AUTO: 1.2 10E3/UL (ref 0–1.3)
MONOCYTES NFR BLD AUTO: 26 %
NEUTROPHILS # BLD AUTO: 2.7 10E3/UL (ref 1.6–8.3)
NEUTROPHILS NFR BLD AUTO: 57 %
NRBC # BLD AUTO: 0 10E3/UL
NRBC BLD AUTO-RTO: 0 /100
PLATELET # BLD AUTO: 107 10E3/UL (ref 150–450)
RBC # BLD AUTO: 3.19 10E6/UL (ref 4.4–5.9)
RETICS # AUTO: 0.07 10E6/UL (ref 0.03–0.1)
RETICS/RBC NFR AUTO: 2.1 % (ref 0.5–2)
WBC # BLD AUTO: 4.6 10E3/UL (ref 4–11)

## 2025-07-22 PROCEDURE — 85045 AUTOMATED RETICULOCYTE COUNT: CPT | Performed by: INTERNAL MEDICINE

## 2025-07-22 PROCEDURE — 99207 BLOOD MORPHOLOGY PATHOLOGIST REVIEW: CPT | Performed by: PATHOLOGY

## 2025-07-22 PROCEDURE — 83550 IRON BINDING TEST: CPT | Performed by: INTERNAL MEDICINE

## 2025-07-22 PROCEDURE — 3078F DIAST BP <80 MM HG: CPT | Performed by: INTERNAL MEDICINE

## 2025-07-22 PROCEDURE — 36415 COLL VENOUS BLD VENIPUNCTURE: CPT | Performed by: INTERNAL MEDICINE

## 2025-07-22 PROCEDURE — 83540 ASSAY OF IRON: CPT | Performed by: INTERNAL MEDICINE

## 2025-07-22 PROCEDURE — 82607 VITAMIN B-12: CPT | Performed by: INTERNAL MEDICINE

## 2025-07-22 PROCEDURE — 84153 ASSAY OF PSA TOTAL: CPT | Performed by: INTERNAL MEDICINE

## 2025-07-22 PROCEDURE — 3074F SYST BP LT 130 MM HG: CPT | Performed by: INTERNAL MEDICINE

## 2025-07-22 PROCEDURE — 84443 ASSAY THYROID STIM HORMONE: CPT | Performed by: INTERNAL MEDICINE

## 2025-07-22 PROCEDURE — 99215 OFFICE O/P EST HI 40 MIN: CPT | Performed by: INTERNAL MEDICINE

## 2025-07-22 PROCEDURE — 85025 COMPLETE CBC W/AUTO DIFF WBC: CPT | Performed by: INTERNAL MEDICINE

## 2025-07-22 RX ORDER — ALBUTEROL SULFATE 90 UG/1
2 INHALANT RESPIRATORY (INHALATION) EVERY 6 HOURS PRN
Qty: 18 G | Refills: 3 | Status: SHIPPED | OUTPATIENT
Start: 2025-07-22

## 2025-07-22 RX ORDER — GABAPENTIN 300 MG/1
300 CAPSULE ORAL 3 TIMES DAILY
Qty: 270 CAPSULE | Refills: 3 | Status: CANCELLED | OUTPATIENT
Start: 2025-07-22

## 2025-07-22 RX ORDER — GABAPENTIN 400 MG/1
400 CAPSULE ORAL 3 TIMES DAILY
Qty: 270 CAPSULE | Refills: 3 | Status: SHIPPED | OUTPATIENT
Start: 2025-07-22

## 2025-07-22 RX ORDER — GABAPENTIN 100 MG/1
100 CAPSULE ORAL 3 TIMES DAILY
Qty: 270 CAPSULE | Refills: 3 | Status: CANCELLED | OUTPATIENT
Start: 2025-07-22

## 2025-07-22 NOTE — PATIENT INSTRUCTIONS
We will check some labs to see whether there may be any simple remedy (doubtful) for the anemia and low WBC count.     Dr Madison and Dr Sahu may need to collaborate to try to help treat your Interstitial pulmonary fibrosis.   Dr Tello will try to speak with Dr Sahu before your upcoming appointment with him.     I've sent in a prescription for an albuterol inhaler to try, and for gabapentin 400 mg capsules to your pharmacy.     See me as planned in late October or November of 2025 for next Annual Wellness Visit, sooner if needed.

## 2025-07-22 NOTE — PROGRESS NOTES
Face to face time with patient and his wife: 46 minutes ([id number]---[id number])    Assessment & Plan   (R06.09) CHISHOLM (dyspnea on exertion)  (primary encounter diagnosis)  Comment: Chronic symptoms likely related primarily to ILD. Patient reports being advised by Dr Madison of pulmonary medicine that treatment of ILD would need to be deferred to Rheumatology, and the patient is aware that ILD is frequently seen in patients with RA.   Patient requests and is offered an Rx for an albuterol inhaler. Consider collaboration between pulmonologist and rheumatologist for management.  Plan: albuterol (PROAIR HFA/PROVENTIL HFA/VENTOLIN         HFA) 108 (90 Base) MCG/ACT inhaler    (M06.09) Rheumatoid arthritis, seronegative, multiple sites (H)  Comment: - Rheumatoid arthritis may contribute to anemia and interstitial lung disease.  - Continue current medications, including prednisone and Plaquenil. Consider collaboration with pulmonologist for lung involvement.  - Risks and side effects: Plaquenil may lower white blood cell count.  Plan: Continue current meds. Dr Tello to try to speak with Dr Sahu, his Rheumatologist.     (B02.29) Post herpetic neuralgia  Comment: Patient requests an Rx for gabapentin at the 400 mg dose, instead of combining a 300 mg and 100 mg capsule together.   Plan: gabapentin (NEURONTIN) 400 MG capsule          (D64.9) Anemia, unspecified type  Comment: Discussed that this anemia may be due primarily to chronic inflammatory disease. Will check for nutritional deficiencies as ordered.   Plan: Iron and iron binding capacity, Vitamin B12,         Lab Blood Morphology Pathologist Review,         CANCELED: CBC with platelets and differential          (D70.9) Neutropenia, unspecified type  Comment: Recheck hemogram and peripheral morphology.   - Recheck white blood cell count. Consider hematologist consultation if necessary.  Plan: Iron and iron binding capacity, Lab Blood         Morphology Pathologist Review,  "CANCELED: CBC         with platelets and differential          (R53.83) Other fatigue  Comment: Very likely multifactorial, with known inflammatory conditions involving RA and ILD, anemia, obstructive sleep apnea.   - Check thyroid function. Monitor hemoglobin and iron levels.  Plan: TSH with free T4 reflex          (C61) Prostate cancer (H)  Comment: Continue to follow with urology as they advise.       MED REC REQUIRED  Post Medication Reconciliation Status: discharge medications reconciled and changed, per note/orders  BMI  Estimated body mass index is 25.99 kg/m  as calculated from the following:    Height as of this encounter: 1.816 m (5' 11.5\").    Weight as of this encounter: 85.7 kg (189 lb).       Patient Instructions   We will check some labs to see whether there may be any simple remedy (doubtful) for the anemia and low WBC count.     Dr Madison and Dr Sahu may need to collaborate to try to help treat your Interstitial pulmonary fibrosis.   Dr Tello will try to speak with Dr Sahu before your upcoming appointment with him.     I've sent in a prescription for an albuterol inhaler to try, and for gabapentin 400 mg capsules to your pharmacy.     See me as planned in late October or November of 2025 for next Annual Wellness Visit, sooner if needed.          Cassy Norman is a 89 year old, presenting for the following health issues:  ER F/U (Shortness of breath)      7/22/2025     3:07 PM   Additional Questions   Roomed by Leah MG CMA   Accompanied by spouse, Hayde     History of Present Illness       Reason for visit:  Extremely short of breath, and very low blood numbers--I'm concerned  Symptoms include:  See above  Symptom progression:  Staying the same  Had these symptoms before:  Yes  Has tried/received treatment for these symptoms:  Yes  Previous treatment was successful:  No  What makes it worse:  I  need resolution of my condition  What makes it better:  --   He is taking medications " regularly.              ED/UC Followup:    Facility:  [facility] ED  Date of visit: 7/10/2025  Reason for visit: abdominal pain  Current Status: slightly improved    Patient seen at [facility] ED due to rectal bleeding and abdominal pain. CT scan of chest/abd/pelvis showed new/increased groundglass opacities throughout both lungs with grossly stable traction bronchiectasis in the bases. Which could represent worsening of known ILD vs superimposed infectious/inflammatory process.   No abdominal or pelvic pathology.     Patient is most bothered today by acute on chronic dyspnea and a dry cough, perhaps somewhat improved after time and a course of doxycycline.   He also expresses concern about lab test results, showing anemia and neutropenia.     - Shortness of breath for many years, worsened recently; unable to walk across a room without sitting down; needed to sit after walking in from parking lot  - Dry cough, triggered by talking, present now; did not have cough at pulmonology visit 2 months ago; cough and shortness of breath somewhat improved compared to 12 days ago  - Fatigue and gets very tired  - History of pulmonary fibrosis diagnosed 2 years ago by pulmonary medicine; was told it was not severe at that time  - Treated with antibiotics recently for possible infection on top of interstitial lung disease  - No fevers, no yellow-green sputum, no sinus pain, no earache, no sore throat  - No chest tightness or pressure, no dizziness  - Shortness of breath only with activity, not at rest  - History of exercise-induced asthma; used inhaler 15 years ago with benefit    - History of anemia for at least 2 years; aware of low blood counts from recent ER visit and prior labs; concerned about low blood numbers contributing to symptoms  - Iron levels checked 2 years ago, on lower end of normal at that time  - No history of thyroid issues  - History of sleep apnea, uses CPAP at night  - History of shingles  "in the eye; on daily valacyclovir (Valtrex) since then  - Under care of rheumatologist (Adelina) for rheumatoid arthritis; on prednisone 2.5 mg daily and Plaquenil 400 mg daily  - No home oxygen use  - Multiple echocardiograms over past 10 years, most recent 3 months ago, all reported as normal cardiac function      Past medical, family and social histories as well as medications reviewed and updated as needed.    REVIEW OF SYSTEMS: The following systems have been completely reviewed and are negative except as noted above:   Constitutional, HEENT, respiratory, cardiovascular, gastrointestinal, genitourinary, musculoskeletal, hematologic, endocrine, and neurologic systems.        Objective    /62 (BP Location: Right arm, Patient Position: Sitting, Cuff Size: Adult Large)   Pulse 103   Temp 97.6  F (36.4  C) (Tympanic)   Resp 18   Ht 1.816 m (5' 11.5\")   Wt 85.7 kg (189 lb)   SpO2 95%   BMI 25.99 kg/m    Body mass index is 25.99 kg/m .    Physical Exam   GENERAL: alert and no distress  NEURO: Normal strength and tone, mentation intact and speech normal  PSYCH: mentation appears normal, affect normal/bright            Signed Electronically by: Srinivasa Tello MD    "

## 2025-07-23 ENCOUNTER — TELEPHONE (OUTPATIENT)
Dept: INTERNAL MEDICINE | Facility: CLINIC | Age: OVER 89
End: 2025-07-23
Payer: COMMERCIAL

## 2025-07-23 LAB
IRON BINDING CAPACITY (ROCHE): 196 UG/DL (ref 240–430)
IRON SATN MFR SERPL: 27 % (ref 15–46)
IRON SERPL-MCNC: 52 UG/DL (ref 61–157)
PATH REPORT.COMMENTS IMP SPEC: NORMAL
PATH REPORT.COMMENTS IMP SPEC: NORMAL
PATH REPORT.FINAL DX SPEC: NORMAL
PATH REPORT.MICROSCOPIC SPEC OTHER STN: NORMAL
PATH REPORT.MICROSCOPIC SPEC OTHER STN: NORMAL
PATH REPORT.RELEVANT HX SPEC: NORMAL
PSA SERPL DL<=0.01 NG/ML-MCNC: <0.01 NG/ML
TSH SERPL DL<=0.005 MIU/L-ACNC: 1.51 UIU/ML (ref 0.3–4.2)
VIT B12 SERPL-MCNC: 855 PG/ML (ref 232–1245)

## 2025-07-23 NOTE — TELEPHONE ENCOUNTER
Application for disability parking placard was completed at patient office visit 7-    Form sent to scanning

## 2025-07-29 ENCOUNTER — TRANSFERRED RECORDS (OUTPATIENT)
Dept: HEALTH INFORMATION MANAGEMENT | Facility: CLINIC | Age: OVER 89
End: 2025-07-29
Payer: COMMERCIAL

## 2025-07-31 ENCOUNTER — OFFICE VISIT (OUTPATIENT)
Dept: UROLOGY | Facility: CLINIC | Age: OVER 89
End: 2025-07-31
Payer: COMMERCIAL

## 2025-07-31 VITALS
DIASTOLIC BLOOD PRESSURE: 74 MMHG | SYSTOLIC BLOOD PRESSURE: 125 MMHG | HEART RATE: 99 BPM | HEIGHT: 72 IN | BODY MASS INDEX: 25.6 KG/M2 | WEIGHT: 189 LBS | OXYGEN SATURATION: 97 %

## 2025-07-31 DIAGNOSIS — R33.9 INCOMPLETE BLADDER EMPTYING: Primary | ICD-10-CM

## 2025-07-31 DIAGNOSIS — R39.12 BENIGN PROSTATIC HYPERPLASIA WITH WEAK URINARY STREAM: ICD-10-CM

## 2025-07-31 DIAGNOSIS — N40.1 BENIGN PROSTATIC HYPERPLASIA WITH WEAK URINARY STREAM: ICD-10-CM

## 2025-07-31 DIAGNOSIS — C61 PROSTATE CANCER (H): ICD-10-CM

## 2025-07-31 LAB
ALBUMIN UR-MCNC: ABNORMAL MG/DL
APPEARANCE UR: CLEAR
BILIRUB UR QL STRIP: NEGATIVE
COLOR UR AUTO: YELLOW
GLUCOSE UR STRIP-MCNC: NEGATIVE MG/DL
HGB UR QL STRIP: ABNORMAL
KETONES UR STRIP-MCNC: NEGATIVE MG/DL
LEUKOCYTE ESTERASE UR QL STRIP: NEGATIVE
NITRATE UR QL: NEGATIVE
PH UR STRIP: 6 [PH] (ref 5–7)
RESIDUAL VOLUME (RV) (EXTERNAL): 110
SP GR UR STRIP: 1.02 (ref 1–1.03)
UROBILINOGEN UR STRIP-ACNC: 0.2 E.U./DL

## 2025-07-31 RX ORDER — DUTASTERIDE 0.5 MG/1
1 CAPSULE, LIQUID FILLED ORAL DAILY
Qty: 90 CAPSULE | Refills: 3 | Status: SHIPPED | OUTPATIENT
Start: 2025-07-31

## 2025-07-31 RX ORDER — TAMSULOSIN HYDROCHLORIDE 0.4 MG/1
0.8 CAPSULE ORAL EVERY EVENING
Qty: 180 CAPSULE | Refills: 3 | Status: SHIPPED | OUTPATIENT
Start: 2025-07-31

## 2025-07-31 ASSESSMENT — PAIN SCALES - GENERAL: PAINLEVEL_OUTOF10: NO PAIN (0)

## 2025-07-31 NOTE — LETTER
"7/31/2025       RE: Jorge Salomon  [address] [facility] [id number]     Dear Colleague,    Thank you for referring your patient, Jorge Salomon, to the [facility] [place] at [facility]. Please see a copy of my visit note below.    CHIEF COMPLAINT   Jorge Salomon who is a 89 year old male returns today for follow-up of [place] 3+3 = 6 prostate cancer s/p radiation 2017, BPH with incomplete bladder emptying on avodart and tamsulosin .      HPI   Jorge Salomon is a 89 year old male returns today for follow-up of [place] 3+3 = 6 prostate cancer s/p radiation 2017, BPH with incomplete bladder emptying on avodart and tamsulosin     AUA symptom score 7-2-1-3-1-1-3 = 14 qol mostly satisfied    Last seen a year ago. He continued on avodart and max flomax. He is no longer catheterizing. He had to do this for two months after getting shingles but has since stopped and now has a good stream    Unfortunately has been recently diagnosed with pulmonary fibrosis, currently on an inhaler but patient believes he will be on oxygen soon    PHYSICAL EXAM  Patient is a 89 year old  male   Vitals: Blood pressure 125/74, pulse 99, height 1.816 m (5' 11.5\"), weight 85.7 kg (189 lb), SpO2 97%.  Body mass index is 25.99 kg/m .  General Appearance Adult:   Alert, no acute distress, oriented  HENT: throat/mouth:normal, good dentition  Lungs: no respiratory distress, or pursed lip breathing  Heart: No obvious jugular venous distension present  Abdomen: nondistended  Musculoskeltal: extremities normal, no peripheral edema  Skin: no suspicious lesions or rashes  Neuro: Alert, oriented, speech and mentation normal  Psych: affect and mood normal  Gait: Normal    Component      Latest Ref Rng 7/31/2025  9:21 AM   Color Urine      Colorless, Straw, Light Yellow, Yellow  Yellow    Appearance Urine      Clear  Clear    Glucose Urine      Negative mg/dL " Negative    Bilirubin Urine      Negative  Negative    Ketones Urine      Negative mg/dL Negative    Specific Gravity Urine      1.003 - 1.035  1.020    Blood Urine      Negative  Trace !    pH Urine      5.0 - 7.0  6.0    Protein Albumin Urine      Negative mg/dL Trace !    Urobilinogen Urine      0.2, 1.0 E.U./dL 0.2    Nitrite Urine      Negative  Negative    Leukocyte Esterase Urine      Negative  Negative       Legend:  ! Abnormal    Component      Latest Ref Rng 7/22/2025  4:48 PM   PSA Tumor Marker      ng/mL <0.01      PVR 110ml    ASSESSMENT and PLAN  89 year old male returns today for follow-up of [place] 3+3 = 6 prostate cancer s/p radiation 2017, BPH with incomplete bladder emptying on avodart and tamsulosin     Re: BPH with incomplete bladder emptying, he is emptying well with low PVR for his age. Recommend he continue tamsulosin max dose and avodart indefinitely. I will refill for one year but further refills can be by PCP    Re: prostate cancer: PSA remains very low/undetectable after radiation, no intervention. No evidence of recurrent cancer. He can continue checking PSA annually and return to urology as needed if rising significantly      Geremias Plunkett MD   [facility]  [facility] Phone: [phone]      Again, thank you for allowing me to participate in the care of your patient.      Sincerely,    Geremias Plunkett MD

## 2025-07-31 NOTE — NURSING NOTE
Chief Complaint   Patient presents with    Incomplete bladder emptying      Pt states he is urinating more often than usual    PVR: 110 mL    Jackelyn Mejia, CMA

## 2025-07-31 NOTE — Clinical Note
Dr. Tello, I am seeing your patient for history of prostate cancer and his urinary symptoms. He is stable and doing well on dutasteride and tamsulosin. I recommend he continue both of these indefinitely and return to urology as needed if he has issues  Re: h/o prostate cancer, his PSA has remained near 0 since radiation therapy in 2017. Can check PSA annually and return to urology if PSA has significant rise  Geremias Plunkett MD  [facility]rology [phone] clinic phone [phone] (mobile call or text)

## 2025-07-31 NOTE — PROGRESS NOTES
"CHIEF COMPLAINT   Jorge Salomon who is a 89 year old male returns today for follow-up of [place] 3+3 = 6 prostate cancer s/p radiation 2017, BPH with incomplete bladder emptying on avodart and tamsulosin .      HPI   Jorge Salomon is a 89 year old male returns today for follow-up of [place] 3+3 = 6 prostate cancer s/p radiation 2017, BPH with incomplete bladder emptying on avodart and tamsulosin     AUA symptom score 2-3-2-3-1-1-3 = 14 qol mostly satisfied    Last seen a year ago. He continued on avodart and max flomax. He is no longer catheterizing. He had to do this for two months after getting shingles but has since stopped and now has a good stream    Unfortunately has been recently diagnosed with pulmonary fibrosis, currently on an inhaler but patient believes he will be on oxygen soon    PHYSICAL EXAM  Patient is a 89 year old  male   Vitals: Blood pressure 125/74, pulse 99, height 1.816 m (5' 11.5\"), weight 85.7 kg (189 lb), SpO2 97%.  Body mass index is 25.99 kg/m .  General Appearance Adult:   Alert, no acute distress, oriented  HENT: throat/mouth:normal, good dentition  Lungs: no respiratory distress, or pursed lip breathing  Heart: No obvious jugular venous distension present  Abdomen: nondistended  Musculoskeltal: extremities normal, no peripheral edema  Skin: no suspicious lesions or rashes  Neuro: Alert, oriented, speech and mentation normal  Psych: affect and mood normal  Gait: Normal    Component      Latest Ref Rn 7/31/2025  9:21 AM   Color Urine      Colorless, Straw, Light Yellow, Yellow  Yellow    Appearance Urine      Clear  Clear    Glucose Urine      Negative mg/dL Negative    Bilirubin Urine      Negative  Negative    Ketones Urine      Negative mg/dL Negative    Specific Gravity Urine      1.003 - 1.035  1.020    Blood Urine      Negative  Trace !    pH Urine      5.0 - 7.0  6.0    Protein Albumin Urine      Negative mg/dL Trace !    Urobilinogen Urine      0.2, 1.0 E.U./dL 0.2  "   Nitrite Urine      Negative  Negative    Leukocyte Esterase Urine      Negative  Negative       Legend:  ! Abnormal    Component      Latest Ref Rng 7/22/2025  4:48 PM   PSA Tumor Marker      ng/mL <0.01      PVR 110ml    ASSESSMENT and PLAN  89 year old male returns today for follow-up of [place] 3+3 = 6 prostate cancer s/p radiation 2017, BPH with incomplete bladder emptying on avodart and tamsulosin     Re: BPH with incomplete bladder emptying, he is emptying well with low PVR for his age. Recommend he continue tamsulosin max dose and avodart indefinitely. I will refill for one year but further refills can be by PCP    Re: prostate cancer: PSA remains very low/undetectable after radiation, no intervention. No evidence of recurrent cancer. He can continue checking PSA annually and return to urology as needed if rising significantly      Geremias Plunkett MD   [facility]rology  [facility] Phone: [phone]